# Patient Record
Sex: MALE | Race: BLACK OR AFRICAN AMERICAN | NOT HISPANIC OR LATINO | Employment: OTHER | URBAN - METROPOLITAN AREA
[De-identification: names, ages, dates, MRNs, and addresses within clinical notes are randomized per-mention and may not be internally consistent; named-entity substitution may affect disease eponyms.]

---

## 2017-05-03 DIAGNOSIS — Z12.5 SCREENING PSA (PROSTATE SPECIFIC ANTIGEN): Primary | ICD-10-CM

## 2017-05-03 DIAGNOSIS — E78.5 DYSLIPIDEMIA: ICD-10-CM

## 2017-05-04 RX ORDER — ATORVASTATIN CALCIUM 20 MG/1
TABLET, FILM COATED ORAL
Qty: 90 TABLET | Refills: 0 | Status: SHIPPED | OUTPATIENT
Start: 2017-05-04 | End: 2017-05-12 | Stop reason: SDUPTHER

## 2017-05-04 NOTE — TELEPHONE ENCOUNTER
Pt informed of refill sent to pharmacy and need for labs; verbalized understanding; scheduled for next week

## 2017-05-11 ENCOUNTER — LAB VISIT (OUTPATIENT)
Dept: LAB | Facility: HOSPITAL | Age: 62
End: 2017-05-11
Attending: FAMILY MEDICINE
Payer: COMMERCIAL

## 2017-05-11 DIAGNOSIS — Z12.5 SCREENING PSA (PROSTATE SPECIFIC ANTIGEN): ICD-10-CM

## 2017-05-11 DIAGNOSIS — E78.5 DYSLIPIDEMIA: ICD-10-CM

## 2017-05-11 LAB
ALBUMIN SERPL BCP-MCNC: 3.6 G/DL
ALP SERPL-CCNC: 64 U/L
ALT SERPL W/O P-5'-P-CCNC: 14 U/L
ANION GAP SERPL CALC-SCNC: 11 MMOL/L
AST SERPL-CCNC: 19 U/L
BILIRUB SERPL-MCNC: 0.6 MG/DL
BUN SERPL-MCNC: 18 MG/DL
CALCIUM SERPL-MCNC: 9.1 MG/DL
CHLORIDE SERPL-SCNC: 103 MMOL/L
CHOLEST/HDLC SERPL: 3.4 {RATIO}
CO2 SERPL-SCNC: 28 MMOL/L
COMPLEXED PSA SERPL-MCNC: 0.41 NG/ML
CREAT SERPL-MCNC: 1.2 MG/DL
EST. GFR  (AFRICAN AMERICAN): >60 ML/MIN/1.73 M^2
EST. GFR  (NON AFRICAN AMERICAN): >60 ML/MIN/1.73 M^2
GLUCOSE SERPL-MCNC: 95 MG/DL
HDL/CHOLESTEROL RATIO: 29.1 %
HDLC SERPL-MCNC: 148 MG/DL
HDLC SERPL-MCNC: 43 MG/DL
LDLC SERPL CALC-MCNC: 94.4 MG/DL
NONHDLC SERPL-MCNC: 105 MG/DL
POTASSIUM SERPL-SCNC: 3.8 MMOL/L
PROT SERPL-MCNC: 7.2 G/DL
SODIUM SERPL-SCNC: 142 MMOL/L
TRIGL SERPL-MCNC: 53 MG/DL

## 2017-05-11 PROCEDURE — 80061 LIPID PANEL: CPT

## 2017-05-11 PROCEDURE — 80053 COMPREHEN METABOLIC PANEL: CPT

## 2017-05-11 PROCEDURE — 36415 COLL VENOUS BLD VENIPUNCTURE: CPT | Mod: PO

## 2017-05-11 PROCEDURE — 84153 ASSAY OF PSA TOTAL: CPT

## 2017-05-12 ENCOUNTER — OFFICE VISIT (OUTPATIENT)
Dept: FAMILY MEDICINE | Facility: CLINIC | Age: 62
End: 2017-05-12
Payer: COMMERCIAL

## 2017-05-12 VITALS
OXYGEN SATURATION: 98 % | WEIGHT: 218.06 LBS | DIASTOLIC BLOOD PRESSURE: 80 MMHG | HEART RATE: 60 BPM | HEIGHT: 70 IN | TEMPERATURE: 98 F | SYSTOLIC BLOOD PRESSURE: 136 MMHG | RESPIRATION RATE: 17 BRPM | BODY MASS INDEX: 31.22 KG/M2

## 2017-05-12 DIAGNOSIS — E78.00 PURE HYPERCHOLESTEROLEMIA: ICD-10-CM

## 2017-05-12 DIAGNOSIS — I10 ESSENTIAL HYPERTENSION: ICD-10-CM

## 2017-05-12 DIAGNOSIS — Z00.00 WELL ADULT EXAM: Primary | ICD-10-CM

## 2017-05-12 PROCEDURE — 99396 PREV VISIT EST AGE 40-64: CPT | Mod: S$GLB,,, | Performed by: FAMILY MEDICINE

## 2017-05-12 PROCEDURE — 3075F SYST BP GE 130 - 139MM HG: CPT | Mod: S$GLB,,, | Performed by: FAMILY MEDICINE

## 2017-05-12 PROCEDURE — 99999 PR PBB SHADOW E&M-EST. PATIENT-LVL III: CPT | Mod: PBBFAC,,, | Performed by: FAMILY MEDICINE

## 2017-05-12 PROCEDURE — 3079F DIAST BP 80-89 MM HG: CPT | Mod: S$GLB,,, | Performed by: FAMILY MEDICINE

## 2017-05-12 RX ORDER — ATORVASTATIN CALCIUM 20 MG/1
20 TABLET, FILM COATED ORAL NIGHTLY
Qty: 90 TABLET | Refills: 0 | Status: SHIPPED | OUTPATIENT
Start: 2017-05-12 | End: 2017-08-28 | Stop reason: SDUPTHER

## 2017-05-12 NOTE — PROGRESS NOTES
Chief Complaint   Patient presents with    Annual Exam       Ascencion Owens is a 61 y.o. male who presents per the Chief Complaint.  Pt is known to me and was last seen by me on 5/10/2016.  All known chronic medical issues have been documented.       Hypertension   This is a chronic problem. The current episode started more than 1 year ago. The problem is unchanged. The problem is controlled. Associated symptoms include chest pain (chest wall muscle pull). Pertinent negatives include no anxiety, blurred vision, headaches, malaise/fatigue, neck pain, orthopnea, palpitations, peripheral edema, PND, shortness of breath or sweats. There are no associated agents to hypertension. Risk factors for coronary artery disease include male gender, obesity and dyslipidemia. Past treatments include ACE inhibitors and diuretics. The current treatment provides moderate improvement. There are no compliance problems.  There is no history of angina, kidney disease, CAD/MI, CVA, heart failure, left ventricular hypertrophy, PVD, renovascular disease, retinopathy or a thyroid problem. There is no history of chronic renal disease, coarctation of the aorta, hyperaldosteronism, hyperparathyroidism, a hypertension causing med or sleep apnea.   Hyperlipidemia   This is a chronic problem. The current episode started more than 1 year ago. The problem is controlled. Recent lipid tests were reviewed and are normal. Exacerbating diseases include obesity. He has no history of chronic renal disease, diabetes, hypothyroidism, liver disease or nephrotic syndrome. There are no known factors aggravating his hyperlipidemia. Associated symptoms include chest pain (chest wall muscle pull). Pertinent negatives include no focal sensory loss, focal weakness, leg pain, myalgias or shortness of breath. Current antihyperlipidemic treatment includes statins. The current treatment provides mild improvement of lipids. There are no compliance problems.  Risk  "factors for coronary artery disease include dyslipidemia, male sex, obesity, hypertension and family history.        ROS  Review of Systems   Constitutional: Negative for activity change, appetite change, chills, fatigue, fever and malaise/fatigue.   HENT: Negative for congestion, ear pain, postnasal drip, rhinorrhea, sinus pressure, sore throat and trouble swallowing.    Eyes: Negative for blurred vision, pain and visual disturbance.   Respiratory: Positive for cough (most commonly with singing). Negative for choking, chest tightness, shortness of breath, wheezing and stridor.    Cardiovascular: Positive for chest pain (chest wall muscle pull). Negative for palpitations, orthopnea and PND.   Gastrointestinal: Negative for abdominal pain, constipation, diarrhea, nausea and vomiting.   Genitourinary: Negative for difficulty urinating, dysuria, flank pain, frequency, penile pain, penile swelling, scrotal swelling, testicular pain and urgency.   Musculoskeletal: Negative.  Negative for arthralgias, back pain, joint swelling, myalgias, neck pain and neck stiffness.   Skin: Negative.    Allergic/Immunologic: Negative for environmental allergies, food allergies and immunocompromised state.   Neurological: Negative for dizziness, focal weakness, weakness, light-headedness and headaches.   Psychiatric/Behavioral: Negative.        Physical Exam  Vitals:    05/12/17 1027   BP: 136/80   Pulse: 60   Resp: 17   Temp: 98.1 °F (36.7 °C)    Body mass index is 31.28 kg/(m^2).  Weight: 98.9 kg (218 lb 0.6 oz)   Height: 5' 10" (177.8 cm)     Physical Exam   Constitutional: He is oriented to person, place, and time. He appears well-developed and well-nourished. He is active and cooperative.  Non-toxic appearance. He does not have a sickly appearance. He does not appear ill. No distress.   HENT:   Head: Normocephalic and atraumatic.   Right Ear: Hearing, tympanic membrane, external ear and ear canal normal. No tenderness. No foreign " bodies. No mastoid tenderness. Tympanic membrane is not injected, not scarred, not perforated, not erythematous, not retracted and not bulging. No hemotympanum. No decreased hearing is noted.   Left Ear: Hearing, tympanic membrane, external ear and ear canal normal. No tenderness. No foreign bodies. No mastoid tenderness. Tympanic membrane is not injected, not scarred, not perforated, not erythematous, not retracted and not bulging. No hemotympanum. No decreased hearing is noted.   Nose: Nose normal. No sinus tenderness, nasal deformity or septal deviation. No epistaxis.  No foreign bodies.   Mouth/Throat: Uvula is midline, oropharynx is clear and moist and mucous membranes are normal. He does not have dentures. Normal dentition. No uvula swelling or dental caries. No oropharyngeal exudate, posterior oropharyngeal edema, posterior oropharyngeal erythema or tonsillar abscesses.   Eyes: Conjunctivae, EOM and lids are normal. Pupils are equal, round, and reactive to light. Right eye exhibits no chemosis, no discharge, no exudate and no hordeolum. No foreign body present in the right eye. Left eye exhibits no chemosis, no discharge, no exudate and no hordeolum. No foreign body present in the left eye. No scleral icterus.   Neck: Normal range of motion and full passive range of motion without pain. Neck supple. No thyroid mass and no thyromegaly present.   Cardiovascular: Normal rate, regular rhythm, S1 normal, S2 normal and normal heart sounds.  Exam reveals no gallop and no friction rub.    No murmur heard.  Pulmonary/Chest: Effort normal and breath sounds normal. He has no decreased breath sounds. He has no wheezes. He has no rhonchi. He has no rales.   Abdominal: Soft. Normal appearance and bowel sounds are normal. He exhibits no distension, no ascites and no mass. There is no hepatosplenomegaly. There is no tenderness. There is no rigidity, no rebound and no guarding. No hernia.   Musculoskeletal: Normal range of  motion.   Lymphadenopathy:        Head (right side): No submental, no submandibular, no preauricular and no posterior auricular adenopathy present.        Head (left side): No submental, no submandibular, no preauricular and no posterior auricular adenopathy present.     He has no cervical adenopathy.   Neurological: He is alert and oriented to person, place, and time. He has normal strength. He displays no atrophy and no tremor. No cranial nerve deficit or sensory deficit. He exhibits normal muscle tone. He displays no seizure activity.   Skin: Skin is warm, dry and intact. No rash noted. He is not diaphoretic. No pallor.   Psychiatric: He has a normal mood and affect. His speech is normal and behavior is normal. Judgment and thought content normal. Cognition and memory are normal. He is attentive.   Vitals reviewed.      Assessment & Plan    1. Well adult exam  Discussed age appropriate screenings at this visit and encouraged a healthy diet with low saturated fats, and increased physical activity.  Screening test reviewed and discussed with patient.         2. Essential hypertension  Patient was counseled and encouraged to maintain a low sodium diet, as well as increasing physical activity.  Recommend random BP checks at home on a regular basis.  Repeat BP at end of visit was not necessary. Will continue medication at this time, and follow up in 6 months, or sooner if blood pressure is not well controlled.       3. Pure hypercholesterolemia  We discussed ways to manage cholesterol levels, including increasing whole grain foods and decreasing fried and fatty foods.  I also recommended OTC Omega 3 and Omega 6 supplements to improve overall cholesterol levels.  Will continue current therapy at this visit; patient is not due for screening blood test at this time.   - atorvastatin (LIPITOR) 20 MG tablet; Take 1 tablet (20 mg total) by mouth nightly.  Dispense: 90 tablet; Refill: 0    Follow up documented    ACTIVE  MEDICAL ISSUES:  Documented in Problem List    PAST MEDICAL HISTORY  Documented    PAST SURGICAL HISTORY:  Documented    SOCIAL HISTORY:  Documented    FAMILY HISTORY:  Documented    ALLERGIES AND MEDICATIONS: updated and reviewed.  Documented    Health Maintenance       Date Due Completion Date    Influenza Vaccine 8/1/2017 12/4/2015    Override on 12/4/2015: Done (today)    Override on 10/7/2014: (N/S)    Override on 1/1/2014: (N/S) (per pt)    Lipid Panel 5/11/2022 5/11/2017    Colonoscopy 6/10/2025 6/10/2015    TETANUS VACCINE 5/10/2026 5/10/2016 (Done)    Override on 5/10/2016: Done (future)

## 2017-06-30 ENCOUNTER — OFFICE VISIT (OUTPATIENT)
Dept: FAMILY MEDICINE | Facility: CLINIC | Age: 62
End: 2017-06-30
Payer: COMMERCIAL

## 2017-06-30 ENCOUNTER — TELEPHONE (OUTPATIENT)
Dept: FAMILY MEDICINE | Facility: CLINIC | Age: 62
End: 2017-06-30

## 2017-06-30 VITALS
WEIGHT: 218.94 LBS | SYSTOLIC BLOOD PRESSURE: 138 MMHG | HEIGHT: 70 IN | HEART RATE: 62 BPM | RESPIRATION RATE: 16 BRPM | OXYGEN SATURATION: 96 % | BODY MASS INDEX: 31.34 KG/M2 | TEMPERATURE: 98 F | DIASTOLIC BLOOD PRESSURE: 84 MMHG

## 2017-06-30 DIAGNOSIS — I10 ESSENTIAL HYPERTENSION: Primary | ICD-10-CM

## 2017-06-30 DIAGNOSIS — M10.071 ACUTE IDIOPATHIC GOUT INVOLVING TOE OF RIGHT FOOT: ICD-10-CM

## 2017-06-30 DIAGNOSIS — B35.3 TINEA PEDIS OF LEFT FOOT: ICD-10-CM

## 2017-06-30 PROCEDURE — 99999 PR PBB SHADOW E&M-EST. PATIENT-LVL III: CPT | Mod: PBBFAC,,, | Performed by: FAMILY MEDICINE

## 2017-06-30 PROCEDURE — 99214 OFFICE O/P EST MOD 30 MIN: CPT | Mod: S$GLB,,, | Performed by: FAMILY MEDICINE

## 2017-06-30 RX ORDER — FLUCONAZOLE 150 MG/1
150 TABLET ORAL WEEKLY
Qty: 4 TABLET | Refills: 0 | Status: SHIPPED | OUTPATIENT
Start: 2017-06-30 | End: 2017-07-22

## 2017-06-30 RX ORDER — LISINOPRIL AND HYDROCHLOROTHIAZIDE 12.5; 2 MG/1; MG/1
1 TABLET ORAL DAILY
Qty: 90 TABLET | Refills: 1 | Status: SHIPPED | OUTPATIENT
Start: 2017-06-30 | End: 2018-01-08 | Stop reason: SDUPTHER

## 2017-06-30 NOTE — TELEPHONE ENCOUNTER
----- Message from Zuleyma Richmond sent at 6/30/2017 11:59 AM CDT -----  Contact: SELF  Calling regarding prior auth for a medication that was called in today . He does not know the name of it . He would like something else called in .       [phone]     LL

## 2017-06-30 NOTE — TELEPHONE ENCOUNTER
Left message for patient to call office. Called to get information from patient regarding medication that he would like to have changed.  Patient had 2 medications sent to pharmacy today. Which medication is her trying to change?  Please advise.

## 2017-06-30 NOTE — PROGRESS NOTES
Chief Complaint   Patient presents with    Recurrent Skin Infections       Ascencion Owens is a 61 y.o. male who presents per the Chief Complaint.  Pt is known to me and was last seen by me on 5/12/2017.  All known chronic medical issues have been documented.       Rash   This is a new problem. The current episode started 1 to 4 weeks ago. The problem has been gradually improving since onset. The affected locations include the left toes. The rash is characterized by draining and peeling. He was exposed to nothing. Pertinent negatives include no anorexia, congestion, cough, diarrhea, eye pain, facial edema, fatigue, fever, joint pain, nail changes, rhinorrhea, shortness of breath, sore throat or vomiting. Past treatments include anti-itch cream (antifungal powder). The treatment provided mild relief. There is no history of allergies, asthma, eczema or varicella.   Hypertension   This is a chronic problem. The current episode started more than 1 year ago. The problem is unchanged. The problem is controlled. Pertinent negatives include no anxiety, blurred vision, chest pain, headaches, malaise/fatigue, neck pain, orthopnea, palpitations, peripheral edema, PND, shortness of breath or sweats. There are no associated agents to hypertension. Risk factors for coronary artery disease include male gender, obesity and dyslipidemia. Past treatments include ACE inhibitors and diuretics. The current treatment provides moderate improvement. There are no compliance problems.  There is no history of angina, kidney disease, CAD/MI, CVA, heart failure, left ventricular hypertrophy, PVD, renovascular disease, retinopathy or a thyroid problem. There is no history of chronic renal disease, coarctation of the aorta, hyperaldosteronism, hyperparathyroidism, a hypertension causing med or sleep apnea.        ROS  Review of Systems   Constitutional: Negative.  Negative for activity change, appetite change, chills, diaphoresis, fatigue,  "fever, malaise/fatigue and unexpected weight change.   HENT: Negative.  Negative for congestion, ear pain, hearing loss, nosebleeds, postnasal drip, rhinorrhea, sinus pressure, sneezing, sore throat and trouble swallowing.    Eyes: Negative for blurred vision, pain and visual disturbance.   Respiratory: Negative for cough, choking and shortness of breath.    Cardiovascular: Negative for chest pain, palpitations, orthopnea, leg swelling and PND.   Gastrointestinal: Negative for abdominal pain, anorexia, constipation, diarrhea, nausea and vomiting.   Genitourinary: Negative for difficulty urinating, dysuria, frequency and urgency.   Musculoskeletal: Positive for arthralgias (right big toe). Negative for back pain, gait problem, joint pain, joint swelling, myalgias, neck pain and neck stiffness.   Skin: Positive for rash (between 4th and 5th toes on left foot). Negative for nail changes.   Allergic/Immunologic: Negative for environmental allergies and food allergies.   Neurological: Negative.  Negative for dizziness, focal weakness, seizures, syncope, weakness, light-headedness and headaches.   Psychiatric/Behavioral: Negative.  Negative for confusion, decreased concentration, dysphoric mood and sleep disturbance. The patient is not nervous/anxious.        Physical Exam  Vitals:    06/30/17 0937   BP: 138/84   Pulse: 62   Resp: 16   Temp: 98.2 °F (36.8 °C)    Body mass index is 31.41 kg/m².  Weight: 99.3 kg (218 lb 14.7 oz)   Height: 5' 10" (177.8 cm)     Physical Exam   Constitutional: He is oriented to person, place, and time. He appears well-developed and well-nourished. He is active and cooperative.  Non-toxic appearance. He does not have a sickly appearance. He does not appear ill. No distress.   HENT:   Head: Normocephalic and atraumatic.   Right Ear: Hearing and external ear normal. No decreased hearing is noted.   Left Ear: Hearing and external ear normal. No decreased hearing is noted.   Nose: Nose normal. No " rhinorrhea or nasal deformity.   Mouth/Throat: Uvula is midline and oropharynx is clear and moist. He does not have dentures. Normal dentition.   Eyes: Conjunctivae, EOM and lids are normal. Pupils are equal, round, and reactive to light. Right eye exhibits no chemosis, no discharge and no exudate. No foreign body present in the right eye. Left eye exhibits no chemosis, no discharge and no exudate. No foreign body present in the left eye. No scleral icterus.   Neck: Normal range of motion and full passive range of motion without pain. Neck supple.   Cardiovascular: Normal rate, regular rhythm, S1 normal, S2 normal and normal heart sounds.  Exam reveals no gallop and no friction rub.    No murmur heard.  Pulmonary/Chest: Effort normal and breath sounds normal. No accessory muscle usage. No respiratory distress. He has no decreased breath sounds. He has no wheezes. He has no rhonchi. He has no rales.   Abdominal: Soft. Normal appearance. He exhibits no distension. There is no hepatosplenomegaly. There is no tenderness. There is no rigidity, no rebound and no guarding.   Musculoskeletal: Normal range of motion.        Right foot: There is tenderness.        Feet:    Neurological: He is alert and oriented to person, place, and time. He has normal strength. No cranial nerve deficit or sensory deficit. He exhibits normal muscle tone. He displays no seizure activity. Coordination and gait normal.   Skin: Skin is warm, dry and intact. Lesion and rash noted. No purpura noted. Rash is not macular, not papular, not maculopapular, not nodular, not pustular, not vesicular and not urticarial. He is not diaphoretic. No cyanosis. Nails show no clubbing.        Psychiatric: He has a normal mood and affect. His speech is normal and behavior is normal. Judgment and thought content normal. Cognition and memory are normal. He is attentive.       Assessment & Plan    1. Essential hypertension  Patient was counseled and encouraged to  maintain a low sodium diet, as well as increasing physical activity.  Recommend random BP checks at home on a regular basis.  Repeat BP at end of visit was not necessary. Will continue medication at this time, and follow up in 3 months, or sooner if blood pressure is not well controlled.     - lisinopril-hydrochlorothiazide (PRINZIDE,ZESTORETIC) 20-12.5 mg per tablet; Take 1 tablet by mouth once daily.  Dispense: 90 tablet; Refill: 1    2. Tinea pedis of left foot  Will start weekly oral anti-fungal until resolved; recommended using topical tolnaftate to improve healing.  - fluconazole (DIFLUCAN) 150 MG Tab; Take 1 tablet (150 mg total) by mouth once a week.  Dispense: 4 tablet; Refill: 0    3. Acute idiopathic gout involving toe of right foot  First episode; discussed diet as possible trigger.  Recommended increased hydration and diet modification; no medication at this time.      Follow up documented    ACTIVE MEDICAL ISSUES:  Documented in Problem List    PAST MEDICAL HISTORY  Documented    PAST SURGICAL HISTORY:  Documented    SOCIAL HISTORY:  Documented    FAMILY HISTORY:  Documented    ALLERGIES AND MEDICATIONS: updated and reviewed.  Documented    Health Maintenance       Date Due Completion Date    Influenza Vaccine 08/01/2017 12/4/2015    Override on 12/4/2015: Done (today)    Override on 10/7/2014: (N/S)    Override on 1/1/2014: (N/S) (per pt)    Lipid Panel 05/11/2022 5/11/2017    Colonoscopy 06/10/2025 6/10/2015    TETANUS VACCINE 05/10/2026 5/10/2016 (Done)    Override on 5/10/2016: Done (future)

## 2017-07-03 ENCOUNTER — TELEPHONE (OUTPATIENT)
Dept: FAMILY MEDICINE | Facility: CLINIC | Age: 62
End: 2017-07-03

## 2017-07-03 ENCOUNTER — NURSE TRIAGE (OUTPATIENT)
Dept: ADMINISTRATIVE | Facility: CLINIC | Age: 62
End: 2017-07-03

## 2017-07-03 NOTE — TELEPHONE ENCOUNTER
"    Reason for Disposition   Caller has NON-URGENT medication question about med that PCP prescribed and triager unable to answer question    Answer Assessment - Initial Assessment Questions  1. SYMPTOMS: "Do you have any symptoms?"      -  2. SEVERITY: If symptoms are present, ask "Are they mild, moderate or severe?"      -  Mr. Owens states his insurance company recommends Nystatin to be ordered to replace fluconazole.    Protocols used: ST MEDICATION QUESTION CALL-A-      "

## 2017-07-03 NOTE — TELEPHONE ENCOUNTER
Gwendolyn Calais, RN [facility] Staff 50 minutes ago (3:21 PM)      Good afternnon,     Please give Mr. Owens a call back at [phone]. He states he contact Estelle with his insurance company regarding fluconazole. Mr. Owens states his insurance company recommends Nystatin to be ordered to replace fluconazole. Estelle can be reached at [phone] if there any questions.     Thank you.

## 2017-07-03 NOTE — TELEPHONE ENCOUNTER
----- Message from Maggie Ortega sent at 7/3/2017 12:49 PM CDT -----  Contact: self  Pt is requesting script for athletes foot. Please advise. [phone]

## 2017-07-06 RX ORDER — NYSTATIN 100000 U/G
CREAM TOPICAL 2 TIMES DAILY
Qty: 30 G | Refills: 0 | Status: SHIPPED | OUTPATIENT
Start: 2017-07-06 | End: 2020-01-10

## 2017-08-01 DIAGNOSIS — E78.5 DYSLIPIDEMIA: ICD-10-CM

## 2017-08-02 RX ORDER — ATORVASTATIN CALCIUM 20 MG/1
TABLET, FILM COATED ORAL
Qty: 90 TABLET | Refills: 0 | OUTPATIENT
Start: 2017-08-02

## 2017-08-16 ENCOUNTER — TELEPHONE (OUTPATIENT)
Dept: FAMILY MEDICINE | Facility: CLINIC | Age: 62
End: 2017-08-16

## 2017-08-16 NOTE — TELEPHONE ENCOUNTER
----- Message from Augustina Johnson sent at 8/15/2017  4:51 PM CDT -----  Contact: Self  Pt calling regarding med below. Please call [phone].      atorvastatin (LIPITOR) 20 MG tablet

## 2017-08-22 DIAGNOSIS — E78.00 PURE HYPERCHOLESTEROLEMIA: ICD-10-CM

## 2017-08-22 NOTE — TELEPHONE ENCOUNTER
Patient informed that script refill status will be verified when Dr. Lombard returns to office on 8/23/2017.  Patient verbalized understanding, stated he has some medication left.

## 2017-08-22 NOTE — TELEPHONE ENCOUNTER
----- Message from Lexis Serrato sent at 8/17/2017  9:55 AM CDT -----  Patient is returning Liana's call.  States on August 2nd he received an e-mail stating his prescription for atorvastatin (LIPITOR) 20 MG tablet (prescription ending #[id number]) was unable to be processed because they were waiting on the doctor. Please call at [phone] Thank you!

## 2017-08-28 RX ORDER — ATORVASTATIN CALCIUM 20 MG/1
20 TABLET, FILM COATED ORAL NIGHTLY
Qty: 90 TABLET | Refills: 2 | Status: SHIPPED | OUTPATIENT
Start: 2017-08-28 | End: 2017-08-28 | Stop reason: SDUPTHER

## 2017-08-28 RX ORDER — ATORVASTATIN CALCIUM 20 MG/1
20 TABLET, FILM COATED ORAL NIGHTLY
Qty: 90 TABLET | Refills: 2 | Status: SHIPPED | OUTPATIENT
Start: 2017-08-28 | End: 2018-05-23 | Stop reason: SDUPTHER

## 2017-11-13 ENCOUNTER — TELEPHONE (OUTPATIENT)
Dept: FAMILY MEDICINE | Facility: CLINIC | Age: 62
End: 2017-11-13

## 2017-11-13 DIAGNOSIS — E78.5 DYSLIPIDEMIA: ICD-10-CM

## 2017-11-13 DIAGNOSIS — E78.00 PURE HYPERCHOLESTEROLEMIA: Primary | ICD-10-CM

## 2017-11-13 DIAGNOSIS — Z12.5 SCREENING PSA (PROSTATE SPECIFIC ANTIGEN): ICD-10-CM

## 2017-11-13 NOTE — TELEPHONE ENCOUNTER
----- Message from Abbe Bowen sent at 11/11/2017  9:51 AM CST -----  Contact: joanna  Pt called requesting orders for labs before 11.16.17 visit. Contact him at [phone]    Thanks-

## 2017-11-13 NOTE — TELEPHONE ENCOUNTER
Patient would like to come in before appointment for labs  LOV 5/12/2017  PSA,lipid and CMP done on 5/11/2017  Next appointment on 11/16/2017 at 2:20 pm

## 2017-11-15 ENCOUNTER — LAB VISIT (OUTPATIENT)
Dept: LAB | Facility: HOSPITAL | Age: 62
End: 2017-11-15
Attending: FAMILY MEDICINE
Payer: COMMERCIAL

## 2017-11-15 DIAGNOSIS — E78.00 PURE HYPERCHOLESTEROLEMIA: ICD-10-CM

## 2017-11-15 DIAGNOSIS — Z12.5 SCREENING PSA (PROSTATE SPECIFIC ANTIGEN): ICD-10-CM

## 2017-11-15 DIAGNOSIS — E78.5 DYSLIPIDEMIA: ICD-10-CM

## 2017-11-15 LAB
ALBUMIN SERPL BCP-MCNC: 3.4 G/DL
ALP SERPL-CCNC: 71 U/L
ALT SERPL W/O P-5'-P-CCNC: 17 U/L
ANION GAP SERPL CALC-SCNC: 7 MMOL/L
AST SERPL-CCNC: 20 U/L
BILIRUB SERPL-MCNC: 0.8 MG/DL
BUN SERPL-MCNC: 14 MG/DL
CALCIUM SERPL-MCNC: 9.5 MG/DL
CHLORIDE SERPL-SCNC: 105 MMOL/L
CHOLEST SERPL-MCNC: 151 MG/DL
CHOLEST/HDLC SERPL: 3.9 {RATIO}
CO2 SERPL-SCNC: 32 MMOL/L
COMPLEXED PSA SERPL-MCNC: 0.39 NG/ML
CREAT SERPL-MCNC: 1.2 MG/DL
EST. GFR  (AFRICAN AMERICAN): >60 ML/MIN/1.73 M^2
EST. GFR  (NON AFRICAN AMERICAN): >60 ML/MIN/1.73 M^2
GLUCOSE SERPL-MCNC: 90 MG/DL
HDLC SERPL-MCNC: 39 MG/DL
HDLC SERPL: 25.8 %
LDLC SERPL CALC-MCNC: 100.8 MG/DL
NONHDLC SERPL-MCNC: 112 MG/DL
POTASSIUM SERPL-SCNC: 4 MMOL/L
PROT SERPL-MCNC: 6.9 G/DL
SODIUM SERPL-SCNC: 144 MMOL/L
TRIGL SERPL-MCNC: 56 MG/DL

## 2017-11-15 PROCEDURE — 80053 COMPREHEN METABOLIC PANEL: CPT

## 2017-11-15 PROCEDURE — 80061 LIPID PANEL: CPT

## 2017-11-15 PROCEDURE — 84153 ASSAY OF PSA TOTAL: CPT

## 2017-11-15 PROCEDURE — 36415 COLL VENOUS BLD VENIPUNCTURE: CPT | Mod: PO

## 2017-11-16 ENCOUNTER — OFFICE VISIT (OUTPATIENT)
Dept: FAMILY MEDICINE | Facility: CLINIC | Age: 62
End: 2017-11-16
Payer: COMMERCIAL

## 2017-11-16 VITALS
OXYGEN SATURATION: 98 % | SYSTOLIC BLOOD PRESSURE: 136 MMHG | HEART RATE: 62 BPM | RESPIRATION RATE: 17 BRPM | BODY MASS INDEX: 31.47 KG/M2 | HEIGHT: 70 IN | TEMPERATURE: 99 F | DIASTOLIC BLOOD PRESSURE: 78 MMHG | WEIGHT: 219.81 LBS

## 2017-11-16 DIAGNOSIS — E78.00 PURE HYPERCHOLESTEROLEMIA: ICD-10-CM

## 2017-11-16 DIAGNOSIS — I10 ESSENTIAL HYPERTENSION: Primary | ICD-10-CM

## 2017-11-16 DIAGNOSIS — Z86.69 HISTORY OF CLUSTER HEADACHE: ICD-10-CM

## 2017-11-16 PROCEDURE — 99999 PR PBB SHADOW E&M-EST. PATIENT-LVL III: CPT | Mod: PBBFAC,,, | Performed by: FAMILY MEDICINE

## 2017-11-16 PROCEDURE — 99214 OFFICE O/P EST MOD 30 MIN: CPT | Mod: S$GLB,,, | Performed by: FAMILY MEDICINE

## 2017-11-16 RX ORDER — AMLODIPINE BESYLATE 10 MG/1
10 TABLET ORAL DAILY
Qty: 30 TABLET | Refills: 1 | Status: SHIPPED | OUTPATIENT
Start: 2017-11-16 | End: 2017-11-29 | Stop reason: SDUPTHER

## 2017-11-16 NOTE — PROGRESS NOTES
Chief Complaint   Patient presents with    Hypertension     6 months follow up , patient did had flu shot went to [facility]     Insomnia    Urinary Frequency     at nights time       Ascencion Owens is a 62 y.o. male who presents per the Chief Complaint.  Pt is known to me and was last seen by me on 6/30/2017.  All known chronic medical issues have been documented.       Hypertension   This is a chronic problem. The current episode started more than 1 year ago. The problem is unchanged. The problem is controlled. Associated symptoms include headaches (history of cluster headache). Pertinent negatives include no anxiety, blurred vision, chest pain, malaise/fatigue, neck pain, orthopnea, palpitations, peripheral edema, PND, shortness of breath or sweats. There are no associated agents to hypertension. Risk factors for coronary artery disease include male gender, obesity and dyslipidemia. Past treatments include ACE inhibitors and diuretics. The current treatment provides moderate improvement. There are no compliance problems.  There is no history of angina, kidney disease, CAD/MI, CVA, heart failure, left ventricular hypertrophy, PVD, renovascular disease, retinopathy or a thyroid problem. There is no history of chronic renal disease, coarctation of the aorta, hyperaldosteronism, hyperparathyroidism, a hypertension causing med or sleep apnea.   Urinary Frequency    This is a recurrent problem. The current episode started 1 to 4 weeks ago. The problem occurs intermittently. The problem has been waxing and waning. The pain is at a severity of 0/10. The patient is experiencing no pain. There has been no fever. Associated symptoms include frequency. Pertinent negatives include no behavior changes, chills, discharge, flank pain, hematuria, hesitancy, nausea, possible pregnancy, sweats, urgency, vomiting, weight loss, bubble bath use, constipation, rash or withholding. He has tried nothing for the symptoms. His past  medical history is significant for hypertension. There is no history of catheterization, diabetes insipidus, diabetes mellitus, genitourinary reflux, kidney stones, recurrent UTIs, a single kidney, STD, urinary stasis or a urological procedure.   Hyperlipidemia   This is a chronic problem. The current episode started more than 1 year ago. The problem is controlled. Recent lipid tests were reviewed and are low. He has no history of chronic renal disease, diabetes, hypothyroidism, liver disease, obesity or nephrotic syndrome. There are no known factors aggravating his hyperlipidemia. Pertinent negatives include no chest pain, myalgias or shortness of breath. Current antihyperlipidemic treatment includes statins. The current treatment provides significant improvement of lipids. There are no compliance problems.  Risk factors for coronary artery disease include dyslipidemia, hypertension and male sex.        ROS  Review of Systems   Constitutional: Negative.  Negative for activity change, appetite change, chills, diaphoresis, fatigue, fever, malaise/fatigue, unexpected weight change and weight loss.   HENT: Positive for congestion (head congestion). Negative for ear pain, hearing loss, nosebleeds, postnasal drip, rhinorrhea, sinus pain, sinus pressure, sneezing, sore throat, tinnitus, trouble swallowing and voice change.    Eyes: Negative for blurred vision, pain and visual disturbance.   Respiratory: Negative for cough, choking and shortness of breath.    Cardiovascular: Negative for chest pain, palpitations, orthopnea, leg swelling and PND.   Gastrointestinal: Negative for abdominal pain, anorexia, constipation, diarrhea, nausea and vomiting.   Genitourinary: Positive for frequency. Negative for decreased urine volume, difficulty urinating, discharge, dysuria, enuresis, flank pain, genital sores, hematuria, hesitancy, penile pain, penile swelling, scrotal swelling, testicular pain and urgency.   Musculoskeletal:  "Negative.  Negative for arthralgias, back pain, gait problem, joint pain, joint swelling, myalgias and neck pain.   Skin: Negative for nail changes and rash.   Allergic/Immunologic: Negative for environmental allergies and food allergies.   Neurological: Positive for headaches (history of cluster headache). Negative for dizziness, seizures, syncope, weakness and light-headedness.   Psychiatric/Behavioral: Positive for sleep disturbance. Negative for confusion, decreased concentration and dysphoric mood. The patient is not nervous/anxious.        Physical Exam  Vitals:    11/16/17 1448   BP: 136/78   Pulse:    Resp:    Temp:     Body mass index is 31.54 kg/m².  Weight: 99.7 kg (219 lb 12.8 oz)   Height: 5' 10" (177.8 cm)     Physical Exam   Constitutional: He is oriented to person, place, and time. He appears well-developed and well-nourished. He is active and cooperative.  Non-toxic appearance. He does not have a sickly appearance. He does not appear ill. No distress.   HENT:   Head: Normocephalic and atraumatic.   Right Ear: Hearing and external ear normal. No decreased hearing is noted.   Left Ear: Hearing and external ear normal. No decreased hearing is noted.   Nose: Nose normal. No rhinorrhea or nasal deformity.   Mouth/Throat: Uvula is midline and oropharynx is clear and moist. He does not have dentures. Normal dentition.   Eyes: Conjunctivae, EOM and lids are normal. Pupils are equal, round, and reactive to light. Right eye exhibits no chemosis, no discharge and no exudate. No foreign body present in the right eye. Left eye exhibits no chemosis, no discharge and no exudate. No foreign body present in the left eye. No scleral icterus.   Neck: Normal range of motion and full passive range of motion without pain. Neck supple.   Cardiovascular: Normal rate, regular rhythm, S1 normal, S2 normal and normal heart sounds.  Exam reveals no gallop and no friction rub.    No murmur heard.  Pulmonary/Chest: Effort normal " and breath sounds normal. No accessory muscle usage. No respiratory distress. He has no decreased breath sounds. He has no wheezes. He has no rhonchi. He has no rales.   Abdominal: Soft. Normal appearance. He exhibits no distension. There is no hepatosplenomegaly. There is no tenderness. There is no rigidity, no rebound and no guarding.   Musculoskeletal: Normal range of motion.   Neurological: He is alert and oriented to person, place, and time. He has normal strength. No cranial nerve deficit or sensory deficit. He exhibits normal muscle tone. He displays no seizure activity. Coordination and gait normal.   Skin: Skin is warm, dry and intact. No rash noted. He is not diaphoretic.   Psychiatric: He has a normal mood and affect. His speech is normal and behavior is normal. Judgment and thought content normal. Cognition and memory are normal. He is attentive.       Assessment & Plan    1. Essential hypertension  Patient was counseled and encouraged to maintain a low sodium diet, as well as increasing physical activity.  Recommend random BP checks at home on a regular basis.  Repeat BP at end of visit was not necessary. Will continue medication at this time, and follow up in 3-6 months, or sooner if blood pressure begins to increase.  Will stop ACEI due to onset of cough and stop diuretic due to increase urination.  Will start CCB.  - amLODIPine (NORVASC) 10 MG tablet; Take 1 tablet (10 mg total) by mouth once daily.  Dispense: 30 tablet; Refill: 1    2. Pure hypercholesterolemia  We discussed ways to manage cholesterol levels, including increasing whole grain foods and decreasing fried and fatty foods.  I also recommended OTC Omega 3 and Omega 6 supplements to improve overall cholesterol levels.  Will continue current therapy at this visit; patient is not due for screening blood test at this time.     3. History of cluster headache  Stable; no acute episodes.  Recent symptoms likely sinus and allergy  related.      Follow up documented    ACTIVE MEDICAL ISSUES:  Documented in Problem List    PAST MEDICAL HISTORY  Documented    PAST SURGICAL HISTORY:  Documented    SOCIAL HISTORY:  Documented    FAMILY HISTORY:  Documented    ALLERGIES AND MEDICATIONS: updated and reviewed.  Documented    Health Maintenance       Date Due Completion Date    Influenza Vaccine 08/01/2017 10/4/2016    Override on 12/4/2015: Done (today)    Override on 10/7/2014: (N/S)    Override on 1/1/2014: (N/S) (per pt)    Lipid Panel 11/15/2022 11/15/2017    Colonoscopy 06/10/2025 6/10/2015    TETANUS VACCINE 05/10/2026 5/10/2016 (Done)    Override on 5/10/2016: Done (future)

## 2017-11-25 ENCOUNTER — OFFICE VISIT (OUTPATIENT)
Dept: FAMILY MEDICINE | Facility: CLINIC | Age: 62
End: 2017-11-25
Payer: COMMERCIAL

## 2017-11-25 VITALS
HEIGHT: 69 IN | OXYGEN SATURATION: 98 % | BODY MASS INDEX: 32.98 KG/M2 | DIASTOLIC BLOOD PRESSURE: 66 MMHG | TEMPERATURE: 98 F | HEART RATE: 72 BPM | WEIGHT: 222.69 LBS | SYSTOLIC BLOOD PRESSURE: 140 MMHG

## 2017-11-25 DIAGNOSIS — M10.9 ACUTE GOUT INVOLVING TOE OF LEFT FOOT, UNSPECIFIED CAUSE: Primary | ICD-10-CM

## 2017-11-25 PROCEDURE — 99999 PR PBB SHADOW E&M-EST. PATIENT-LVL III: CPT | Mod: PBBFAC,,, | Performed by: NURSE PRACTITIONER

## 2017-11-25 PROCEDURE — 96372 THER/PROPH/DIAG INJ SC/IM: CPT | Mod: S$GLB,,, | Performed by: INTERNAL MEDICINE

## 2017-11-25 PROCEDURE — 99213 OFFICE O/P EST LOW 20 MIN: CPT | Mod: 25,S$GLB,, | Performed by: NURSE PRACTITIONER

## 2017-11-25 RX ORDER — IBUPROFEN 800 MG/1
800 TABLET ORAL 3 TIMES DAILY
Qty: 30 TABLET | Refills: 0 | Status: SHIPPED | OUTPATIENT
Start: 2017-11-25 | End: 2017-11-25 | Stop reason: SDUPTHER

## 2017-11-25 RX ORDER — IBUPROFEN 800 MG/1
800 TABLET ORAL 3 TIMES DAILY
Qty: 30 TABLET | Refills: 0 | Status: SHIPPED | OUTPATIENT
Start: 2017-11-25 | End: 2017-11-29 | Stop reason: ALTCHOICE

## 2017-11-25 RX ORDER — PREDNISONE 20 MG/1
20 TABLET ORAL DAILY
Qty: 3 TABLET | Refills: 0 | Status: SHIPPED | OUTPATIENT
Start: 2017-11-25 | End: 2017-11-25 | Stop reason: SDUPTHER

## 2017-11-25 RX ORDER — PREDNISONE 20 MG/1
20 TABLET ORAL DAILY
Qty: 3 TABLET | Refills: 0 | Status: SHIPPED | OUTPATIENT
Start: 2017-11-25 | End: 2017-11-28

## 2017-11-25 NOTE — PROGRESS NOTES
Subjective:       Patient ID: Ascencion Owens is a 62 y.o. male who presents to urgent care with left great toe pain that began several days ago, the pain has gotten worse over the last few days, very sensitive to touch, has been told in the past he has gout, has been taking otc meds with some help    Chief Complaint: Gout    HPI  Review of Systems   Constitutional: Negative for activity change, appetite change, chills, fatigue and fever.   Respiratory: Negative for cough, chest tightness, shortness of breath and wheezing.    Cardiovascular: Negative for chest pain and leg swelling.   Gastrointestinal: Negative for abdominal distention, abdominal pain, anal bleeding, blood in stool, constipation, diarrhea, nausea, rectal pain and vomiting.   Genitourinary: Negative for difficulty urinating, enuresis, flank pain, frequency, genital sores and hematuria.   Musculoskeletal: Positive for gait problem and joint swelling. Negative for back pain.   Skin: Negative.  Negative for color change.   Neurological: Negative for dizziness, syncope and numbness.   Psychiatric/Behavioral: Negative for agitation, confusion, decreased concentration, dysphoric mood and hallucinations. The patient is not nervous/anxious and is not hyperactive.        Objective:      Physical Exam   Constitutional: He is oriented to person, place, and time. He appears well-developed and well-nourished.   Cardiovascular: Normal rate, regular rhythm and normal heart sounds.    Pulmonary/Chest: Effort normal and breath sounds normal. No respiratory distress. He has no wheezes. He has no rales.   Abdominal: Soft. He exhibits no distension and no mass. There is no tenderness. There is no guarding.   Musculoskeletal: Normal range of motion.   Left great toe red and swollen, very tender to touch   Neurological: He is alert and oriented to person, place, and time.   Skin: Skin is warm and dry.   Psychiatric: He has a normal mood and affect. His behavior is  normal. Judgment and thought content normal.       Assessment:       1. Acute gout involving toe of left foot, unspecified cause        Plan:       Ascencion was seen today for gout.    Diagnoses and all orders for this visit:    Acute gout involving toe of left foot, unspecified cause    Other orders  -     methylPREDNISolone sod suc(PF) injection 125 mg; Inject 125 mg into the muscle one time.  -     Discontinue: predniSONE (DELTASONE) 20 MG tablet; Take 1 tablet (20 mg total) by mouth once daily.  -     Discontinue: ibuprofen (ADVIL,MOTRIN) 800 MG tablet; Take 1 tablet (800 mg total) by mouth 3 (three) times daily.  -     predniSONE (DELTASONE) 20 MG tablet; Take 1 tablet (20 mg total) by mouth once daily.  -     ibuprofen (ADVIL,MOTRIN) 800 MG tablet; Take 1 tablet (800 mg total) by mouth 3 (three) times daily.        Education  Pt has been given instructions populated from [address] database and those entered into patient instructions field and has verbalized understanding of the after visit summary and information contained wherein.    Follow Up  If no better 2-3 days fu wit pcp.    In Case of Emergency   May go to ER for acute shortness of breath, lightheadedness, fever, or any other emergent complaints or changes in condition.

## 2017-11-25 NOTE — PATIENT INSTRUCTIONS
Treating Gout Attacks     Raising the joint above the level of your heart can help reduce gout symptoms.     Gout is a disease that affects the joints. It is caused by excess uric acid in your blood stream that may lead to crystals forming in your joints. Left untreated, it can lead to painful foot and joint deformities and even kidney problems. But, by treating gout early, you can relieve pain and help prevent future problems. Gout can usually be treated with medication and proper diet. In severe cases, surgery may be needed.  Gout attacks are painful and often happen more than once. Taking medications may reduce pain and prevent attacks in the future. There are also some things you can do at home to relieve symptoms.  Medications for gout  Your healthcare provider may prescribe a daily medication to reduce levels of uric acid. Reducing your uric acid levels may help prevent gout attacks. Allopurinol is one commonly used medication taken daily to reduce uric acid levels. Other medications can help relieve pain and swelling during an acute attack. Medicines such as NSAIDs (nonsteroidal anti-inflammatory medicines), steroids, and colchicine may be prescribed for intermittent use to relieve an acute gout attack. Be sure to take your medication as directed.  What you can do  Below are some things you can do at home to relieve gout symptoms. Your healthcare provider may have other tips.  · Rest the painful joint as much as you can.  · Raise the painful joint so it is at a level higher than your heart.  · Use ice for 10 minutes every 1-2 hours as possible.  How can I prevent gout?  With a little effort, you may be able to prevent gout attacks in the future. Here are some things you can do:  · Avoid foods high in purines  ¨ Certain meats (red meat, processed meat, turkey)  ¨ Organ meats (kidney, liver, sweetbread)  ¨ Shellfish (lobster, crab, shrimp, scallop, mussel)  ¨ Certain fish (anchovy, sardine, herring,  mackerel)  · Take any medications prescribed by your healthcare provider.  · Lose weight if you need to.  · Reduce high fructose corn syrup in meals and drinks.  · Reduce or eliminate consumption of alcohol, particularly beer, but also red wine and spirits.  · Control blood pressure, diabetes, and cholesterol.  · Drink plenty of water to help flush uric acid from your body.  Date Last Reviewed: 2/1/2016  © 6505-0253 [employer]. [address] [place] [address], [place] [address]. All rights reserved. This information is not intended as a substitute for professional medical care. Always follow your healthcare professional's instructions.

## 2017-11-29 ENCOUNTER — OFFICE VISIT (OUTPATIENT)
Dept: FAMILY MEDICINE | Facility: CLINIC | Age: 62
End: 2017-11-29
Payer: COMMERCIAL

## 2017-11-29 VITALS
HEART RATE: 80 BPM | SYSTOLIC BLOOD PRESSURE: 150 MMHG | BODY MASS INDEX: 33.14 KG/M2 | TEMPERATURE: 98 F | OXYGEN SATURATION: 97 % | WEIGHT: 223.75 LBS | DIASTOLIC BLOOD PRESSURE: 66 MMHG | RESPIRATION RATE: 17 BRPM | HEIGHT: 69 IN

## 2017-11-29 DIAGNOSIS — I10 ESSENTIAL HYPERTENSION: Primary | ICD-10-CM

## 2017-11-29 DIAGNOSIS — M10.072 ACUTE IDIOPATHIC GOUT INVOLVING TOE OF LEFT FOOT: ICD-10-CM

## 2017-11-29 PROCEDURE — 99999 PR PBB SHADOW E&M-EST. PATIENT-LVL III: CPT | Mod: PBBFAC,,, | Performed by: FAMILY MEDICINE

## 2017-11-29 PROCEDURE — 99214 OFFICE O/P EST MOD 30 MIN: CPT | Mod: S$GLB,,, | Performed by: FAMILY MEDICINE

## 2017-11-29 RX ORDER — COLCHICINE 0.6 MG/1
TABLET ORAL
Qty: 30 TABLET | Refills: 2 | Status: SHIPPED | OUTPATIENT
Start: 2017-11-29 | End: 2020-01-10

## 2017-11-29 RX ORDER — AMLODIPINE BESYLATE 10 MG/1
10 TABLET ORAL DAILY
Qty: 30 TABLET | Refills: 1 | Status: SHIPPED | OUTPATIENT
Start: 2017-11-29 | End: 2018-02-02 | Stop reason: SDUPTHER

## 2017-11-29 RX ORDER — INDOMETHACIN 75 MG/1
75 CAPSULE, EXTENDED RELEASE ORAL EVERY 8 HOURS PRN
Qty: 30 CAPSULE | Refills: 2 | Status: SHIPPED | OUTPATIENT
Start: 2017-11-29 | End: 2020-01-10

## 2017-11-29 NOTE — PROGRESS NOTES
Chief Complaint   Patient presents with    Hypertension     discuss about blood pressure medication     Gout     lt big toe       Ascencion Owens is a 62 y.o. male who presents per the Chief Complaint.  Pt is known to me and was last seen by me on 11/16/2017.  All known chronic medical issues have been documented.       Hypertension   This is a chronic problem. The current episode started more than 1 year ago. The problem is unchanged. The problem is controlled. Associated symptoms include headaches (history of cluster headache). Pertinent negatives include no anxiety, blurred vision, chest pain, malaise/fatigue, neck pain, orthopnea, palpitations, peripheral edema, PND, shortness of breath or sweats. There are no associated agents to hypertension. Risk factors for coronary artery disease include male gender, obesity and dyslipidemia. Past treatments include ACE inhibitors and diuretics. The current treatment provides moderate improvement. There are no compliance problems.  There is no history of angina, kidney disease, CAD/MI, CVA, heart failure, left ventricular hypertrophy, PVD, renovascular disease, retinopathy or a thyroid problem. There is no history of chronic renal disease, coarctation of the aorta, hyperaldosteronism, hyperparathyroidism, a hypertension causing med or sleep apnea.   Urinary Frequency    This is a recurrent problem. The current episode started 1 to 4 weeks ago. The problem occurs intermittently. The problem has been waxing and waning. The pain is at a severity of 0/10. The patient is experiencing no pain. There has been no fever. Associated symptoms include frequency. Pertinent negatives include no behavior changes, chills, discharge, flank pain, hematuria, hesitancy, nausea, possible pregnancy, sweats, urgency, vomiting, weight loss, bubble bath use, constipation, rash or withholding. He has tried nothing for the symptoms. His past medical history is significant for hypertension.  There is no history of catheterization, diabetes insipidus, diabetes mellitus, genitourinary reflux, kidney stones, recurrent UTIs, a single kidney, STD, urinary stasis or a urological procedure.   Hyperlipidemia   This is a chronic problem. The current episode started more than 1 year ago. The problem is controlled. Recent lipid tests were reviewed and are low. He has no history of chronic renal disease, diabetes, hypothyroidism, liver disease, obesity or nephrotic syndrome. There are no known factors aggravating his hyperlipidemia. Pertinent negatives include no chest pain, myalgias or shortness of breath. Current antihyperlipidemic treatment includes statins. The current treatment provides significant improvement of lipids. There are no compliance problems.  Risk factors for coronary artery disease include dyslipidemia, hypertension and male sex.        ROS  Review of Systems   Constitutional: Negative.  Negative for activity change, appetite change, chills, diaphoresis, fatigue, fever, malaise/fatigue, unexpected weight change and weight loss.   HENT: Negative.  Negative for congestion, ear pain, hearing loss, nosebleeds, postnasal drip, rhinorrhea, sinus pressure, sneezing, sore throat and trouble swallowing.    Eyes: Negative for blurred vision, pain and visual disturbance.   Respiratory: Negative for cough, choking and shortness of breath.    Cardiovascular: Negative for chest pain, palpitations, orthopnea, leg swelling and PND.   Gastrointestinal: Negative for abdominal pain, constipation, diarrhea, nausea and vomiting.   Genitourinary: Positive for frequency. Negative for difficulty urinating, dysuria, flank pain, hematuria, hesitancy and urgency.   Musculoskeletal: Positive for arthralgias (left big toe pain; improving), gait problem and joint swelling. Negative for back pain, myalgias and neck pain.   Skin: Negative.  Negative for rash.   Allergic/Immunologic: Negative for environmental allergies and food  "allergies.   Neurological: Positive for headaches (history of cluster headache). Negative for dizziness, seizures, syncope, weakness and light-headedness.   Psychiatric/Behavioral: Negative.  Negative for confusion, decreased concentration, dysphoric mood and sleep disturbance. The patient is not nervous/anxious.        Physical Exam  Vitals:    11/29/17 1535   BP: (!) 150/66   Pulse:    Resp:    Temp:     Body mass index is 33.04 kg/m².  Weight: 101.5 kg (223 lb 12.3 oz)   Height: 5' 9" (175.3 cm)     Physical Exam   Constitutional: He is oriented to person, place, and time. He appears well-developed and well-nourished. He is active and cooperative.  Non-toxic appearance. He does not have a sickly appearance. He does not appear ill. No distress.   HENT:   Head: Normocephalic and atraumatic.   Right Ear: Hearing and external ear normal. No decreased hearing is noted.   Left Ear: Hearing and external ear normal. No decreased hearing is noted.   Nose: Nose normal. No rhinorrhea or nasal deformity.   Mouth/Throat: Uvula is midline and oropharynx is clear and moist. He does not have dentures. Normal dentition.   Eyes: Conjunctivae, EOM and lids are normal. Pupils are equal, round, and reactive to light. Right eye exhibits no chemosis, no discharge and no exudate. No foreign body present in the right eye. Left eye exhibits no chemosis, no discharge and no exudate. No foreign body present in the left eye. No scleral icterus.   Neck: Normal range of motion and full passive range of motion without pain. Neck supple.   Cardiovascular: Normal rate, regular rhythm, S1 normal, S2 normal and normal heart sounds.  Exam reveals no gallop and no friction rub.    No murmur heard.  Pulmonary/Chest: Effort normal and breath sounds normal. No accessory muscle usage. No respiratory distress. He has no decreased breath sounds. He has no wheezes. He has no rhonchi. He has no rales.   Abdominal: Soft. Normal appearance. He exhibits no " distension. There is no hepatosplenomegaly. There is no tenderness. There is no rigidity, no rebound and no guarding.   Musculoskeletal: Normal range of motion.   Neurological: He is alert and oriented to person, place, and time. He has normal strength. No cranial nerve deficit or sensory deficit. He exhibits normal muscle tone. He displays no seizure activity. Coordination and gait normal.   Skin: Skin is warm, dry and intact. No rash noted. He is not diaphoretic.   Psychiatric: He has a normal mood and affect. His speech is normal and behavior is normal. Judgment and thought content normal. Cognition and memory are normal. He is attentive.       Assessment & Plan    1. Essential hypertension  Patient was counseled and encouraged to maintain a low sodium diet, as well as increasing physical activity.  Recommend random BP checks at home on a regular basis.  Repeat BP at end of visit was improved. Will continue medication at this time, and follow up in 4-6 weeks, or sooner if blood pressure does not improve over time.  Patient advised to return for nurse BP check in one week.   - amLODIPine (NORVASC) 10 MG tablet; Take 1 tablet (10 mg total) by mouth once daily.  Dispense: 30 tablet; Refill: 1    2. Acute idiopathic gout involving toe of left foot  Will start alternate NSAID for acute pain; discussed use of colchicine at onset of pain and NSAID as back up pain reliever.  - colchicine (COLCRYS) 0.6 mg tablet; Take 2 tablets at onset of pain, then 1 tablet in an hour if pain persist.  Do not take more than 3 pills in a day.  Dispense: 30 tablet; Refill: 2  - indomethacin (INDOCIN SR) 75 mg CpSR CR capsule; Take 1 capsule (75 mg total) by mouth every 8 (eight) hours as needed.  Dispense: 30 capsule; Refill: 2      Follow up documented    ACTIVE MEDICAL ISSUES:  Documented in Problem List    PAST MEDICAL HISTORY  Documented    PAST SURGICAL HISTORY:  Documented    SOCIAL HISTORY:  Documented    FAMILY  HISTORY:  Documented    ALLERGIES AND MEDICATIONS: updated and reviewed.  Documented    Health Maintenance       Date Due Completion Date    Lipid Panel 11/15/2022 11/15/2017    Colonoscopy 06/10/2025 6/10/2015    TETANUS VACCINE 05/10/2026 5/10/2016 (Done)    Override on 5/10/2016: Done (future)

## 2018-01-08 DIAGNOSIS — I10 ESSENTIAL HYPERTENSION: ICD-10-CM

## 2018-01-09 RX ORDER — LISINOPRIL AND HYDROCHLOROTHIAZIDE 12.5; 2 MG/1; MG/1
TABLET ORAL
Qty: 90 TABLET | Refills: 0 | Status: SHIPPED | OUTPATIENT
Start: 2018-01-09 | End: 2018-02-02 | Stop reason: ALTCHOICE

## 2018-02-02 ENCOUNTER — OFFICE VISIT (OUTPATIENT)
Dept: FAMILY MEDICINE | Facility: CLINIC | Age: 63
End: 2018-02-02
Payer: COMMERCIAL

## 2018-02-02 VITALS
SYSTOLIC BLOOD PRESSURE: 138 MMHG | HEART RATE: 72 BPM | HEIGHT: 69 IN | WEIGHT: 223.13 LBS | DIASTOLIC BLOOD PRESSURE: 88 MMHG | BODY MASS INDEX: 33.05 KG/M2 | TEMPERATURE: 99 F | OXYGEN SATURATION: 97 % | RESPIRATION RATE: 17 BRPM

## 2018-02-02 DIAGNOSIS — M10.072 ACUTE IDIOPATHIC GOUT INVOLVING TOE OF LEFT FOOT: ICD-10-CM

## 2018-02-02 DIAGNOSIS — I10 ESSENTIAL HYPERTENSION: Primary | ICD-10-CM

## 2018-02-02 PROCEDURE — 99214 OFFICE O/P EST MOD 30 MIN: CPT | Mod: S$GLB,,, | Performed by: FAMILY MEDICINE

## 2018-02-02 PROCEDURE — 99999 PR PBB SHADOW E&M-EST. PATIENT-LVL III: CPT | Mod: PBBFAC,,, | Performed by: FAMILY MEDICINE

## 2018-02-02 PROCEDURE — 3008F BODY MASS INDEX DOCD: CPT | Mod: S$GLB,,, | Performed by: FAMILY MEDICINE

## 2018-02-02 RX ORDER — AMLODIPINE BESYLATE 10 MG/1
10 TABLET ORAL DAILY
Qty: 30 TABLET | Refills: 0 | Status: SHIPPED | OUTPATIENT
Start: 2018-02-02 | End: 2018-02-02 | Stop reason: SDUPTHER

## 2018-02-02 RX ORDER — AMLODIPINE BESYLATE 10 MG/1
10 TABLET ORAL DAILY
Qty: 90 TABLET | Refills: 1 | Status: SHIPPED | OUTPATIENT
Start: 2018-02-02 | End: 2018-05-23 | Stop reason: SDUPTHER

## 2018-02-02 NOTE — PROGRESS NOTES
Chief Complaint   Patient presents with    Hypertension     follow up     rt big toe problem       Ascencion Owens is a 62 y.o. male who presents per the Chief Complaint.  Pt is known to me and was last seen by me on 11/29/2017.  All known chronic medical issues have been documented.       Hypertension   This is a chronic problem. The current episode started more than 1 year ago. The problem is unchanged. The problem is controlled. Pertinent negatives include no anxiety, blurred vision, chest pain, headaches (history of cluster headache), malaise/fatigue, neck pain, orthopnea, palpitations, peripheral edema, PND, shortness of breath or sweats. There are no associated agents to hypertension. Risk factors for coronary artery disease include male gender, obesity and dyslipidemia. Past treatments include ACE inhibitors and diuretics. The current treatment provides moderate improvement. There are no compliance problems.  There is no history of angina, kidney disease, CAD/MI, CVA, heart failure, left ventricular hypertrophy, PVD, renovascular disease or retinopathy. There is no history of chronic renal disease, coarctation of the aorta, hyperaldosteronism, hyperparathyroidism, a hypertension causing med, sleep apnea or a thyroid problem.        ROS  Review of Systems   Constitutional: Negative.  Negative for activity change, appetite change, chills, diaphoresis, fatigue, fever, malaise/fatigue, unexpected weight change and weight loss.   HENT: Negative.  Negative for congestion, ear pain, hearing loss, nosebleeds, postnasal drip, rhinorrhea, sinus pressure, sneezing, sore throat and trouble swallowing.    Eyes: Negative for blurred vision, pain and visual disturbance.   Respiratory: Negative for cough, choking and shortness of breath.    Cardiovascular: Negative for chest pain, palpitations, orthopnea, leg swelling and PND.   Gastrointestinal: Negative for abdominal pain, constipation, diarrhea, nausea and  "vomiting.   Genitourinary: Negative for difficulty urinating, dysuria, flank pain, frequency, hematuria, hesitancy and urgency.   Musculoskeletal: Negative.  Negative for arthralgias, back pain, gait problem, joint swelling, myalgias and neck pain.   Skin: Negative.  Negative for rash.   Allergic/Immunologic: Negative for environmental allergies and food allergies.   Neurological: Negative.  Negative for dizziness, seizures, syncope, weakness, light-headedness and headaches (history of cluster headache).   Psychiatric/Behavioral: Negative.  Negative for confusion, decreased concentration, dysphoric mood and sleep disturbance. The patient is not nervous/anxious.        Physical Exam  Vitals:    02/02/18 0927   BP: 138/88   Pulse: 72   Resp: 17   Temp: 98.5 °F (36.9 °C)    Body mass index is 32.95 kg/m².  Weight: 101.2 kg (223 lb 1.7 oz)   Height: 5' 9" (175.3 cm)     Physical Exam   Constitutional: He is oriented to person, place, and time. He appears well-developed and well-nourished. He is active and cooperative.  Non-toxic appearance. He does not have a sickly appearance. He does not appear ill. No distress.   HENT:   Head: Normocephalic and atraumatic.   Right Ear: Hearing and external ear normal. No decreased hearing is noted.   Left Ear: Hearing and external ear normal. No decreased hearing is noted.   Nose: Nose normal. No rhinorrhea or nasal deformity.   Mouth/Throat: Uvula is midline and oropharynx is clear and moist. He does not have dentures. Normal dentition.   Eyes: Conjunctivae, EOM and lids are normal. Pupils are equal, round, and reactive to light. Right eye exhibits no chemosis, no discharge and no exudate. No foreign body present in the right eye. Left eye exhibits no chemosis, no discharge and no exudate. No foreign body present in the left eye. No scleral icterus.   Neck: Normal range of motion and full passive range of motion without pain. Neck supple.   Cardiovascular: Normal rate, regular " rhythm, S1 normal, S2 normal and normal heart sounds.  Exam reveals no gallop and no friction rub.    No murmur heard.  Pulmonary/Chest: Effort normal and breath sounds normal. No accessory muscle usage. No respiratory distress. He has no decreased breath sounds. He has no wheezes. He has no rhonchi. He has no rales.   Abdominal: Soft. Normal appearance. He exhibits no distension. There is no hepatosplenomegaly. There is no tenderness. There is no rigidity, no rebound and no guarding.   Musculoskeletal: Normal range of motion.        Feet:    Neurological: He is alert and oriented to person, place, and time. He has normal strength. No cranial nerve deficit or sensory deficit. He exhibits normal muscle tone. He displays no seizure activity. Coordination and gait normal.   Skin: Skin is warm, dry and intact. No rash noted. He is not diaphoretic.   Psychiatric: He has a normal mood and affect. His speech is normal and behavior is normal. Judgment and thought content normal. Cognition and memory are normal. He is attentive.       Assessment & Plan    1. Essential hypertension  Patient was counseled and encouraged to maintain a low sodium diet, as well as increasing physical activity.  Recommend random BP checks at home on a regular basis.  Repeat BP at end of visit was not necessary. Will continue medication at this time, and follow up in 3-6 months, or sooner if blood pressure begins to increase.     - amLODIPine (NORVASC) 10 MG tablet; Take 1 tablet (10 mg total) by mouth once daily.  Dispense: 90 tablet; Refill: 1    2. Acute idiopathic gout involving toe of left foot  Stable; no therapeutic changes at this time.  Current pain likely related to mild bunion formation; continue NSAIDS as needed for pain.      Follow up documented    ACTIVE MEDICAL ISSUES:  Documented in Problem List    PAST MEDICAL HISTORY  Documented    PAST SURGICAL HISTORY:  Documented    SOCIAL HISTORY:  Documented    FAMILY  HISTORY:  Documented    ALLERGIES AND MEDICATIONS: updated and reviewed.  Documented    Health Maintenance       Date Due Completion Date    Lipid Panel 11/15/2022 11/15/2017    Colonoscopy 06/10/2025 6/10/2015    TETANUS VACCINE 05/10/2026 5/10/2016 (Done)    Override on 5/10/2016: Done (future)

## 2018-03-15 ENCOUNTER — OFFICE VISIT (OUTPATIENT)
Dept: FAMILY MEDICINE | Facility: CLINIC | Age: 63
End: 2018-03-15
Payer: COMMERCIAL

## 2018-03-15 VITALS
WEIGHT: 223.56 LBS | HEIGHT: 69 IN | DIASTOLIC BLOOD PRESSURE: 88 MMHG | HEART RATE: 79 BPM | OXYGEN SATURATION: 96 % | SYSTOLIC BLOOD PRESSURE: 148 MMHG | TEMPERATURE: 99 F | RESPIRATION RATE: 18 BRPM | BODY MASS INDEX: 33.11 KG/M2

## 2018-03-15 DIAGNOSIS — H11.001 PTERYGIUM EYE, RIGHT: Primary | ICD-10-CM

## 2018-03-15 DIAGNOSIS — H57.89 EYE REDNESS: ICD-10-CM

## 2018-03-15 PROCEDURE — 3077F SYST BP >= 140 MM HG: CPT | Mod: CPTII,S$GLB,, | Performed by: PHYSICIAN ASSISTANT

## 2018-03-15 PROCEDURE — 99999 PR PBB SHADOW E&M-EST. PATIENT-LVL V: CPT | Mod: PBBFAC,,, | Performed by: PHYSICIAN ASSISTANT

## 2018-03-15 PROCEDURE — 99213 OFFICE O/P EST LOW 20 MIN: CPT | Mod: SA,S$GLB,, | Performed by: PHYSICIAN ASSISTANT

## 2018-03-15 PROCEDURE — 3079F DIAST BP 80-89 MM HG: CPT | Mod: CPTII,S$GLB,, | Performed by: PHYSICIAN ASSISTANT

## 2018-03-15 RX ORDER — KETOTIFEN FUMARATE 0.35 MG/ML
1 SOLUTION/ DROPS OPHTHALMIC 2 TIMES DAILY
Qty: 5 ML | Refills: 0 | Status: SHIPPED | OUTPATIENT
Start: 2018-03-15 | End: 2020-01-10

## 2018-03-15 NOTE — PROGRESS NOTES
Subjective:       Patient ID: Ascencion Owens is a 62 y.o. male.    Chief Complaint: Eye Problem (cyst on right eye for 1 day)    Eye Problem    The right eye is affected. This is a new problem. The current episode started today. The problem occurs constantly. The problem has been unchanged. There was no injury mechanism. The patient is experiencing no pain. He does not wear contacts. Associated symptoms include eye redness. Pertinent negatives include no blurred vision, double vision, fever, foreign body sensation, itching, photophobia or recent URI. He has tried nothing for the symptoms.     Review of Systems   Constitutional: Negative for fever.   Eyes: Positive for redness. Negative for blurred vision, double vision, photophobia and itching.       Objective:      Physical Exam   Eyes: EOM are normal. Pupils are equal, round, and reactive to light. Right eye exhibits no exudate and no hordeolum. No foreign body present in the right eye. Left eye exhibits no exudate and no hordeolum. No foreign body present in the left eye. Right conjunctiva is injected. Right conjunctiva has no hemorrhage.           Assessment:       1. Pterygium eye, right    2. Eye redness        Plan:         Ascencion was seen today for eye problem.    Diagnoses and all orders for this visit:    Pterygium eye, right  -     ketotifen (ZADITOR) 0.025 % (0.035 %) ophthalmic solution; Place 1 drop into the right eye 2 (two) times daily.  -     Ambulatory referral to Optometry    Eye redness  -     Ambulatory referral to Optometry      Follow up in 1 month for BP

## 2018-03-19 ENCOUNTER — TELEPHONE (OUTPATIENT)
Dept: OPTOMETRY | Facility: CLINIC | Age: 63
End: 2018-03-19

## 2018-05-23 ENCOUNTER — OFFICE VISIT (OUTPATIENT)
Dept: FAMILY MEDICINE | Facility: CLINIC | Age: 63
End: 2018-05-23
Payer: COMMERCIAL

## 2018-05-23 VITALS
HEART RATE: 66 BPM | RESPIRATION RATE: 16 BRPM | HEIGHT: 69 IN | OXYGEN SATURATION: 97 % | SYSTOLIC BLOOD PRESSURE: 134 MMHG | DIASTOLIC BLOOD PRESSURE: 84 MMHG | BODY MASS INDEX: 32.92 KG/M2 | TEMPERATURE: 98 F | WEIGHT: 222.25 LBS

## 2018-05-23 DIAGNOSIS — H11.153 PINGUECULA OF BOTH EYES: ICD-10-CM

## 2018-05-23 DIAGNOSIS — M10.072 ACUTE IDIOPATHIC GOUT INVOLVING TOE OF LEFT FOOT: ICD-10-CM

## 2018-05-23 DIAGNOSIS — E78.00 PURE HYPERCHOLESTEROLEMIA: ICD-10-CM

## 2018-05-23 DIAGNOSIS — Z00.00 WELL ADULT EXAM: Primary | ICD-10-CM

## 2018-05-23 DIAGNOSIS — I10 ESSENTIAL HYPERTENSION: ICD-10-CM

## 2018-05-23 PROCEDURE — 99396 PREV VISIT EST AGE 40-64: CPT | Mod: S$GLB,,, | Performed by: FAMILY MEDICINE

## 2018-05-23 PROCEDURE — 3075F SYST BP GE 130 - 139MM HG: CPT | Mod: CPTII,S$GLB,, | Performed by: FAMILY MEDICINE

## 2018-05-23 PROCEDURE — 99999 PR PBB SHADOW E&M-EST. PATIENT-LVL III: CPT | Mod: PBBFAC,,, | Performed by: FAMILY MEDICINE

## 2018-05-23 PROCEDURE — 3079F DIAST BP 80-89 MM HG: CPT | Mod: CPTII,S$GLB,, | Performed by: FAMILY MEDICINE

## 2018-05-23 RX ORDER — ATORVASTATIN CALCIUM 20 MG/1
20 TABLET, FILM COATED ORAL NIGHTLY
Qty: 90 TABLET | Refills: 1 | Status: SHIPPED | OUTPATIENT
Start: 2018-05-23 | End: 2018-11-18 | Stop reason: SDUPTHER

## 2018-05-23 RX ORDER — AMLODIPINE BESYLATE 10 MG/1
10 TABLET ORAL DAILY
Qty: 90 TABLET | Refills: 1 | Status: SHIPPED | OUTPATIENT
Start: 2018-05-23 | End: 2019-01-04 | Stop reason: SDUPTHER

## 2018-05-23 NOTE — PROGRESS NOTES
Chief Complaint   Patient presents with    Hypertension     follow up    Gout       Ascencion Owens is a 62 y.o. male who presents per the Chief Complaint.  Pt is known to me and was last seen by me on 2/2/2018.  All known chronic medical issues have been documented.       Hypertension   This is a chronic problem. The current episode started more than 1 year ago. The problem is unchanged. The problem is controlled. Pertinent negatives include no anxiety, blurred vision, chest pain, headaches (history of cluster headache), malaise/fatigue, neck pain, orthopnea, palpitations, peripheral edema, PND, shortness of breath or sweats. There are no associated agents to hypertension. Risk factors for coronary artery disease include male gender, obesity and dyslipidemia. Past treatments include ACE inhibitors, diuretics and calcium channel blockers. The current treatment provides moderate improvement. There are no compliance problems.  There is no history of angina, kidney disease, CAD/MI, CVA, heart failure, left ventricular hypertrophy, PVD or retinopathy. There is no history of chronic renal disease, coarctation of the aorta, hyperaldosteronism, hyperparathyroidism, a hypertension causing med, renovascular disease, sleep apnea or a thyroid problem.        ROS  Review of Systems   Constitutional: Negative.  Negative for activity change, appetite change, chills, diaphoresis, fatigue, fever, malaise/fatigue and unexpected weight change.   HENT: Negative.  Negative for congestion, ear pain, hearing loss, nosebleeds, postnasal drip, rhinorrhea, sinus pressure, sneezing, sore throat and trouble swallowing.    Eyes: Negative for blurred vision, pain and visual disturbance.        Was told he has 4 pinguecula on both eyelids   Respiratory: Negative for cough, choking and shortness of breath.    Cardiovascular: Positive for leg swelling (bilateral ankle). Negative for chest pain, palpitations, orthopnea and PND.  "  Gastrointestinal: Negative for abdominal pain, constipation, diarrhea, nausea and vomiting.   Genitourinary: Negative for difficulty urinating, dysuria, flank pain, frequency, hematuria and urgency.   Musculoskeletal: Negative.  Negative for arthralgias, back pain, gait problem, joint swelling, myalgias and neck pain.   Skin: Negative.  Negative for rash.   Allergic/Immunologic: Negative for environmental allergies and food allergies.   Neurological: Negative.  Negative for dizziness, seizures, syncope, weakness, light-headedness and headaches (history of cluster headache).   Psychiatric/Behavioral: Negative.  Negative for confusion, decreased concentration, dysphoric mood and sleep disturbance. The patient is not nervous/anxious.        Physical Exam  Vitals:    05/23/18 1336   BP: 134/84   Pulse: 66   Resp: 16   Temp: 98.3 °F (36.8 °C)    Body mass index is 32.82 kg/m².  Weight: 100.8 kg (222 lb 3.6 oz)   Height: 5' 9" (175.3 cm)     Physical Exam   Constitutional: He is oriented to person, place, and time. He appears well-developed and well-nourished. He is active and cooperative.  Non-toxic appearance. He does not have a sickly appearance. He does not appear ill. No distress.   HENT:   Head: Normocephalic and atraumatic.   Right Ear: Hearing and external ear normal. No decreased hearing is noted.   Left Ear: Hearing and external ear normal. No decreased hearing is noted.   Nose: Nose normal. No rhinorrhea or nasal deformity.   Mouth/Throat: Uvula is midline and oropharynx is clear and moist. He does not have dentures. Normal dentition.   Eyes: Conjunctivae, EOM and lids are normal. Pupils are equal, round, and reactive to light. Right eye exhibits no chemosis, no discharge and no exudate. No foreign body present in the right eye. Left eye exhibits no chemosis, no discharge and no exudate. No foreign body present in the left eye. No scleral icterus.   Neck: Normal range of motion and full passive range of " motion without pain. Neck supple.   Cardiovascular: Normal rate, regular rhythm, S1 normal, S2 normal and normal heart sounds.  Exam reveals no gallop and no friction rub.    No murmur heard.  Pulmonary/Chest: Effort normal and breath sounds normal. No accessory muscle usage. No respiratory distress. He has no decreased breath sounds. He has no wheezes. He has no rhonchi. He has no rales.   Abdominal: Soft. Normal appearance. He exhibits no distension. There is no hepatosplenomegaly. There is no tenderness. There is no rigidity, no rebound and no guarding.   Musculoskeletal: Normal range of motion.   Neurological: He is alert and oriented to person, place, and time. He has normal strength. No cranial nerve deficit or sensory deficit. He exhibits normal muscle tone. He displays no seizure activity. Coordination and gait normal.   Skin: Skin is warm, dry and intact. No rash noted. He is not diaphoretic.   Psychiatric: He has a normal mood and affect. His speech is normal and behavior is normal. Judgment and thought content normal. Cognition and memory are normal. He is attentive.       Assessment & Plan    1. Well adult exam  Discussed age and gender appropriate screenings at this visit and encouraged a healthy diet with low saturated fats, and increased physical activity.  Counseled on medically appropriate vaccines based on age and current health condition.  Screening test will be ordered and once completed, patient will be notified of results when available.  If necessary, will follow up to discuss and manage further.   - CBC auto differential; Future  - Comprehensive metabolic panel; Future  - Lipid panel; Future  - Hemoglobin A1c; Future  - PSA, Screening; Future    2. Essential hypertension  Patient was counseled and encouraged to maintain a low sodium diet, as well as increasing physical activity.  Recommend random BP checks at home on a regular basis.  Repeat BP at end of visit was not necessary. Will continue  medication at this time, and follow up in 3-6 months, or sooner if blood pressure begins to increase.     - amLODIPine (NORVASC) 10 MG tablet; Take 1 tablet (10 mg total) by mouth once daily.  Dispense: 90 tablet; Refill: 1    3. Acute idiopathic gout involving toe of left foot  Stable; no therapeutic changes at this time.     4. Pure hypercholesterolemia  Stable; no therapeutic changes at this time.   - atorvastatin (LIPITOR) 20 MG tablet; Take 1 tablet (20 mg total) by mouth nightly.  Dispense: 90 tablet; Refill: 1    5. Pinguecula of both eyes  Managed by Optometry; was advised his pressure in his eyes is elevated.  Recently had laser procedure and will follow up to determine effectiveness.      Follow up documented    ACTIVE MEDICAL ISSUES:  Documented in Problem List    PAST MEDICAL HISTORY  Documented    PAST SURGICAL HISTORY:  Documented    SOCIAL HISTORY:  Documented    FAMILY HISTORY:  Documented    ALLERGIES AND MEDICATIONS: updated and reviewed.  Documented    Health Maintenance       Date Due Completion Date    Influenza Vaccine 08/01/2018 9/23/2017    Override on 12/4/2015: Done (today)    Override on 10/7/2014: (N/S)    Override on 1/1/2014: (N/S) (per pt)    Lipid Panel 11/15/2022 11/15/2017    Colonoscopy 06/10/2025 6/10/2015    TETANUS VACCINE 05/10/2026 5/10/2016 (Done)    Override on 5/10/2016: Done (future)

## 2018-05-31 ENCOUNTER — LAB VISIT (OUTPATIENT)
Dept: LAB | Facility: HOSPITAL | Age: 63
End: 2018-05-31
Attending: FAMILY MEDICINE
Payer: COMMERCIAL

## 2018-05-31 DIAGNOSIS — Z00.00 WELL ADULT EXAM: ICD-10-CM

## 2018-05-31 LAB
ALBUMIN SERPL BCP-MCNC: 4.1 G/DL
ALP SERPL-CCNC: 73 U/L
ALT SERPL W/O P-5'-P-CCNC: 21 U/L
ANION GAP SERPL CALC-SCNC: 9 MMOL/L
AST SERPL-CCNC: 26 U/L
BASOPHILS # BLD AUTO: 0.05 K/UL
BASOPHILS NFR BLD: 0.7 %
BILIRUB SERPL-MCNC: 0.8 MG/DL
BUN SERPL-MCNC: 17 MG/DL
CALCIUM SERPL-MCNC: 9.8 MG/DL
CHLORIDE SERPL-SCNC: 106 MMOL/L
CHOLEST SERPL-MCNC: 153 MG/DL
CHOLEST/HDLC SERPL: 3.3 {RATIO}
CO2 SERPL-SCNC: 28 MMOL/L
COMPLEXED PSA SERPL-MCNC: 0.39 NG/ML
CREAT SERPL-MCNC: 1.2 MG/DL
DIFFERENTIAL METHOD: ABNORMAL
EOSINOPHIL # BLD AUTO: 0.1 K/UL
EOSINOPHIL NFR BLD: 1.3 %
ERYTHROCYTE [DISTWIDTH] IN BLOOD BY AUTOMATED COUNT: 14 %
EST. GFR  (AFRICAN AMERICAN): >60 ML/MIN/1.73 M^2
EST. GFR  (NON AFRICAN AMERICAN): >60 ML/MIN/1.73 M^2
ESTIMATED AVG GLUCOSE: 120 MG/DL
GLUCOSE SERPL-MCNC: 97 MG/DL
HBA1C MFR BLD HPLC: 5.8 %
HCT VFR BLD AUTO: 46.1 %
HDLC SERPL-MCNC: 47 MG/DL
HDLC SERPL: 30.7 %
HGB BLD-MCNC: 15.4 G/DL
IMM GRANULOCYTES # BLD AUTO: 0.01 K/UL
IMM GRANULOCYTES NFR BLD AUTO: 0.1 %
LDLC SERPL CALC-MCNC: 97 MG/DL
LYMPHOCYTES # BLD AUTO: 1.8 K/UL
LYMPHOCYTES NFR BLD: 24.7 %
MCH RBC QN AUTO: 27.2 PG
MCHC RBC AUTO-ENTMCNC: 33.4 G/DL
MCV RBC AUTO: 81 FL
MONOCYTES # BLD AUTO: 0.8 K/UL
MONOCYTES NFR BLD: 11.2 %
NEUTROPHILS # BLD AUTO: 4.4 K/UL
NEUTROPHILS NFR BLD: 62 %
NONHDLC SERPL-MCNC: 106 MG/DL
NRBC BLD-RTO: 0 /100 WBC
PLATELET # BLD AUTO: 229 K/UL
PMV BLD AUTO: 11.5 FL
POTASSIUM SERPL-SCNC: 4 MMOL/L
PROT SERPL-MCNC: 7.7 G/DL
RBC # BLD AUTO: 5.66 M/UL
SODIUM SERPL-SCNC: 143 MMOL/L
TRIGL SERPL-MCNC: 45 MG/DL
WBC # BLD AUTO: 7.13 K/UL

## 2018-05-31 PROCEDURE — 85025 COMPLETE CBC W/AUTO DIFF WBC: CPT

## 2018-05-31 PROCEDURE — 84153 ASSAY OF PSA TOTAL: CPT

## 2018-05-31 PROCEDURE — 80053 COMPREHEN METABOLIC PANEL: CPT

## 2018-05-31 PROCEDURE — 83036 HEMOGLOBIN GLYCOSYLATED A1C: CPT

## 2018-05-31 PROCEDURE — 36415 COLL VENOUS BLD VENIPUNCTURE: CPT | Mod: PO

## 2018-05-31 PROCEDURE — 80061 LIPID PANEL: CPT

## 2018-06-10 ENCOUNTER — PATIENT MESSAGE (OUTPATIENT)
Dept: FAMILY MEDICINE | Facility: CLINIC | Age: 63
End: 2018-06-10

## 2018-06-11 NOTE — TELEPHONE ENCOUNTER
Spoke to patient.Would like to know what is the difference between the CMP and HGA1C? His HgA1c showed Estimated Avg Glucose 120 and CMP Glucose is 97. Please advise.

## 2018-07-24 ENCOUNTER — PATIENT MESSAGE (OUTPATIENT)
Dept: RESEARCH | Facility: HOSPITAL | Age: 63
End: 2018-07-24

## 2018-11-13 ENCOUNTER — TELEPHONE (OUTPATIENT)
Dept: FAMILY MEDICINE | Facility: CLINIC | Age: 63
End: 2018-11-13

## 2018-11-13 NOTE — TELEPHONE ENCOUNTER
----- Message from Africa Serrato sent at 11/13/2018  1:06 PM CST -----  Contact: Self   Patient states he need lab orders before his appt. On 11/16. Please call at [phone]

## 2018-11-14 NOTE — TELEPHONE ENCOUNTER
Patient currently at [facility] to get labs prior to visit. Notified PCP - stated pt is not due for any labs at this time until next year. Patient next OV will be regarding follow up for htn. Notified phone room. Phone room will notified patient.

## 2018-11-18 DIAGNOSIS — E78.00 PURE HYPERCHOLESTEROLEMIA: ICD-10-CM

## 2018-11-19 RX ORDER — ATORVASTATIN CALCIUM 20 MG/1
TABLET, FILM COATED ORAL
Qty: 90 TABLET | Refills: 0 | Status: SHIPPED | OUTPATIENT
Start: 2018-11-19 | End: 2019-02-12 | Stop reason: SDUPTHER

## 2019-01-04 DIAGNOSIS — I10 ESSENTIAL HYPERTENSION: ICD-10-CM

## 2019-01-07 RX ORDER — AMLODIPINE BESYLATE 10 MG/1
TABLET ORAL
Qty: 90 TABLET | Refills: 1 | Status: SHIPPED | OUTPATIENT
Start: 2019-01-07 | End: 2019-02-12 | Stop reason: SDUPTHER

## 2019-02-12 ENCOUNTER — OFFICE VISIT (OUTPATIENT)
Dept: FAMILY MEDICINE | Facility: CLINIC | Age: 64
End: 2019-02-12
Payer: COMMERCIAL

## 2019-02-12 ENCOUNTER — LAB VISIT (OUTPATIENT)
Dept: LAB | Facility: HOSPITAL | Age: 64
End: 2019-02-12
Attending: FAMILY MEDICINE
Payer: COMMERCIAL

## 2019-02-12 VITALS
DIASTOLIC BLOOD PRESSURE: 74 MMHG | OXYGEN SATURATION: 97 % | RESPIRATION RATE: 17 BRPM | TEMPERATURE: 98 F | WEIGHT: 210.13 LBS | BODY MASS INDEX: 31.12 KG/M2 | HEART RATE: 64 BPM | HEIGHT: 69 IN | SYSTOLIC BLOOD PRESSURE: 110 MMHG

## 2019-02-12 DIAGNOSIS — I10 ESSENTIAL HYPERTENSION: Primary | ICD-10-CM

## 2019-02-12 DIAGNOSIS — R73.03 BORDERLINE DIABETES: ICD-10-CM

## 2019-02-12 DIAGNOSIS — E78.00 PURE HYPERCHOLESTEROLEMIA: ICD-10-CM

## 2019-02-12 LAB
CHOLEST SERPL-MCNC: 149 MG/DL
CHOLEST/HDLC SERPL: 3.3 {RATIO}
ESTIMATED AVG GLUCOSE: 114 MG/DL
HBA1C MFR BLD HPLC: 5.6 %
HDLC SERPL-MCNC: 45 MG/DL
HDLC SERPL: 30.2 %
LDLC SERPL CALC-MCNC: 96 MG/DL
NONHDLC SERPL-MCNC: 104 MG/DL
TRIGL SERPL-MCNC: 40 MG/DL

## 2019-02-12 PROCEDURE — 83036 HEMOGLOBIN GLYCOSYLATED A1C: CPT

## 2019-02-12 PROCEDURE — 36415 COLL VENOUS BLD VENIPUNCTURE: CPT | Mod: PO

## 2019-02-12 PROCEDURE — 99999 PR PBB SHADOW E&M-EST. PATIENT-LVL III: CPT | Mod: PBBFAC,,, | Performed by: FAMILY MEDICINE

## 2019-02-12 PROCEDURE — 99214 PR OFFICE/OUTPT VISIT, EST, LEVL IV, 30-39 MIN: ICD-10-PCS | Mod: S$GLB,,, | Performed by: FAMILY MEDICINE

## 2019-02-12 PROCEDURE — 99214 OFFICE O/P EST MOD 30 MIN: CPT | Mod: S$GLB,,, | Performed by: FAMILY MEDICINE

## 2019-02-12 PROCEDURE — 80061 LIPID PANEL: CPT

## 2019-02-12 PROCEDURE — 99999 PR PBB SHADOW E&M-EST. PATIENT-LVL III: ICD-10-PCS | Mod: PBBFAC,,, | Performed by: FAMILY MEDICINE

## 2019-02-12 RX ORDER — ATORVASTATIN CALCIUM 20 MG/1
20 TABLET, FILM COATED ORAL NIGHTLY
Qty: 90 TABLET | Refills: 1 | Status: SHIPPED | OUTPATIENT
Start: 2019-02-12 | End: 2019-07-17 | Stop reason: SDUPTHER

## 2019-02-12 RX ORDER — AMLODIPINE BESYLATE 10 MG/1
10 TABLET ORAL DAILY
Qty: 90 TABLET | Refills: 1 | Status: SHIPPED | OUTPATIENT
Start: 2019-02-12 | End: 2019-07-17 | Stop reason: SDUPTHER

## 2019-02-12 NOTE — PROGRESS NOTES
Chief Complaint   Patient presents with    urine dark color       Ascencion Owens is a 63 y.o. male who presents per the Chief Complaint.  Pt is known to me and was last seen by me on 5/23/2018.  All known chronic medical issues have been documented.       Hypertension   This is a chronic problem. The current episode started more than 1 year ago. The problem is unchanged. The problem is controlled. Pertinent negatives include no anxiety, blurred vision, chest pain, headaches (history of cluster headache), malaise/fatigue, neck pain, orthopnea, palpitations, peripheral edema, PND, shortness of breath or sweats. There are no associated agents to hypertension. Risk factors for coronary artery disease include male gender, obesity and dyslipidemia. Past treatments include ACE inhibitors, diuretics and calcium channel blockers. The current treatment provides moderate improvement. There are no compliance problems.  There is no history of angina, kidney disease, CAD/MI, CVA, heart failure, left ventricular hypertrophy, PVD or retinopathy. There is no history of chronic renal disease, coarctation of the aorta, hyperaldosteronism, hyperparathyroidism, a hypertension causing med, renovascular disease, sleep apnea or a thyroid problem.        ROS  Review of Systems   Constitutional: Negative.  Negative for activity change, appetite change, chills, diaphoresis, fatigue, fever, malaise/fatigue and unexpected weight change.   HENT: Negative.  Negative for congestion, ear pain, hearing loss, nosebleeds, postnasal drip, rhinorrhea, sinus pressure, sneezing, sore throat and trouble swallowing.    Eyes: Negative for blurred vision, pain and visual disturbance.   Respiratory: Negative for cough, choking and shortness of breath.    Cardiovascular: Negative for chest pain, palpitations, orthopnea, leg swelling and PND.   Gastrointestinal: Negative for abdominal pain, constipation, diarrhea, nausea and vomiting.   Genitourinary:  "Negative for difficulty urinating, dysuria, frequency and urgency.   Musculoskeletal: Negative.  Negative for arthralgias, back pain, gait problem, joint swelling, myalgias and neck pain.   Skin: Negative.    Allergic/Immunologic: Negative for environmental allergies and food allergies.   Neurological: Negative.  Negative for dizziness, seizures, syncope, weakness, light-headedness and headaches (history of cluster headache).   Psychiatric/Behavioral: Negative.  Negative for confusion, decreased concentration, dysphoric mood and sleep disturbance. The patient is not nervous/anxious.        Physical Exam  Vitals:    02/12/19 1133   BP: 110/74   Pulse: 64   Resp: 17   Temp: 98.3 °F (36.8 °C)    Body mass index is 31.03 kg/m².  Weight: 95.3 kg (210 lb 1.6 oz)   Height: 5' 9" (175.3 cm)     Physical Exam   Constitutional: He is oriented to person, place, and time. He appears well-developed and well-nourished. He is active and cooperative.  Non-toxic appearance. He does not have a sickly appearance. He does not appear ill. No distress.   HENT:   Head: Normocephalic and atraumatic.   Right Ear: Hearing and external ear normal. No decreased hearing is noted.   Left Ear: Hearing and external ear normal. No decreased hearing is noted.   Nose: Nose normal. No rhinorrhea or nasal deformity.   Mouth/Throat: Uvula is midline and oropharynx is clear and moist. He does not have dentures. Normal dentition.   Eyes: Conjunctivae, EOM and lids are normal. Pupils are equal, round, and reactive to light. Right eye exhibits no chemosis, no discharge and no exudate. No foreign body present in the right eye. Left eye exhibits no chemosis, no discharge and no exudate. No foreign body present in the left eye. No scleral icterus.   Neck: Normal range of motion and full passive range of motion without pain. Neck supple.   Cardiovascular: Normal rate, regular rhythm, S1 normal, S2 normal and normal heart sounds. Exam reveals no gallop and no " friction rub.   No murmur heard.  Pulmonary/Chest: Effort normal and breath sounds normal. No accessory muscle usage. No respiratory distress. He has no decreased breath sounds. He has no wheezes. He has no rhonchi. He has no rales.   Abdominal: Soft. Normal appearance. He exhibits no distension. There is no hepatosplenomegaly. There is no tenderness. There is no rigidity, no rebound and no guarding.   Musculoskeletal: Normal range of motion.   Neurological: He is alert and oriented to person, place, and time. He has normal strength. No cranial nerve deficit or sensory deficit. He exhibits normal muscle tone. He displays no seizure activity. Coordination and gait normal.   Skin: Skin is warm, dry and intact. No rash noted. He is not diaphoretic.   Psychiatric: He has a normal mood and affect. His speech is normal and behavior is normal. Judgment and thought content normal. Cognition and memory are normal. He is attentive.       Assessment & Plan    1. Essential hypertension  Patient was counseled and encouraged to maintain a low sodium diet, as well as increasing physical activity.  Recommend random BP checks at home on a regular basis.  Repeat BP at end of visit was not necessary. Will continue medication at this time, and follow up in 3-6 months, or sooner if blood pressure begins to increase.     - amLODIPine (NORVASC) 10 MG tablet; Take 1 tablet (10 mg total) by mouth once daily.  Dispense: 90 tablet; Refill: 1    2. Pure hypercholesterolemia  We discussed ways to manage cholesterol levels, including increasing whole grain foods and decreasing fried and fatty foods.  I also recommended OTC Omega 3 and Omega 6 supplements to improve overall cholesterol levels.  Will continue current therapy at this visit; patient is due for screening blood test at this time.   - Lipid panel; Future  - atorvastatin (LIPITOR) 20 MG tablet; Take 1 tablet (20 mg total) by mouth nightly.  Dispense: 90 tablet; Refill: 1    3.  Borderline diabetes  Screening test will be ordered and once results available patient will be notified of results and managed accordingly.  Advised dark urine may have been dehydration; resolved with increased fluid intake.  - Hemoglobin A1c; Future      Follow up documented    ACTIVE MEDICAL ISSUES:  Documented in Problem List    PAST MEDICAL HISTORY  Documented    PAST SURGICAL HISTORY:  Documented    SOCIAL HISTORY:  Documented    FAMILY HISTORY:  Documented    ALLERGIES AND MEDICATIONS: updated and reviewed.  Documented    Health Maintenance       Date Due Completion Date    Lipid Panel 05/31/2023 5/31/2018    Colonoscopy 06/10/2025 6/10/2015    TETANUS VACCINE 05/10/2026 5/10/2016 (Done)    Override on 5/10/2016: Done (future)

## 2019-03-01 ENCOUNTER — NURSE TRIAGE (OUTPATIENT)
Dept: ADMINISTRATIVE | Facility: CLINIC | Age: 64
End: 2019-03-01

## 2019-03-02 NOTE — TELEPHONE ENCOUNTER
"  Reason for Disposition   [1] MILD-MODERATE pain AND [2] not relieved by antacids    Answer Assessment - Initial Assessment Questions  1. LOCATION: "Where does it hurt?"      Pt with sx GERD. Just took zantac.   2. RADIATION: "Does the pain shoot anywhere else?" (e.g., chest, back)     No   3. ONSET: "When did the pain begin?" (e.g., minutes, hours or days ago)      This afternoon, some stomach discomfort last pm and this am. Tried naomi tea. Pt diet off since he is out of town. Ate luci cake this am   4. SUDDEN: "Gradual or sudden onset?"     Pain eased up now   5. PATTERN "Does the pain come and go, or is it constant?"     - If constant: "Is it getting better, staying the same, or worsening?"       (Note: Constant means the pain never goes away completely; most serious pain is constant and it progresses)      - If intermittent: "How long does it last?" "Do you have pain now?"      (Note: Intermittent means the pain goes away completely between bouts)     Not constant. Belching. Comes in surges lasts 1 min  6. SEVERITY: "How bad is the pain?"  (e.g., Scale 1-10; mild, moderate, or severe)     - MILD (1-3): doesn't interfere with normal activities, abdomen soft and not tender to touch      - MODERATE (4-7): interferes with normal activities or awakens from sleep, tender to touch      - SEVERE (8-10): excruciating pain, doubled over, unable to do any normal activities      4-5  7. RECURRENT SYMPTOM: "Have you ever had this type of abdominal pain before?" If so, ask: "When was the last time?" and "What happened that time?"     Yes   8. AGGRAVATING FACTORS: "Does anything seem to cause this pain?" (e.g., foods, stress, alcohol)    Luci cake   9. CARDIAC SYMPTOMS: "Do you have any of the following symptoms: chest pain, difficulty breathing, sweating, nausea?"   no   10. OTHER SYMPTOMS: "Do you have any other symptoms?" (e.g., fever, vomiting, diarrhea)     Vx1, no nausea, diarrhea. Grandson also with diarrhea. " afeb    Protocols used: ST ABDOMINAL PAIN - UPPER-A-AH  Pt called re gerd- sx real bad. Took ES zantac. Pt questions taking second dose. rec to follow package directions. Pt currently out of town. Vx1. Pt declines UC as he just changed insurances. Pt states that he will rest and monitor sx. Call back with questions.

## 2019-03-04 ENCOUNTER — TELEPHONE (OUTPATIENT)
Dept: FAMILY MEDICINE | Facility: CLINIC | Age: 64
End: 2019-03-04

## 2019-03-04 NOTE — TELEPHONE ENCOUNTER
Attempt to contact patient to inquire of how he is feeling. No answer. Left message to return call to clinic or to send message thru [facility].

## 2019-03-04 NOTE — TELEPHONE ENCOUNTER
Attempt to contact patient to inquire how he is feeling. No answer. Left message to return call to clinic.

## 2019-03-04 NOTE — TELEPHONE ENCOUNTER
----- Message from [facility] sent at 3/4/2019  2:41 PM CST -----  Contact: pt  Name of Who is Calling: pt      What is the request in detail: pt returned the nurse's phone call. Call pt. Didn't say why.       Can the clinic reply by [facility]: no      [facility] [phone]

## 2019-07-17 ENCOUNTER — NURSE TRIAGE (OUTPATIENT)
Dept: ADMINISTRATIVE | Facility: CLINIC | Age: 64
End: 2019-07-17

## 2019-07-17 DIAGNOSIS — I10 ESSENTIAL HYPERTENSION: ICD-10-CM

## 2019-07-17 DIAGNOSIS — E78.00 PURE HYPERCHOLESTEROLEMIA: ICD-10-CM

## 2019-07-17 RX ORDER — AMLODIPINE BESYLATE 10 MG/1
10 TABLET ORAL DAILY
Qty: 90 TABLET | Refills: 0 | Status: SHIPPED | OUTPATIENT
Start: 2019-07-17 | End: 2019-08-13 | Stop reason: SDUPTHER

## 2019-07-17 RX ORDER — ATORVASTATIN CALCIUM 20 MG/1
20 TABLET, FILM COATED ORAL NIGHTLY
Qty: 90 TABLET | Refills: 1 | Status: SHIPPED | OUTPATIENT
Start: 2019-07-17 | End: 2019-08-13 | Stop reason: SDUPTHER

## 2019-07-17 NOTE — TELEPHONE ENCOUNTER
"Patient states that he was trying to get a prescription for one dose for amlodipine and atorvastatin for the night. Patient is out in [place] and did not bring his medication. Patient states that he spoke with some one at [facility] who states that they would call him in a prescription but has not heard back. Spoke with Dr. Frankel, internal medcine, on call who states it is ok for him to skip tonight's dose and to resume his medication as prescribed tomorrow. Patient verbalized understanding.     Reason for Disposition   [1] Request for URGENT new prescription or refill of "essential" medication (i.e., likelihood of harm to patient if not taken) AND [2] triager unable to fill per unit policy    Protocols used: ST MEDICATION QUESTION CALL-A-AH      "

## 2019-08-13 DIAGNOSIS — I10 ESSENTIAL HYPERTENSION: ICD-10-CM

## 2019-08-13 DIAGNOSIS — E78.00 PURE HYPERCHOLESTEROLEMIA: ICD-10-CM

## 2019-08-14 RX ORDER — AMLODIPINE BESYLATE 10 MG/1
10 TABLET ORAL DAILY
Qty: 90 TABLET | Refills: 0 | Status: SHIPPED | OUTPATIENT
Start: 2019-08-14 | End: 2019-11-01 | Stop reason: SDUPTHER

## 2019-08-14 RX ORDER — ATORVASTATIN CALCIUM 20 MG/1
20 TABLET, FILM COATED ORAL NIGHTLY
Qty: 90 TABLET | Refills: 1 | Status: SHIPPED | OUTPATIENT
Start: 2019-08-14 | End: 2020-01-10 | Stop reason: SDUPTHER

## 2019-08-14 NOTE — TELEPHONE ENCOUNTER
----- Message from Natali Glez sent at 8/14/2019  9:15 AM CDT -----  Type: Patient Call Back    Who called: pt     What is the request in detail: pt has a complaint regarding his medication request. Please contact back regarding.     Can the clinic reply by [facility]? No     Would the patient rather a call back or a response via [facility]? Call back     Best call back number: [phone]    Additional Information:

## 2019-08-14 NOTE — TELEPHONE ENCOUNTER
Spoke to patient. States that back in July he was out of town and forgot his medication. He called to get a short supply sent to the [facility] in [place]. He normally gets his medication thru the mail service but since that happened the mail service canceled his prescription. He needs a new prescription sent to the [facility] mail service. Please advise.

## 2019-08-19 ENCOUNTER — OFFICE VISIT (OUTPATIENT)
Dept: FAMILY MEDICINE | Facility: CLINIC | Age: 64
End: 2019-08-19
Payer: COMMERCIAL

## 2019-08-19 VITALS
BODY MASS INDEX: 33.66 KG/M2 | TEMPERATURE: 98 F | HEART RATE: 64 BPM | HEIGHT: 66 IN | SYSTOLIC BLOOD PRESSURE: 132 MMHG | OXYGEN SATURATION: 97 % | DIASTOLIC BLOOD PRESSURE: 80 MMHG | WEIGHT: 209.44 LBS

## 2019-08-19 DIAGNOSIS — R42 VERTIGO: Primary | ICD-10-CM

## 2019-08-19 PROCEDURE — 99214 PR OFFICE/OUTPT VISIT, EST, LEVL IV, 30-39 MIN: ICD-10-PCS | Mod: S$GLB,,, | Performed by: FAMILY MEDICINE

## 2019-08-19 PROCEDURE — 99999 PR PBB SHADOW E&M-EST. PATIENT-LVL III: ICD-10-PCS | Mod: PBBFAC,,, | Performed by: FAMILY MEDICINE

## 2019-08-19 PROCEDURE — 99999 PR PBB SHADOW E&M-EST. PATIENT-LVL III: CPT | Mod: PBBFAC,,, | Performed by: FAMILY MEDICINE

## 2019-08-19 PROCEDURE — 99214 OFFICE O/P EST MOD 30 MIN: CPT | Mod: S$GLB,,, | Performed by: FAMILY MEDICINE

## 2019-08-19 NOTE — PROGRESS NOTES
Subjective:       Patient ID: Ascencion Owens     Chief Complaint: Dizziness      HPICharindio Owens is a 63 y.o. male. Presents with acute vertigo x 2 episodes while lying in bed on yesterday which awoke him from sleep.  Also reports slight speech delay at times, trouble finding words over past 2 weeks.    Review of patient's allergies indicates:   Allergen Reactions    Perfume Rash     Allergic to certain cologne fragrance - rash certain area of the body        Current Outpatient Medications:     amLODIPine (NORVASC) 10 MG tablet, Take 1 tablet (10 mg total) by mouth once daily., Disp: 90 tablet, Rfl: 0    aspirin (ECOTRIN) 81 MG EC tablet, Take 81 mg by mouth once daily., Disp: , Rfl:     atorvastatin (LIPITOR) 20 MG tablet, Take 1 tablet (20 mg total) by mouth nightly., Disp: 90 tablet, Rfl: 1    colchicine (COLCRYS) 0.6 mg tablet, Take 2 tablets at onset of pain, then 1 tablet in an hour if pain persist.  Do not take more than 3 pills in a day., Disp: 30 tablet, Rfl: 2    indomethacin (INDOCIN SR) 75 mg CpSR CR capsule, Take 1 capsule (75 mg total) by mouth every 8 (eight) hours as needed., Disp: 30 capsule, Rfl: 2    ketotifen (ZADITOR) 0.025 % (0.035 %) ophthalmic solution, Place 1 drop into the right eye 2 (two) times daily., Disp: 5 mL, Rfl: 0    nystatin (MYCOSTATIN) cream, Apply topically 2 (two) times daily., Disp: 30 g, Rfl: 0    Past Medical History:   Diagnosis Date    Acute bronchitis     Arthritis     CKD (chronic kidney disease)     Cluster headache     Dyslipidemia     Essential hypertension, benign     Hypercholesteremia     Obesity      Review of Systems   HENT: Negative for tinnitus and trouble swallowing.    Eyes: Negative for visual disturbance.   Gastrointestinal: Negative for nausea and vomiting.   Neurological: Positive for dizziness. Negative for weakness, numbness and headaches.   Psychiatric/Behavioral: Negative for decreased concentration.       Objective:       Physical Exam   Constitutional: He appears well-developed and well-nourished.   HENT:   Head: Normocephalic and atraumatic.   Impacted cerumen   Eyes: Pupils are equal, round, and reactive to light.   No nystagmus   Neck: Normal range of motion. Neck supple.   Cardiovascular: Normal rate and regular rhythm.   Pulmonary/Chest: Effort normal and breath sounds normal.   Musculoskeletal: He exhibits no edema or deformity.   Neurological: He is alert. He displays a negative Romberg sign. Coordination and gait normal.   Skin: Skin is warm and dry.   Psychiatric: He has a normal mood and affect.       Assessment:       1. Vertigo        Plan:       Ascencion was seen today for dizziness.    Diagnoses and all orders for this visit:    Vertigo  -     CT Head Without Contrast; Future  BPV exercises.  Recommend meclizine prn

## 2019-08-23 ENCOUNTER — TELEPHONE (OUTPATIENT)
Dept: FAMILY MEDICINE | Facility: CLINIC | Age: 64
End: 2019-08-23

## 2019-08-26 NOTE — TELEPHONE ENCOUNTER
----- Message from Bronwyn Amos sent at 8/26/2019  3:24 PM CDT -----  Contact: self [phone]  .Type:  Patient Returning Call    Who Called:  Self     Who Left Message for Patient: Eboni Gee    Does the patient know what this is regarding?: unknown     Would the patient rather a call back or a response via [facility]?  Call back     Best Call Back Number: [phone]

## 2019-08-27 ENCOUNTER — HOSPITAL ENCOUNTER (OUTPATIENT)
Dept: RADIOLOGY | Facility: HOSPITAL | Age: 64
Discharge: HOME | End: 2019-08-27
Attending: FAMILY MEDICINE
Payer: COMMERCIAL

## 2019-08-27 DIAGNOSIS — R42 VERTIGO: ICD-10-CM

## 2019-08-27 PROCEDURE — 70450 CT HEAD/BRAIN W/O DYE: CPT | Mod: TC

## 2019-08-27 PROCEDURE — 70450 CT HEAD WITHOUT CONTRAST: ICD-10-PCS | Mod: 26,,, | Performed by: RADIOLOGY

## 2019-08-27 PROCEDURE — 70450 CT HEAD/BRAIN W/O DYE: CPT | Mod: 26,,, | Performed by: RADIOLOGY

## 2019-10-02 ENCOUNTER — HOSPITAL ENCOUNTER (EMERGENCY)
Facility: HOSPITAL | Age: 64
Discharge: HOME | End: 2019-10-02
Attending: EMERGENCY MEDICINE
Payer: COMMERCIAL

## 2019-10-02 VITALS
RESPIRATION RATE: 21 BRPM | DIASTOLIC BLOOD PRESSURE: 58 MMHG | OXYGEN SATURATION: 94 % | HEART RATE: 67 BPM | BODY MASS INDEX: 31.1 KG/M2 | WEIGHT: 210 LBS | HEIGHT: 69 IN | SYSTOLIC BLOOD PRESSURE: 125 MMHG | TEMPERATURE: 99 F

## 2019-10-02 DIAGNOSIS — R40.20 LOC (LOSS OF CONSCIOUSNESS): ICD-10-CM

## 2019-10-02 DIAGNOSIS — R55 SYNCOPE: ICD-10-CM

## 2019-10-02 DIAGNOSIS — E86.0 DEHYDRATION: Primary | ICD-10-CM

## 2019-10-02 LAB
ALBUMIN SERPL BCP-MCNC: 3.9 G/DL (ref 3.5–5.2)
ALP SERPL-CCNC: 67 U/L (ref 55–135)
ALT SERPL W/O P-5'-P-CCNC: 15 U/L (ref 10–44)
ANION GAP SERPL CALC-SCNC: 10 MMOL/L (ref 8–16)
AST SERPL-CCNC: 20 U/L (ref 10–40)
BASOPHILS # BLD AUTO: 0.02 K/UL (ref 0–0.2)
BASOPHILS NFR BLD: 0.2 % (ref 0–1.9)
BILIRUB SERPL-MCNC: 0.5 MG/DL (ref 0.1–1)
BILIRUB UR QL STRIP: NEGATIVE
BUN SERPL-MCNC: 12 MG/DL (ref 8–23)
CALCIUM SERPL-MCNC: 8.9 MG/DL (ref 8.7–10.5)
CHLORIDE SERPL-SCNC: 110 MMOL/L (ref 95–110)
CK SERPL-CCNC: 161 U/L (ref 20–200)
CLARITY UR: ABNORMAL
CO2 SERPL-SCNC: 24 MMOL/L (ref 23–29)
COLOR UR: ABNORMAL
CREAT SERPL-MCNC: 1.4 MG/DL (ref 0.5–1.4)
DIFFERENTIAL METHOD: ABNORMAL
EOSINOPHIL # BLD AUTO: 0 K/UL (ref 0–0.5)
EOSINOPHIL NFR BLD: 0.1 % (ref 0–8)
ERYTHROCYTE [DISTWIDTH] IN BLOOD BY AUTOMATED COUNT: 14.7 % (ref 11.5–14.5)
EST. GFR  (AFRICAN AMERICAN): >60 ML/MIN/1.73 M^2
EST. GFR  (NON AFRICAN AMERICAN): 53 ML/MIN/1.73 M^2
GLUCOSE SERPL-MCNC: 85 MG/DL (ref 70–110)
GLUCOSE UR QL STRIP: NEGATIVE
HCT VFR BLD AUTO: 42.1 % (ref 40–54)
HGB BLD-MCNC: 14.1 G/DL (ref 14–18)
HGB UR QL STRIP: NEGATIVE
KETONES UR QL STRIP: NEGATIVE
LEUKOCYTE ESTERASE UR QL STRIP: NEGATIVE
LYMPHOCYTES # BLD AUTO: 1 K/UL (ref 1–4.8)
LYMPHOCYTES NFR BLD: 9.1 % (ref 18–48)
MCH RBC QN AUTO: 26.9 PG (ref 27–31)
MCHC RBC AUTO-ENTMCNC: 33.5 G/DL (ref 32–36)
MCV RBC AUTO: 80 FL (ref 82–98)
MONOCYTES # BLD AUTO: 0.9 K/UL (ref 0.3–1)
MONOCYTES NFR BLD: 7.6 % (ref 4–15)
NEUTROPHILS # BLD AUTO: 9.3 K/UL (ref 1.8–7.7)
NEUTROPHILS NFR BLD: 83 % (ref 38–73)
NITRITE UR QL STRIP: NEGATIVE
PH UR STRIP: 5 [PH] (ref 5–8)
PLATELET # BLD AUTO: 190 K/UL (ref 150–350)
PMV BLD AUTO: 11.1 FL (ref 9.2–12.9)
POTASSIUM SERPL-SCNC: 3.4 MMOL/L (ref 3.5–5.1)
PROT SERPL-MCNC: 7 G/DL (ref 6–8.4)
PROT UR QL STRIP: NEGATIVE
RBC # BLD AUTO: 5.25 M/UL (ref 4.6–6.2)
SODIUM SERPL-SCNC: 144 MMOL/L (ref 136–145)
SP GR UR STRIP: 1.01 (ref 1–1.03)
URN SPEC COLLECT METH UR: ABNORMAL
UROBILINOGEN UR STRIP-ACNC: ABNORMAL EU/DL
WBC # BLD AUTO: 11.23 K/UL (ref 3.9–12.7)

## 2019-10-02 PROCEDURE — 63600175 PHARM REV CODE 636 W HCPCS: Performed by: EMERGENCY MEDICINE

## 2019-10-02 PROCEDURE — 93010 EKG 12-LEAD: ICD-10-PCS | Mod: ,,, | Performed by: INTERNAL MEDICINE

## 2019-10-02 PROCEDURE — 82550 ASSAY OF CK (CPK): CPT

## 2019-10-02 PROCEDURE — 93010 ELECTROCARDIOGRAM REPORT: CPT | Mod: ,,, | Performed by: INTERNAL MEDICINE

## 2019-10-02 PROCEDURE — 81003 URINALYSIS AUTO W/O SCOPE: CPT

## 2019-10-02 PROCEDURE — 93005 ELECTROCARDIOGRAM TRACING: CPT

## 2019-10-02 PROCEDURE — 80053 COMPREHEN METABOLIC PANEL: CPT

## 2019-10-02 PROCEDURE — 99285 EMERGENCY DEPT VISIT HI MDM: CPT | Mod: 25

## 2019-10-02 PROCEDURE — 96361 HYDRATE IV INFUSION ADD-ON: CPT

## 2019-10-02 PROCEDURE — 90715 TDAP VACCINE 7 YRS/> IM: CPT | Performed by: EMERGENCY MEDICINE

## 2019-10-02 PROCEDURE — 90471 IMMUNIZATION ADMIN: CPT | Performed by: EMERGENCY MEDICINE

## 2019-10-02 PROCEDURE — 94761 N-INVAS EAR/PLS OXIMETRY MLT: CPT

## 2019-10-02 PROCEDURE — 85025 COMPLETE CBC W/AUTO DIFF WBC: CPT

## 2019-10-02 PROCEDURE — 25000003 PHARM REV CODE 250: Performed by: EMERGENCY MEDICINE

## 2019-10-02 PROCEDURE — 96360 HYDRATION IV INFUSION INIT: CPT

## 2019-10-02 RX ADMIN — BACITRACIN, NEOMYCIN, POLYMYXIN B 1 EACH: 400; 3.5; 5 OINTMENT TOPICAL at 06:10

## 2019-10-02 RX ADMIN — SODIUM CHLORIDE 1000 ML: 9 INJECTION, SOLUTION INTRAVENOUS at 05:10

## 2019-10-02 RX ADMIN — CLOSTRIDIUM TETANI TOXOID ANTIGEN (FORMALDEHYDE INACTIVATED), CORYNEBACTERIUM DIPHTHERIAE TOXOID ANTIGEN (FORMALDEHYDE INACTIVATED), BORDETELLA PERTUSSIS TOXOID ANTIGEN (GLUTARALDEHYDE INACTIVATED), BORDETELLA PERTUSSIS FILAMENTOUS HEMAGGLUTININ ANTIGEN (FORMALDEHYDE INACTIVATED), BORDETELLA PERTUSSIS PERTACTIN ANTIGEN, AND BORDETELLA PERTUSSIS FIMBRIAE 2/3 ANTIGEN 0.5 ML: 5; 2; 2.5; 5; 3; 5 INJECTION, SUSPENSION INTRAMUSCULAR at 06:10

## 2019-10-02 NOTE — ED PROVIDER NOTES
Encounter Date: 10/2/2019    SCRIBE #1 NOTE: I, Janes Chirinos, am scribing for, and in the presence of,  Yolande Nava MD. I have scribed the following portions of the note - Other sections scribed: HPI, REMY.       History     Chief Complaint   Patient presents with    Loss of Consciousness     EMS reports pt was out in the yard today for 5 hrs and had a syncopal episode. pt hypotensive upon EMS arrival. 1L NS given en route, pt reports feeling better     Time seen by provider: 4:50 PM    This is a 63 y.o. male with past medical history of essential hypertension, hyperlipidemia, chronic kidney disease, who presents to the ED for evaluation s/p syncopal episode earlier today. Patient states he was outside doing yard work, had just pull-started the leaf blower and was walking with the leaf blower when he lost consciousness and to the ground, while in the driveway. He states that initially having generalized weakness upon waking up, although this has completely resolved. Patient thinks that he may have struck the left side of his face against the cement, and he is complaining of very mild headache. Patient was doing his usual yard work for himself and his neighbor, which he has been doing weekly for the past 3 months. He does not recall any prodromal chest pain, shortness of breath, palpitations, diaphoresis, nausea. He states waking up this morning feeling fine and denies vomiting, diarrhea, darker stools, fever, cough. He did not bite his tongue or lose bowel or bladder continence. Patient reports compliance with all of his home medications. He denies any recent medication changes. Patient is taking Aspirin but denies any other blood thinners. He has not had his tetanus updated within the last 5 years.     The history is provided by the patient.     Review of patient's allergies indicates:   Allergen Reactions    Perfume Rash     Allergic to certain cologne fragrance - rash certain area of the body      Past Medical  History:   Diagnosis Date    Acute bronchitis     Arthritis     CKD (chronic kidney disease)     Cluster headache     Dyslipidemia     Essential hypertension, benign     Hypercholesteremia     Obesity      Past Surgical History:   Procedure Laterality Date    APPENDECTOMY      COLONOSCOPY  2004    lt ankle surgery  1973     Family History   Problem Relation Age of Onset    Diabetes Mother     Hypertension Mother     Heart disease Mother      Social History     Tobacco Use    Smoking status: Former Smoker     Types: Cigars    Smokeless tobacco: Never Used   Substance Use Topics    Alcohol use: Yes     Comment: occasionally    Drug use: No     Review of Systems   Constitutional: Negative for chills and fever.   HENT: Negative for congestion and sore throat.    Eyes: Negative for visual disturbance.   Respiratory: Negative for cough and shortness of breath.    Cardiovascular: Negative for chest pain.   Gastrointestinal: Negative for abdominal pain, nausea and vomiting.   Genitourinary: Negative for dysuria.   Skin: Negative for rash.   Allergic/Immunologic: Negative for immunocompromised state.   Neurological: Positive for syncope and headaches.       Physical Exam     Initial Vitals [10/02/19 1629]   BP Pulse Resp Temp SpO2   (!) 122/59 80 16 98.1 °F (36.7 °C) 95 %      MAP       --         Physical Exam    Constitutional: He appears well-developed and well-nourished. He is not diaphoretic. No distress.   HENT:   Head: Head is without raccoon's eyes, without Coleman's sign, without contusion, without laceration, without right periorbital erythema and without left periorbital erythema.       Nose: No nasal deformity. No epistaxis.   Mouth/Throat: Oropharynx is clear and moist.   Eyes: EOM are normal. Pupils are equal, round, and reactive to light.   Neck: Neck supple. No spinous process tenderness and no muscular tenderness present.   Cardiovascular: Normal rate and regular rhythm.   Pulmonary/Chest:  Breath sounds normal. No respiratory distress.   Abdominal: Soft. Bowel sounds are normal.   Musculoskeletal: He exhibits no edema.   Neurological: He is alert and oriented to person, place, and time. He has normal strength. No cranial nerve deficit or sensory deficit. GCS score is 15. GCS eye subscore is 4. GCS verbal subscore is 5. GCS motor subscore is 6.   Skin: Skin is warm and dry.   Psychiatric: He has a normal mood and affect.         ED Course   Procedures  Labs Reviewed   CBC W/ AUTO DIFFERENTIAL - Abnormal; Notable for the following components:       Result Value    Mean Corpuscular Volume 80 (*)     Mean Corpuscular Hemoglobin 26.9 (*)     RDW 14.7 (*)     Gran # (ANC) 9.3 (*)     Gran% 83.0 (*)     Lymph% 9.1 (*)     All other components within normal limits   COMPREHENSIVE METABOLIC PANEL - Abnormal; Notable for the following components:    Potassium 3.4 (*)     eGFR if non  53 (*)     All other components within normal limits   URINALYSIS, REFLEX TO URINE CULTURE - Abnormal; Notable for the following components:    Appearance, UA Hazy (*)     Urobilinogen, UA 2.0-3.0 (*)     All other components within normal limits    Narrative:     Preferred Collection Type->Urine, Clean Catch   CK        ECG Results          EKG 12-lead (Preliminary result)  Result time 10/02/19 18:10:20    ED Interpretation by Yolande Nava MD (10/02/19 18:10:20)    Normal sinus rhythm, rate 77 beats per minute, no STEMI.  Normal CO interval.  .                            Imaging Results          X-Ray Chest AP Portable (Final result)  Result time 10/02/19 16:58:34    Final result by Kishan Hines MD (10/02/19 16:58:34)                 Impression:      No acute abnormality.      Electronically signed by: Kishan Hines MD  Date:    10/02/2019  Time:    16:58             Narrative:    EXAMINATION:  XR CHEST AP PORTABLE    CLINICAL HISTORY:  syncope;    TECHNIQUE:  Single frontal view of the chest was  performed.    COMPARISON:  April 17, 2011    FINDINGS:  Lungs are clear.  No effusion or pneumothorax.    No acute bone abnormality.                                 Medical Decision Making:   Initial Assessment:   63-year-old male with history of hypertension presents after syncopal episode, this occurred after exerting himself for several hours doing yd work.  There is no prodromal chest pain, palpitations or shortness of breath, no confusion after syncopal event, no loss of bowel or bladder continence or tongue biting.  Currently reports very mild headache but denies other complaints and feels improved.  Exam notable for a very small superficial abrasion to his left cheek but otherwise nonfocal. I suspect dehydration, orthostatic hypotension.  I have a very low suspicion for dysrhythmia, symptomatic anemia, seizure.  I carefully considered but doubt PE is patient denies any chest pain or shortness of breath and there is no tachycardia.  I plan to check basic labs on this patient, will add a CPK, will check orthostatics and treat with IV fluids.  ED Management:  On reassessment, patient denies any complaints. He states he is ready to go.  I have reviewed his lab and imaging findings with him.  I recommend p.o. hydration, and follow up with his primary care physician.  I reviewed return precautions with the patient.  He states he understands and agrees with plan.            Scribe Attestation:   Scribe #1: I performed the above scribed service and the documentation accurately describes the services I performed. I attest to the accuracy of the note.               Clinical Impression:       ICD-10-CM ICD-9-CM   1. Dehydration E86.0 276.51   2. Syncope R55 780.2   3. LOC (loss of consciousness) R40.20 780.09                            I, Yolande Nava MD, personally performed the services described in this documentation. All medical record entries made by the scribe were at my direction and in my presence.  I have  reviewed the chart and agree that the record reflects my personal performance and is accurate and complete.       Yolande Nava MD  10/02/19 2129

## 2019-10-02 NOTE — ED TRIAGE NOTES
Pt arrives to er via ems on stretcher from home pt aaox4 no distress. Pt and family states while performing 5 hours of  yard work, he became dizzy and weak.     pt hypotensive upon EMS arrival. 1L NS given en route, pt reports feeling better. Reports fatigue Pt denies sob, chest pain, numbness,tingling

## 2019-11-01 DIAGNOSIS — I10 ESSENTIAL HYPERTENSION: ICD-10-CM

## 2019-11-01 RX ORDER — AMLODIPINE BESYLATE 10 MG/1
TABLET ORAL
Qty: 90 TABLET | Refills: 0 | Status: SHIPPED | OUTPATIENT
Start: 2019-11-01 | End: 2020-01-10 | Stop reason: SDUPTHER

## 2020-01-10 ENCOUNTER — LAB VISIT (OUTPATIENT)
Dept: LAB | Facility: HOSPITAL | Age: 65
End: 2020-01-10
Attending: FAMILY MEDICINE
Payer: COMMERCIAL

## 2020-01-10 ENCOUNTER — OFFICE VISIT (OUTPATIENT)
Dept: FAMILY MEDICINE | Facility: CLINIC | Age: 65
End: 2020-01-10
Payer: COMMERCIAL

## 2020-01-10 VITALS
HEIGHT: 69 IN | SYSTOLIC BLOOD PRESSURE: 128 MMHG | OXYGEN SATURATION: 96 % | BODY MASS INDEX: 32.13 KG/M2 | WEIGHT: 216.94 LBS | RESPIRATION RATE: 16 BRPM | HEART RATE: 62 BPM | TEMPERATURE: 98 F | DIASTOLIC BLOOD PRESSURE: 80 MMHG

## 2020-01-10 DIAGNOSIS — E78.5 HYPERLIPIDEMIA, ACQUIRED: ICD-10-CM

## 2020-01-10 DIAGNOSIS — I10 ESSENTIAL HYPERTENSION: ICD-10-CM

## 2020-01-10 DIAGNOSIS — Z00.00 WELL ADULT EXAM: Primary | ICD-10-CM

## 2020-01-10 DIAGNOSIS — Z00.00 WELL ADULT EXAM: ICD-10-CM

## 2020-01-10 LAB
25(OH)D3+25(OH)D2 SERPL-MCNC: 18 NG/ML (ref 30–96)
ALBUMIN SERPL BCP-MCNC: 3.9 G/DL (ref 3.5–5.2)
ALP SERPL-CCNC: 75 U/L (ref 55–135)
ALT SERPL W/O P-5'-P-CCNC: 18 U/L (ref 10–44)
ANION GAP SERPL CALC-SCNC: 8 MMOL/L (ref 8–16)
AST SERPL-CCNC: 23 U/L (ref 10–40)
BASOPHILS # BLD AUTO: 0.05 K/UL (ref 0–0.2)
BASOPHILS NFR BLD: 0.9 % (ref 0–1.9)
BILIRUB SERPL-MCNC: 0.7 MG/DL (ref 0.1–1)
BUN SERPL-MCNC: 12 MG/DL (ref 8–23)
CALCIUM SERPL-MCNC: 9.5 MG/DL (ref 8.7–10.5)
CHLORIDE SERPL-SCNC: 105 MMOL/L (ref 95–110)
CHOLEST SERPL-MCNC: 161 MG/DL (ref 120–199)
CHOLEST/HDLC SERPL: 3.3 {RATIO} (ref 2–5)
CO2 SERPL-SCNC: 30 MMOL/L (ref 23–29)
COMPLEXED PSA SERPL-MCNC: 0.46 NG/ML (ref 0–4)
CREAT SERPL-MCNC: 1.2 MG/DL (ref 0.5–1.4)
DIFFERENTIAL METHOD: ABNORMAL
EOSINOPHIL # BLD AUTO: 0.2 K/UL (ref 0–0.5)
EOSINOPHIL NFR BLD: 3.1 % (ref 0–8)
ERYTHROCYTE [DISTWIDTH] IN BLOOD BY AUTOMATED COUNT: 14.2 % (ref 11.5–14.5)
EST. GFR  (AFRICAN AMERICAN): >60 ML/MIN/1.73 M^2
EST. GFR  (NON AFRICAN AMERICAN): >60 ML/MIN/1.73 M^2
ESTIMATED AVG GLUCOSE: 117 MG/DL (ref 68–131)
GLUCOSE SERPL-MCNC: 96 MG/DL (ref 70–110)
HBA1C MFR BLD HPLC: 5.7 % (ref 4–5.6)
HCT VFR BLD AUTO: 48.6 % (ref 40–54)
HDLC SERPL-MCNC: 49 MG/DL (ref 40–75)
HDLC SERPL: 30.4 % (ref 20–50)
HGB BLD-MCNC: 15.3 G/DL (ref 14–18)
IMM GRANULOCYTES # BLD AUTO: 0.01 K/UL (ref 0–0.04)
IMM GRANULOCYTES NFR BLD AUTO: 0.2 % (ref 0–0.5)
LDLC SERPL CALC-MCNC: 103.8 MG/DL (ref 63–159)
LYMPHOCYTES # BLD AUTO: 1.3 K/UL (ref 1–4.8)
LYMPHOCYTES NFR BLD: 22.5 % (ref 18–48)
MCH RBC QN AUTO: 27.3 PG (ref 27–31)
MCHC RBC AUTO-ENTMCNC: 31.5 G/DL (ref 32–36)
MCV RBC AUTO: 87 FL (ref 82–98)
MONOCYTES # BLD AUTO: 0.7 K/UL (ref 0.3–1)
MONOCYTES NFR BLD: 11.9 % (ref 4–15)
NEUTROPHILS # BLD AUTO: 3.5 K/UL (ref 1.8–7.7)
NEUTROPHILS NFR BLD: 61.4 % (ref 38–73)
NONHDLC SERPL-MCNC: 112 MG/DL
NRBC BLD-RTO: 0 /100 WBC
PLATELET # BLD AUTO: 182 K/UL (ref 150–350)
PMV BLD AUTO: 12 FL (ref 9.2–12.9)
POTASSIUM SERPL-SCNC: 4.2 MMOL/L (ref 3.5–5.1)
PROT SERPL-MCNC: 7.3 G/DL (ref 6–8.4)
RBC # BLD AUTO: 5.61 M/UL (ref 4.6–6.2)
SODIUM SERPL-SCNC: 143 MMOL/L (ref 136–145)
T4 FREE SERPL-MCNC: 0.87 NG/DL (ref 0.71–1.51)
TRIGL SERPL-MCNC: 41 MG/DL (ref 30–150)
TSH SERPL DL<=0.005 MIU/L-ACNC: 1.99 UIU/ML (ref 0.4–4)
WBC # BLD AUTO: 5.73 K/UL (ref 3.9–12.7)

## 2020-01-10 PROCEDURE — 83036 HEMOGLOBIN GLYCOSYLATED A1C: CPT

## 2020-01-10 PROCEDURE — 82306 VITAMIN D 25 HYDROXY: CPT

## 2020-01-10 PROCEDURE — 99396 PR PREVENTIVE VISIT,EST,40-64: ICD-10-PCS | Mod: S$GLB,,, | Performed by: FAMILY MEDICINE

## 2020-01-10 PROCEDURE — 84439 ASSAY OF FREE THYROXINE: CPT

## 2020-01-10 PROCEDURE — 99999 PR PBB SHADOW E&M-EST. PATIENT-LVL III: CPT | Mod: PBBFAC,,, | Performed by: FAMILY MEDICINE

## 2020-01-10 PROCEDURE — 99396 PREV VISIT EST AGE 40-64: CPT | Mod: S$GLB,,, | Performed by: FAMILY MEDICINE

## 2020-01-10 PROCEDURE — 36415 COLL VENOUS BLD VENIPUNCTURE: CPT | Mod: PO

## 2020-01-10 PROCEDURE — 80053 COMPREHEN METABOLIC PANEL: CPT

## 2020-01-10 PROCEDURE — 99999 PR PBB SHADOW E&M-EST. PATIENT-LVL III: ICD-10-PCS | Mod: PBBFAC,,, | Performed by: FAMILY MEDICINE

## 2020-01-10 PROCEDURE — 85025 COMPLETE CBC W/AUTO DIFF WBC: CPT

## 2020-01-10 PROCEDURE — 84153 ASSAY OF PSA TOTAL: CPT

## 2020-01-10 PROCEDURE — 80061 LIPID PANEL: CPT

## 2020-01-10 PROCEDURE — 84443 ASSAY THYROID STIM HORMONE: CPT

## 2020-01-10 RX ORDER — ATORVASTATIN CALCIUM 20 MG/1
20 TABLET, FILM COATED ORAL NIGHTLY
Qty: 90 TABLET | Refills: 1 | Status: SHIPPED | OUTPATIENT
Start: 2020-01-10 | End: 2020-09-09

## 2020-01-10 RX ORDER — AMLODIPINE BESYLATE 10 MG/1
10 TABLET ORAL DAILY
Qty: 90 TABLET | Refills: 1 | Status: SHIPPED | OUTPATIENT
Start: 2020-01-10 | End: 2020-04-27

## 2020-01-10 NOTE — PROGRESS NOTES
Chief Complaint   Patient presents with    Annual Exam       Ascencion Owens is a 64 y.o. male who presents per the Chief Complaint.  Pt is known to me and was last seen by me on 2/12/2019.  All known chronic medical issues have been documented.       Hypertension   This is a chronic problem. The current episode started more than 1 year ago. The problem is unchanged. The problem is controlled. Pertinent negatives include no anxiety, blurred vision, chest pain, headaches (history of cluster headache), malaise/fatigue, neck pain, orthopnea, palpitations, peripheral edema, PND, shortness of breath or sweats. There are no associated agents to hypertension. Risk factors for coronary artery disease include male gender, obesity and dyslipidemia. Past treatments include ACE inhibitors, diuretics and calcium channel blockers. The current treatment provides moderate improvement. There are no compliance problems.  There is no history of angina, kidney disease, CAD/MI, CVA, heart failure, left ventricular hypertrophy, PVD or retinopathy. There is no history of chronic renal disease, coarctation of the aorta, hyperaldosteronism, hyperparathyroidism, a hypertension causing med, renovascular disease, sleep apnea or a thyroid problem.        ROS  Review of Systems   Constitutional: Negative for activity change, appetite change, chills, fatigue, fever, malaise/fatigue and unexpected weight change.   HENT: Negative for congestion, ear pain, hearing loss, postnasal drip, rhinorrhea, sinus pressure, sore throat and trouble swallowing.    Eyes: Negative for blurred vision, pain, discharge and visual disturbance.   Respiratory: Negative for cough, chest tightness, shortness of breath and wheezing.    Cardiovascular: Negative for chest pain, palpitations, orthopnea and PND.   Gastrointestinal: Negative for abdominal pain, blood in stool, constipation, diarrhea, nausea and vomiting.   Endocrine: Negative for polydipsia and polyuria.  "  Genitourinary: Negative for difficulty urinating, dysuria, flank pain, frequency, hematuria, penile pain, penile swelling, scrotal swelling, testicular pain and urgency.   Musculoskeletal: Negative.  Negative for arthralgias, back pain, joint swelling, myalgias, neck pain and neck stiffness.   Skin: Negative.    Allergic/Immunologic: Negative for environmental allergies, food allergies and immunocompromised state.   Neurological: Negative for dizziness, weakness, light-headedness and headaches (history of cluster headache).   Psychiatric/Behavioral: Negative.  Negative for confusion and dysphoric mood.       Physical Exam  Vitals:    01/10/20 0849   BP: 128/80   Pulse: 62   Resp: 16   Temp: 98.3 °F (36.8 °C)    Body mass index is 32.04 kg/m².  Weight: 98.4 kg (216 lb 14.9 oz)   Height: 5' 9" (175.3 cm)     Physical Exam   Constitutional: He is oriented to person, place, and time. He appears well-developed and well-nourished. He is active and cooperative.  Non-toxic appearance. He does not have a sickly appearance. He does not appear ill. No distress.   HENT:   Head: Normocephalic and atraumatic.   Right Ear: Hearing, tympanic membrane, external ear and ear canal normal. No tenderness. No foreign bodies. No mastoid tenderness. Tympanic membrane is not injected, not scarred, not perforated, not erythematous, not retracted and not bulging. No hemotympanum. No decreased hearing is noted.   Left Ear: Hearing, tympanic membrane, external ear and ear canal normal. No tenderness. No foreign bodies. No mastoid tenderness. Tympanic membrane is not injected, not scarred, not perforated, not erythematous, not retracted and not bulging. No hemotympanum. No decreased hearing is noted.   Nose: Nose normal. No sinus tenderness, nasal deformity or septal deviation. No epistaxis.  No foreign bodies.   Mouth/Throat: Uvula is midline, oropharynx is clear and moist and mucous membranes are normal. He does not have dentures. Normal " dentition. No uvula swelling or dental caries. No oropharyngeal exudate, posterior oropharyngeal edema, posterior oropharyngeal erythema or tonsillar abscesses.   Eyes: Pupils are equal, round, and reactive to light. Conjunctivae, EOM and lids are normal. Right eye exhibits no chemosis, no discharge, no exudate and no hordeolum. No foreign body present in the right eye. Left eye exhibits no chemosis, no discharge, no exudate and no hordeolum. No foreign body present in the left eye. No scleral icterus.   Neck: Normal range of motion and full passive range of motion without pain. Neck supple. No thyroid mass and no thyromegaly present.   Cardiovascular: Normal rate, regular rhythm, S1 normal, S2 normal and normal heart sounds. Exam reveals no gallop and no friction rub.   No murmur heard.  Pulmonary/Chest: Effort normal and breath sounds normal. He has no decreased breath sounds. He has no wheezes. He has no rhonchi. He has no rales.   Abdominal: Soft. Normal appearance and bowel sounds are normal. He exhibits no distension, no ascites and no mass. There is no hepatosplenomegaly. There is no tenderness. There is no rigidity, no rebound and no guarding. No hernia.   Musculoskeletal: Normal range of motion.   Lymphadenopathy:        Head (right side): No submental, no submandibular, no preauricular and no posterior auricular adenopathy present.        Head (left side): No submental, no submandibular, no preauricular and no posterior auricular adenopathy present.     He has no cervical adenopathy.   Neurological: He is alert and oriented to person, place, and time. He has normal strength. He displays no atrophy and no tremor. No cranial nerve deficit or sensory deficit. He exhibits normal muscle tone. He displays no seizure activity.   Skin: Skin is warm, dry and intact. No rash noted. He is not diaphoretic. No pallor.   Psychiatric: He has a normal mood and affect. His speech is normal and behavior is normal. Judgment  and thought content normal. Cognition and memory are normal. He is attentive.   Vitals reviewed.      Assessment & Plan    Discussion of plan of care including treatment options regarding health and wellness were reviewed and discussed with patient.  Any changes to medication or treatment plan, as well as any screening blood test, imaging, or referrals to specialist, are documented.  Follow up as indicated.     1. Well adult exam  Discussed age and gender appropriate screenings at this visit and encouraged a healthy diet with low saturated fats, and increased physical activity.  Counseled on medically appropriate vaccines based on age and current health condition.  Screening test will be ordered and once completed, patient will be notified of results when available.  If necessary, will follow up to discuss and manage further.   - CBC auto differential; Future  - Comprehensive metabolic panel; Future  - Vitamin D; Future  - TSH; Future  - T4, free; Future  - Hemoglobin A1c; Future  - Lipid panel; Future  - PSA, Screening; Future    2. Essential hypertension  Patient was counseled and encouraged to maintain a low sodium diet, as well as increasing physical activity.  Recommend random BP checks at home on a regular basis.  Repeat BP at end of visit was not necessary. Will continue medication at this time, and follow up in 3-6 months, or sooner if blood pressure begins to increase.     - amLODIPine (NORVASC) 10 MG tablet; Take 1 tablet (10 mg total) by mouth once daily.  Dispense: 90 tablet; Refill: 1    3. Hyperlipidemia, acquired  We discussed ways to manage cholesterol levels, including increasing whole grain foods and decreasing fried and fatty foods.  I also recommended OTC Omega 3 and Omega 6 supplements to improve overall cholesterol levels.  Will continue current therapy at this visit; patient is due for screening blood test at this time.   - atorvastatin (LIPITOR) 20 MG tablet; Take 1 tablet (20 mg total) by  mouth nightly.  Dispense: 90 tablet; Refill: 1       Follow up in about 6 months (around 7/10/2020), or if symptoms worsen or fail to improve.        ACTIVE MEDICAL ISSUES:  Documented in Problem List    PAST MEDICAL HISTORY  Documented    PAST SURGICAL HISTORY:  Documented    SOCIAL HISTORY:  Documented    FAMILY HISTORY:  Documented    ALLERGIES AND MEDICATIONS: updated and reviewed.  Documented    Health Maintenance       Date Due Completion Date    Lipid Panel 02/12/2024 2/12/2019    Colonoscopy 06/10/2025 6/10/2015    TETANUS VACCINE 10/02/2029 10/2/2019

## 2020-03-30 ENCOUNTER — PATIENT MESSAGE (OUTPATIENT)
Dept: FAMILY MEDICINE | Facility: CLINIC | Age: 65
End: 2020-03-30

## 2020-03-30 RX ORDER — MELOXICAM 15 MG/1
15 TABLET ORAL DAILY
Qty: 90 TABLET | Refills: 0 | Status: SHIPPED | OUTPATIENT
Start: 2020-03-30 | End: 2020-06-21

## 2020-04-26 DIAGNOSIS — I10 ESSENTIAL HYPERTENSION: ICD-10-CM

## 2020-04-27 RX ORDER — AMLODIPINE BESYLATE 10 MG/1
TABLET ORAL
Qty: 90 TABLET | Refills: 0 | Status: SHIPPED | OUTPATIENT
Start: 2020-04-27 | End: 2020-07-28

## 2020-09-09 DIAGNOSIS — R73.03 BORDERLINE DIABETES: ICD-10-CM

## 2020-09-09 DIAGNOSIS — I10 ESSENTIAL HYPERTENSION: ICD-10-CM

## 2020-09-09 DIAGNOSIS — E78.5 HYPERLIPIDEMIA, ACQUIRED: Primary | ICD-10-CM

## 2020-09-09 DIAGNOSIS — E55.9 VITAMIN D INSUFFICIENCY: ICD-10-CM

## 2020-09-09 RX ORDER — ATORVASTATIN CALCIUM 20 MG/1
TABLET, FILM COATED ORAL
Qty: 90 TABLET | Refills: 0 | Status: SHIPPED | OUTPATIENT
Start: 2020-09-09 | End: 2020-11-09 | Stop reason: SDUPTHER

## 2020-09-09 NOTE — TELEPHONE ENCOUNTER
Notified patient of information below. Patient scheduled. Pt requesting labs prior to visit. Please advise of orders before scheduling.

## 2020-10-22 ENCOUNTER — LAB VISIT (OUTPATIENT)
Dept: LAB | Facility: HOSPITAL | Age: 65
End: 2020-10-22
Attending: FAMILY MEDICINE
Payer: MEDICARE

## 2020-10-22 DIAGNOSIS — R73.03 BORDERLINE DIABETES: ICD-10-CM

## 2020-10-22 DIAGNOSIS — E78.5 HYPERLIPIDEMIA, ACQUIRED: ICD-10-CM

## 2020-10-22 DIAGNOSIS — I10 ESSENTIAL HYPERTENSION: ICD-10-CM

## 2020-10-22 DIAGNOSIS — E55.9 VITAMIN D INSUFFICIENCY: ICD-10-CM

## 2020-10-22 LAB
25(OH)D3+25(OH)D2 SERPL-MCNC: 37 NG/ML (ref 30–96)
ANION GAP SERPL CALC-SCNC: 7 MMOL/L (ref 8–16)
BUN SERPL-MCNC: 14 MG/DL (ref 8–23)
CALCIUM SERPL-MCNC: 9.4 MG/DL (ref 8.7–10.5)
CHLORIDE SERPL-SCNC: 105 MMOL/L (ref 95–110)
CHOLEST SERPL-MCNC: 146 MG/DL (ref 120–199)
CHOLEST/HDLC SERPL: 2.8 {RATIO} (ref 2–5)
CO2 SERPL-SCNC: 30 MMOL/L (ref 23–29)
CREAT SERPL-MCNC: 1.2 MG/DL (ref 0.5–1.4)
EST. GFR  (AFRICAN AMERICAN): >60 ML/MIN/1.73 M^2
EST. GFR  (NON AFRICAN AMERICAN): >60 ML/MIN/1.73 M^2
ESTIMATED AVG GLUCOSE: 117 MG/DL (ref 68–131)
GLUCOSE SERPL-MCNC: 96 MG/DL (ref 70–110)
HBA1C MFR BLD HPLC: 5.7 % (ref 4–5.6)
HDLC SERPL-MCNC: 52 MG/DL (ref 40–75)
HDLC SERPL: 35.6 % (ref 20–50)
LDLC SERPL CALC-MCNC: 82.4 MG/DL (ref 63–159)
NONHDLC SERPL-MCNC: 94 MG/DL
POTASSIUM SERPL-SCNC: 3.8 MMOL/L (ref 3.5–5.1)
SODIUM SERPL-SCNC: 142 MMOL/L (ref 136–145)
TRIGL SERPL-MCNC: 58 MG/DL (ref 30–150)

## 2020-10-22 PROCEDURE — 82306 VITAMIN D 25 HYDROXY: CPT | Mod: HCNC

## 2020-10-22 PROCEDURE — 36415 COLL VENOUS BLD VENIPUNCTURE: CPT | Mod: HCNC,PO

## 2020-10-22 PROCEDURE — 83036 HEMOGLOBIN GLYCOSYLATED A1C: CPT | Mod: HCNC

## 2020-10-22 PROCEDURE — 80061 LIPID PANEL: CPT | Mod: HCNC

## 2020-10-22 PROCEDURE — 80048 BASIC METABOLIC PNL TOTAL CA: CPT | Mod: HCNC

## 2020-11-09 ENCOUNTER — OFFICE VISIT (OUTPATIENT)
Dept: FAMILY MEDICINE | Facility: CLINIC | Age: 65
End: 2020-11-09
Payer: MEDICARE

## 2020-11-09 VITALS
HEART RATE: 69 BPM | RESPIRATION RATE: 18 BRPM | OXYGEN SATURATION: 97 % | WEIGHT: 216.06 LBS | SYSTOLIC BLOOD PRESSURE: 124 MMHG | BODY MASS INDEX: 32 KG/M2 | TEMPERATURE: 98 F | HEIGHT: 69 IN | DIASTOLIC BLOOD PRESSURE: 76 MMHG

## 2020-11-09 DIAGNOSIS — Z86.39 PERSONAL HISTORY OF OTHER ENDOCRINE, NUTRITIONAL AND METABOLIC DISEASE: ICD-10-CM

## 2020-11-09 DIAGNOSIS — Z12.5 SPECIAL SCREENING FOR MALIGNANT NEOPLASM OF PROSTATE: ICD-10-CM

## 2020-11-09 DIAGNOSIS — E78.5 HYPERLIPIDEMIA, ACQUIRED: ICD-10-CM

## 2020-11-09 DIAGNOSIS — I10 ESSENTIAL HYPERTENSION: Primary | ICD-10-CM

## 2020-11-09 DIAGNOSIS — E55.9 VITAMIN D INSUFFICIENCY: ICD-10-CM

## 2020-11-09 PROCEDURE — 99999 PR PBB SHADOW E&M-EST. PATIENT-LVL III: CPT | Mod: PBBFAC,HCNC,, | Performed by: FAMILY MEDICINE

## 2020-11-09 PROCEDURE — 3074F PR MOST RECENT SYSTOLIC BLOOD PRESSURE < 130 MM HG: ICD-10-PCS | Mod: HCNC,CPTII,S$GLB, | Performed by: FAMILY MEDICINE

## 2020-11-09 PROCEDURE — 3074F SYST BP LT 130 MM HG: CPT | Mod: HCNC,CPTII,S$GLB, | Performed by: FAMILY MEDICINE

## 2020-11-09 PROCEDURE — 3078F PR MOST RECENT DIASTOLIC BLOOD PRESSURE < 80 MM HG: ICD-10-PCS | Mod: HCNC,CPTII,S$GLB, | Performed by: FAMILY MEDICINE

## 2020-11-09 PROCEDURE — 3078F DIAST BP <80 MM HG: CPT | Mod: HCNC,CPTII,S$GLB, | Performed by: FAMILY MEDICINE

## 2020-11-09 PROCEDURE — 1101F PR PT FALLS ASSESS DOC 0-1 FALLS W/OUT INJ PAST YR: ICD-10-PCS | Mod: HCNC,CPTII,S$GLB, | Performed by: FAMILY MEDICINE

## 2020-11-09 PROCEDURE — 3008F BODY MASS INDEX DOCD: CPT | Mod: HCNC,CPTII,S$GLB, | Performed by: FAMILY MEDICINE

## 2020-11-09 PROCEDURE — 99214 PR OFFICE/OUTPT VISIT, EST, LEVL IV, 30-39 MIN: ICD-10-PCS | Mod: HCNC,S$GLB,, | Performed by: FAMILY MEDICINE

## 2020-11-09 PROCEDURE — 99999 PR PBB SHADOW E&M-EST. PATIENT-LVL III: ICD-10-PCS | Mod: PBBFAC,HCNC,, | Performed by: FAMILY MEDICINE

## 2020-11-09 PROCEDURE — 99214 OFFICE O/P EST MOD 30 MIN: CPT | Mod: HCNC,S$GLB,, | Performed by: FAMILY MEDICINE

## 2020-11-09 PROCEDURE — 3008F PR BODY MASS INDEX (BMI) DOCUMENTED: ICD-10-PCS | Mod: HCNC,CPTII,S$GLB, | Performed by: FAMILY MEDICINE

## 2020-11-09 PROCEDURE — 1101F PT FALLS ASSESS-DOCD LE1/YR: CPT | Mod: HCNC,CPTII,S$GLB, | Performed by: FAMILY MEDICINE

## 2020-11-09 RX ORDER — ATORVASTATIN CALCIUM 20 MG/1
20 TABLET, FILM COATED ORAL DAILY
Qty: 90 TABLET | Refills: 1 | Status: SHIPPED | OUTPATIENT
Start: 2020-11-09 | End: 2021-04-27 | Stop reason: SDUPTHER

## 2020-11-09 RX ORDER — INFLUENZA A VIRUS A/NEBRASKA/14/2019 (H1N1) ANTIGEN (MDCK CELL DERIVED, PROPIOLACTONE INACTIVATED), INFLUENZA A VIRUS A/DELAWARE/39/2019 (H3N2) ANTIGEN (MDCK CELL DERIVED, PROPIOLACTONE INACTIVATED), INFLUENZA B VIRUS B/SINGAPORE/INFTT-16-0610/2016 ANTIGEN (MDCK CELL DERIVED, PROPIOLACTONE INACTIVATED), INFLUENZA B VIRUS B/DARWIN/7/2019 ANTIGEN (MDCK CELL DERIVED, PROPIOLACTONE INACTIVATED) 15; 15; 15; 15 UG/.5ML; UG/.5ML; UG/.5ML; UG/.5ML
INJECTION, SUSPENSION INTRAMUSCULAR
COMMUNITY
Start: 2020-10-03 | End: 2023-02-07

## 2020-11-09 RX ORDER — AMLODIPINE BESYLATE 10 MG/1
10 TABLET ORAL DAILY
Qty: 90 TABLET | Refills: 1 | Status: SHIPPED | OUTPATIENT
Start: 2020-11-09 | End: 2021-04-25

## 2020-11-09 NOTE — PROGRESS NOTES
Chief Complaint   Patient presents with    Hypertension    Hyperlipidemia       Ascencion Owens is a 65 y.o. male who presents per the Chief Complaint.  Pt is known to me and was last seen by me on 1/10/2020.  All known chronic medical issues have been documented.    Hypertension  This is a chronic problem. The current episode started more than 1 year ago. The problem is unchanged. The problem is controlled. Pertinent negatives include no anxiety, blurred vision, chest pain, headaches (history of cluster headache), malaise/fatigue, neck pain, orthopnea, palpitations, peripheral edema, PND, shortness of breath or sweats. There are no associated agents to hypertension. Risk factors for coronary artery disease include male gender, obesity and dyslipidemia. Past treatments include ACE inhibitors, diuretics and calcium channel blockers. The current treatment provides moderate improvement. There are no compliance problems.  There is no history of angina, kidney disease, CAD/MI, CVA, heart failure, left ventricular hypertrophy, PVD or retinopathy. There is no history of chronic renal disease, coarctation of the aorta, hyperaldosteronism, hyperparathyroidism, a hypertension causing med, renovascular disease, sleep apnea or a thyroid problem.   Hyperlipidemia  He has no history of chronic renal disease. Pertinent negatives include no chest pain, myalgias or shortness of breath.     ROS  Review of Systems   Constitutional: Negative.  Negative for activity change, appetite change, chills, diaphoresis, fatigue, fever, malaise/fatigue and unexpected weight change.   HENT: Negative.  Negative for congestion, ear pain, hearing loss, nosebleeds, postnasal drip, rhinorrhea, sinus pressure, sneezing, sore throat and trouble swallowing.    Eyes: Negative for blurred vision, pain and visual disturbance.   Respiratory: Negative for cough, choking and shortness of breath.    Cardiovascular: Negative for chest pain, palpitations,  "orthopnea, leg swelling and PND.   Gastrointestinal: Negative for abdominal pain, constipation, diarrhea, nausea and vomiting.   Genitourinary: Negative for difficulty urinating, dysuria, frequency and urgency.   Musculoskeletal: Negative.  Negative for arthralgias, back pain, gait problem, joint swelling, myalgias and neck pain.   Skin: Negative.    Allergic/Immunologic: Negative for environmental allergies and food allergies.   Neurological: Negative.  Negative for dizziness, seizures, syncope, weakness, light-headedness and headaches (history of cluster headache).   Psychiatric/Behavioral: Negative.  Negative for confusion, decreased concentration, dysphoric mood and sleep disturbance. The patient is not nervous/anxious.        Physical Exam  Vitals:    11/09/20 1424   BP: 124/76   Pulse: 69   Resp: 18   Temp: 98.3 °F (36.8 °C)    Body mass index is 31.91 kg/m².  Weight: 98 kg (216 lb 0.8 oz)   Height: 5' 9" (175.3 cm)     Physical Exam  Constitutional:       General: He is not in acute distress.     Appearance: Normal appearance. He is well-developed. He is not ill-appearing, toxic-appearing or diaphoretic.   HENT:      Head: Normocephalic and atraumatic.      Right Ear: Hearing and external ear normal. No decreased hearing noted.      Left Ear: Hearing and external ear normal. No decreased hearing noted.      Nose: Nose normal. No nasal deformity or rhinorrhea.      Mouth/Throat:      Dentition: Normal dentition. Does not have dentures.      Pharynx: Uvula midline.   Eyes:      General: Lids are normal. No scleral icterus.        Right eye: No foreign body or discharge.         Left eye: No foreign body or discharge.      Conjunctiva/sclera: Conjunctivae normal.      Right eye: No chemosis or exudate.     Left eye: No chemosis or exudate.     Pupils: Pupils are equal, round, and reactive to light.   Neck:      Musculoskeletal: Full passive range of motion without pain, normal range of motion and neck supple. "   Cardiovascular:      Rate and Rhythm: Normal rate and regular rhythm.      Heart sounds: Normal heart sounds, S1 normal and S2 normal. No murmur. No friction rub. No gallop.    Pulmonary:      Effort: Pulmonary effort is normal. No accessory muscle usage or respiratory distress.      Breath sounds: Normal breath sounds. No decreased breath sounds, wheezing, rhonchi or rales.   Abdominal:      General: There is no distension.      Palpations: Abdomen is soft. Abdomen is not rigid.      Tenderness: There is no abdominal tenderness. There is no guarding or rebound.   Musculoskeletal: Normal range of motion.   Skin:     General: Skin is warm and dry.      Findings: No rash.   Neurological:      Mental Status: He is alert and oriented to person, place, and time.      Cranial Nerves: No cranial nerve deficit.      Sensory: No sensory deficit.      Motor: No abnormal muscle tone or seizure activity.      Coordination: Coordination normal.      Gait: Gait normal.   Psychiatric:         Attention and Perception: He is attentive.         Speech: Speech normal.         Behavior: Behavior normal. Behavior is cooperative.         Thought Content: Thought content normal.         Judgment: Judgment normal.       Assessment & Plan    Discussion of plan of care including treatment options regarding health and wellness were reviewed and discussed with patient.  Any changes to medication or treatment plan, as well as any screening blood test, imaging, or referrals to specialist, are documented.  Follow up as indicated.     1. Essential hypertension  Patient was counseled and encouraged to maintain a low sodium diet, as well as increasing physical activity.  Recommend random BP checks at home on a regular basis.  Repeat BP at end of visit was not necessary. Will continue medication at this time, and follow up in 3-6 months, or sooner if blood pressure begins to increase.     - amLODIPine (NORVASC) 10 MG tablet; Take 1 tablet (10 mg  total) by mouth once daily.  Dispense: 90 tablet; Refill: 1  - CBC Auto Differential; Future  - Comprehensive Metabolic Panel; Future    2. Hyperlipidemia, acquired  We discussed ways to manage cholesterol levels, including increasing whole grain foods and decreasing fried and fatty foods.  I also recommended OTC Omega 3 and Omega 6 supplements to improve overall cholesterol levels.  Will continue current therapy at this visit and will monitor lipids appropriately.   - atorvastatin (LIPITOR) 20 MG tablet; Take 1 tablet (20 mg total) by mouth once daily.  Dispense: 90 tablet; Refill: 1  - Lipid Panel; Future    3. Special screening for malignant neoplasm of prostate  Screening test will be ordered and once results available patient will be notified of results and managed accordingly.   - PSA, Screening; Future    4. Personal history of other endocrine, nutritional and metabolic disease  Screening test will be ordered and once results available patient will be notified of results and managed accordingly.   - TSH; Future  - T4, Free; Future  - Hemoglobin A1C; Future    5. Vitamin D insufficiency  Screening test will be ordered and once results available patient will be notified of results and managed accordingly.   - Vitamin D; Future       Follow up in about 3 months (around 2/9/2021) for Chronic Disease Management.      ACTIVE MEDICAL ISSUES:  Documented in Problem List    PAST MEDICAL HISTORY  Documented  .  PAST SURGICAL HISTORY:  Documented    SOCIAL HISTORY:  Documented    FAMILY HISTORY:  Documented    ALLERGIES AND MEDICATIONS: updated and reviewed.  Documented    Health Maintenance       Date Due Completion Date    HIV Screening 10/19/1970 ---    Pneumococcal Vaccine (65+ Low/Medium Risk) (1 of 2 - PCV13) 10/19/2020 9/23/2017    Abdominal Aortic Aneurysm Screening 10/19/2020 ---    Colorectal Cancer Screening 06/10/2025 6/10/2015    Lipid Panel 10/22/2025 10/22/2020    TETANUS VACCINE 10/02/2029 10/2/2019

## 2020-11-17 ENCOUNTER — LAB VISIT (OUTPATIENT)
Dept: URGENT CARE | Facility: CLINIC | Age: 65
End: 2020-11-17
Payer: MEDICARE

## 2020-11-17 DIAGNOSIS — Z11.9 ENCOUNTER FOR SCREENING EXAMINATION FOR INFECTIOUS DISEASE: Primary | ICD-10-CM

## 2020-11-17 LAB
CTP QC/QA: YES
SARS-COV-2 RDRP RESP QL NAA+PROBE: NEGATIVE

## 2020-11-17 PROCEDURE — U0002: ICD-10-PCS | Mod: QW,S$GLB,, | Performed by: PHYSICIAN ASSISTANT

## 2020-11-17 PROCEDURE — U0002 COVID-19 LAB TEST NON-CDC: HCPCS | Mod: QW,S$GLB,, | Performed by: PHYSICIAN ASSISTANT

## 2020-11-17 NOTE — LETTER
[address] [facility] [address], [facility] [place] [address]  Phone: [phone]  Fax: [phone]          Return to Work/School    Patient: Ascencion Owens  YOB: 1955   Date: 11/17/2020     To Whom It May Concern:     Ascencion Owens was in contact with/seen in my office on 11/17/2020. COVID-19 is present in our communities across the state. There is limited testing for COVID at this time, so not all patients can be tested. In this situation, your employee meets the following criteria:     Ascencion Owens has met the criteria for COVID-19 testing and has a NEGATIVE result. The employee can return to work once they are asymptomatic for 24 hours without the use of fever reducing medications (Tylenol, Motrin, etc).     If you have any questions or concerns, or if I can be of further assistance, please do not hesitate to contact me.     Sincerely,    [facility]

## 2020-12-11 ENCOUNTER — PATIENT MESSAGE (OUTPATIENT)
Dept: OTHER | Facility: OTHER | Age: 65
End: 2020-12-11

## 2021-02-07 ENCOUNTER — PATIENT MESSAGE (OUTPATIENT)
Dept: ADMINISTRATIVE | Facility: CLINIC | Age: 66
End: 2021-02-07

## 2021-04-22 DIAGNOSIS — I10 ESSENTIAL HYPERTENSION: ICD-10-CM

## 2021-04-25 RX ORDER — AMLODIPINE BESYLATE 10 MG/1
TABLET ORAL
Qty: 90 TABLET | Refills: 0 | Status: SHIPPED | OUTPATIENT
Start: 2021-04-25 | End: 2021-06-08 | Stop reason: SDUPTHER

## 2021-04-27 DIAGNOSIS — E78.5 HYPERLIPIDEMIA, ACQUIRED: ICD-10-CM

## 2021-04-28 ENCOUNTER — PATIENT MESSAGE (OUTPATIENT)
Dept: FAMILY MEDICINE | Facility: CLINIC | Age: 66
End: 2021-04-28

## 2021-05-02 RX ORDER — ATORVASTATIN CALCIUM 20 MG/1
20 TABLET, FILM COATED ORAL DAILY
Qty: 90 TABLET | Refills: 1 | Status: SHIPPED | OUTPATIENT
Start: 2021-05-02 | End: 2021-06-08 | Stop reason: SDUPTHER

## 2021-06-07 ENCOUNTER — LAB VISIT (OUTPATIENT)
Dept: LAB | Facility: HOSPITAL | Age: 66
End: 2021-06-07
Attending: FAMILY MEDICINE
Payer: MEDICARE

## 2021-06-07 DIAGNOSIS — E78.5 HYPERLIPIDEMIA, ACQUIRED: ICD-10-CM

## 2021-06-07 DIAGNOSIS — Z86.39 PERSONAL HISTORY OF OTHER ENDOCRINE, NUTRITIONAL AND METABOLIC DISEASE: ICD-10-CM

## 2021-06-07 DIAGNOSIS — I10 ESSENTIAL HYPERTENSION: ICD-10-CM

## 2021-06-07 DIAGNOSIS — Z12.5 SPECIAL SCREENING FOR MALIGNANT NEOPLASM OF PROSTATE: ICD-10-CM

## 2021-06-07 DIAGNOSIS — E55.9 VITAMIN D INSUFFICIENCY: ICD-10-CM

## 2021-06-07 LAB
25(OH)D3+25(OH)D2 SERPL-MCNC: 53 NG/ML (ref 30–96)
BASOPHILS # BLD AUTO: 0.06 K/UL (ref 0–0.2)
BASOPHILS NFR BLD: 0.9 % (ref 0–1.9)
COMPLEXED PSA SERPL-MCNC: 0.51 NG/ML (ref 0–4)
DIFFERENTIAL METHOD: NORMAL
EOSINOPHIL # BLD AUTO: 0.2 K/UL (ref 0–0.5)
EOSINOPHIL NFR BLD: 2.8 % (ref 0–8)
ERYTHROCYTE [DISTWIDTH] IN BLOOD BY AUTOMATED COUNT: 14.1 % (ref 11.5–14.5)
ESTIMATED AVG GLUCOSE: 114 MG/DL (ref 68–131)
HBA1C MFR BLD: 5.6 % (ref 4–5.6)
HCT VFR BLD AUTO: 46.3 % (ref 40–54)
HGB BLD-MCNC: 15.2 G/DL (ref 14–18)
IMM GRANULOCYTES # BLD AUTO: 0.01 K/UL (ref 0–0.04)
IMM GRANULOCYTES NFR BLD AUTO: 0.2 % (ref 0–0.5)
LYMPHOCYTES # BLD AUTO: 2.3 K/UL (ref 1–4.8)
LYMPHOCYTES NFR BLD: 35.6 % (ref 18–48)
MCH RBC QN AUTO: 27.4 PG (ref 27–31)
MCHC RBC AUTO-ENTMCNC: 32.8 G/DL (ref 32–36)
MCV RBC AUTO: 84 FL (ref 82–98)
MONOCYTES # BLD AUTO: 0.6 K/UL (ref 0.3–1)
MONOCYTES NFR BLD: 9.5 % (ref 4–15)
NEUTROPHILS # BLD AUTO: 3.3 K/UL (ref 1.8–7.7)
NEUTROPHILS NFR BLD: 51 % (ref 38–73)
NRBC BLD-RTO: 0 /100 WBC
PLATELET # BLD AUTO: 204 K/UL (ref 150–450)
PMV BLD AUTO: 11.8 FL (ref 9.2–12.9)
RBC # BLD AUTO: 5.54 M/UL (ref 4.6–6.2)
WBC # BLD AUTO: 6.4 K/UL (ref 3.9–12.7)

## 2021-06-07 PROCEDURE — 85025 COMPLETE CBC W/AUTO DIFF WBC: CPT | Performed by: FAMILY MEDICINE

## 2021-06-07 PROCEDURE — 80061 LIPID PANEL: CPT | Performed by: FAMILY MEDICINE

## 2021-06-07 PROCEDURE — 82306 VITAMIN D 25 HYDROXY: CPT | Performed by: FAMILY MEDICINE

## 2021-06-07 PROCEDURE — 84439 ASSAY OF FREE THYROXINE: CPT | Performed by: FAMILY MEDICINE

## 2021-06-07 PROCEDURE — 84153 ASSAY OF PSA TOTAL: CPT | Performed by: FAMILY MEDICINE

## 2021-06-07 PROCEDURE — 83036 HEMOGLOBIN GLYCOSYLATED A1C: CPT | Performed by: FAMILY MEDICINE

## 2021-06-07 PROCEDURE — 36415 COLL VENOUS BLD VENIPUNCTURE: CPT | Mod: PO | Performed by: FAMILY MEDICINE

## 2021-06-07 PROCEDURE — 80053 COMPREHEN METABOLIC PANEL: CPT | Performed by: FAMILY MEDICINE

## 2021-06-07 PROCEDURE — 84443 ASSAY THYROID STIM HORMONE: CPT | Performed by: FAMILY MEDICINE

## 2021-06-08 ENCOUNTER — OFFICE VISIT (OUTPATIENT)
Dept: FAMILY MEDICINE | Facility: CLINIC | Age: 66
End: 2021-06-08
Payer: MEDICARE

## 2021-06-08 VITALS
BODY MASS INDEX: 33.27 KG/M2 | HEIGHT: 69 IN | HEART RATE: 70 BPM | WEIGHT: 224.63 LBS | TEMPERATURE: 98 F | OXYGEN SATURATION: 97 % | SYSTOLIC BLOOD PRESSURE: 134 MMHG | DIASTOLIC BLOOD PRESSURE: 80 MMHG | RESPIRATION RATE: 16 BRPM

## 2021-06-08 DIAGNOSIS — I10 ESSENTIAL HYPERTENSION: Primary | ICD-10-CM

## 2021-06-08 DIAGNOSIS — E78.5 HYPERLIPIDEMIA, ACQUIRED: ICD-10-CM

## 2021-06-08 DIAGNOSIS — R60.0 PERIPHERAL EDEMA: ICD-10-CM

## 2021-06-08 LAB
ALBUMIN SERPL BCP-MCNC: 3.8 G/DL (ref 3.5–5.2)
ALP SERPL-CCNC: 58 U/L (ref 55–135)
ALT SERPL W/O P-5'-P-CCNC: 18 U/L (ref 10–44)
ANION GAP SERPL CALC-SCNC: 8 MMOL/L (ref 8–16)
AST SERPL-CCNC: 22 U/L (ref 10–40)
BILIRUB SERPL-MCNC: 0.7 MG/DL (ref 0.1–1)
BUN SERPL-MCNC: 13 MG/DL (ref 8–23)
CALCIUM SERPL-MCNC: 9.3 MG/DL (ref 8.7–10.5)
CHLORIDE SERPL-SCNC: 108 MMOL/L (ref 95–110)
CHOLEST SERPL-MCNC: 138 MG/DL (ref 120–199)
CHOLEST/HDLC SERPL: 2.7 {RATIO} (ref 2–5)
CO2 SERPL-SCNC: 26 MMOL/L (ref 23–29)
CREAT SERPL-MCNC: 1.2 MG/DL (ref 0.5–1.4)
EST. GFR  (AFRICAN AMERICAN): >60 ML/MIN/1.73 M^2
EST. GFR  (NON AFRICAN AMERICAN): >60 ML/MIN/1.73 M^2
GLUCOSE SERPL-MCNC: 97 MG/DL (ref 70–110)
HDLC SERPL-MCNC: 51 MG/DL (ref 40–75)
HDLC SERPL: 37 % (ref 20–50)
LDLC SERPL CALC-MCNC: 80.2 MG/DL (ref 63–159)
NONHDLC SERPL-MCNC: 87 MG/DL
POTASSIUM SERPL-SCNC: 4.1 MMOL/L (ref 3.5–5.1)
PROT SERPL-MCNC: 7.2 G/DL (ref 6–8.4)
SODIUM SERPL-SCNC: 142 MMOL/L (ref 136–145)
T4 FREE SERPL-MCNC: 0.93 NG/DL (ref 0.71–1.51)
TRIGL SERPL-MCNC: 34 MG/DL (ref 30–150)
TSH SERPL DL<=0.005 MIU/L-ACNC: 2.23 UIU/ML (ref 0.4–4)

## 2021-06-08 PROCEDURE — 3008F PR BODY MASS INDEX (BMI) DOCUMENTED: ICD-10-PCS | Mod: CPTII,S$GLB,, | Performed by: FAMILY MEDICINE

## 2021-06-08 PROCEDURE — 99499 RISK ADDL DX/OHS AUDIT: ICD-10-PCS | Mod: HCNC,S$GLB,, | Performed by: FAMILY MEDICINE

## 2021-06-08 PROCEDURE — 99999 PR PBB SHADOW E&M-EST. PATIENT-LVL III: ICD-10-PCS | Mod: PBBFAC,,, | Performed by: FAMILY MEDICINE

## 2021-06-08 PROCEDURE — 3079F DIAST BP 80-89 MM HG: CPT | Mod: CPTII,S$GLB,, | Performed by: FAMILY MEDICINE

## 2021-06-08 PROCEDURE — 99214 OFFICE O/P EST MOD 30 MIN: CPT | Mod: S$GLB,,, | Performed by: FAMILY MEDICINE

## 2021-06-08 PROCEDURE — 3288F PR FALLS RISK ASSESSMENT DOCUMENTED: ICD-10-PCS | Mod: CPTII,S$GLB,, | Performed by: FAMILY MEDICINE

## 2021-06-08 PROCEDURE — 1101F PR PT FALLS ASSESS DOC 0-1 FALLS W/OUT INJ PAST YR: ICD-10-PCS | Mod: CPTII,S$GLB,, | Performed by: FAMILY MEDICINE

## 2021-06-08 PROCEDURE — 99214 PR OFFICE/OUTPT VISIT, EST, LEVL IV, 30-39 MIN: ICD-10-PCS | Mod: S$GLB,,, | Performed by: FAMILY MEDICINE

## 2021-06-08 PROCEDURE — 3008F BODY MASS INDEX DOCD: CPT | Mod: CPTII,S$GLB,, | Performed by: FAMILY MEDICINE

## 2021-06-08 PROCEDURE — 1126F PR PAIN SEVERITY QUANTIFIED, NO PAIN PRESENT: ICD-10-PCS | Mod: S$GLB,,, | Performed by: FAMILY MEDICINE

## 2021-06-08 PROCEDURE — 99999 PR PBB SHADOW E&M-EST. PATIENT-LVL III: CPT | Mod: PBBFAC,,, | Performed by: FAMILY MEDICINE

## 2021-06-08 PROCEDURE — 99499 UNLISTED E&M SERVICE: CPT | Mod: HCNC,S$GLB,, | Performed by: FAMILY MEDICINE

## 2021-06-08 PROCEDURE — 3075F PR MOST RECENT SYSTOLIC BLOOD PRESS GE 130-139MM HG: ICD-10-PCS | Mod: CPTII,S$GLB,, | Performed by: FAMILY MEDICINE

## 2021-06-08 PROCEDURE — 3288F FALL RISK ASSESSMENT DOCD: CPT | Mod: CPTII,S$GLB,, | Performed by: FAMILY MEDICINE

## 2021-06-08 PROCEDURE — 1101F PT FALLS ASSESS-DOCD LE1/YR: CPT | Mod: CPTII,S$GLB,, | Performed by: FAMILY MEDICINE

## 2021-06-08 PROCEDURE — 1126F AMNT PAIN NOTED NONE PRSNT: CPT | Mod: S$GLB,,, | Performed by: FAMILY MEDICINE

## 2021-06-08 PROCEDURE — 3075F SYST BP GE 130 - 139MM HG: CPT | Mod: CPTII,S$GLB,, | Performed by: FAMILY MEDICINE

## 2021-06-08 PROCEDURE — 3079F PR MOST RECENT DIASTOLIC BLOOD PRESSURE 80-89 MM HG: ICD-10-PCS | Mod: CPTII,S$GLB,, | Performed by: FAMILY MEDICINE

## 2021-06-08 RX ORDER — ATORVASTATIN CALCIUM 20 MG/1
20 TABLET, FILM COATED ORAL DAILY
Qty: 90 TABLET | Refills: 0 | Status: SHIPPED | OUTPATIENT
Start: 2021-06-08 | End: 2021-06-11 | Stop reason: SDUPTHER

## 2021-06-08 RX ORDER — AMLODIPINE BESYLATE 10 MG/1
10 TABLET ORAL DAILY
Qty: 90 TABLET | Refills: 1 | Status: SHIPPED | OUTPATIENT
Start: 2021-06-08 | End: 2021-06-22 | Stop reason: SDUPTHER

## 2021-06-11 DIAGNOSIS — E78.5 HYPERLIPIDEMIA, ACQUIRED: ICD-10-CM

## 2021-06-11 RX ORDER — ATORVASTATIN CALCIUM 20 MG/1
20 TABLET, FILM COATED ORAL DAILY
Qty: 90 TABLET | Refills: 3 | Status: SHIPPED | OUTPATIENT
Start: 2021-06-11 | End: 2022-05-02 | Stop reason: SDUPTHER

## 2021-06-22 ENCOUNTER — TELEPHONE (OUTPATIENT)
Dept: FAMILY MEDICINE | Facility: CLINIC | Age: 66
End: 2021-06-22

## 2021-06-22 DIAGNOSIS — I10 ESSENTIAL HYPERTENSION: ICD-10-CM

## 2021-06-23 ENCOUNTER — PATIENT MESSAGE (OUTPATIENT)
Dept: FAMILY MEDICINE | Facility: CLINIC | Age: 66
End: 2021-06-23

## 2021-06-23 RX ORDER — AMLODIPINE BESYLATE 10 MG/1
10 TABLET ORAL DAILY
Qty: 90 TABLET | Refills: 1 | Status: SHIPPED | OUTPATIENT
Start: 2021-06-23 | End: 2021-12-07 | Stop reason: SDUPTHER

## 2021-07-09 ENCOUNTER — OFFICE VISIT (OUTPATIENT)
Dept: URGENT CARE | Facility: CLINIC | Age: 66
End: 2021-07-09
Payer: MEDICARE

## 2021-07-09 VITALS
OXYGEN SATURATION: 100 % | HEIGHT: 69 IN | SYSTOLIC BLOOD PRESSURE: 118 MMHG | BODY MASS INDEX: 33.18 KG/M2 | WEIGHT: 224 LBS | DIASTOLIC BLOOD PRESSURE: 73 MMHG | HEART RATE: 86 BPM | TEMPERATURE: 100 F | RESPIRATION RATE: 16 BRPM

## 2021-07-09 DIAGNOSIS — R51.9 ACUTE NONINTRACTABLE HEADACHE, UNSPECIFIED HEADACHE TYPE: ICD-10-CM

## 2021-07-09 DIAGNOSIS — R50.9 FEVER, UNSPECIFIED FEVER CAUSE: Primary | ICD-10-CM

## 2021-07-09 DIAGNOSIS — R82.90 ABNORMAL URINALYSIS: ICD-10-CM

## 2021-07-09 DIAGNOSIS — R35.0 URINARY FREQUENCY: ICD-10-CM

## 2021-07-09 DIAGNOSIS — R10.9 ABDOMINAL CRAMPING: ICD-10-CM

## 2021-07-09 LAB
BILIRUB UR QL STRIP: POSITIVE
CTP QC/QA: YES
GLUCOSE UR QL STRIP: NEGATIVE
KETONES UR QL STRIP: NEGATIVE
LEUKOCYTE ESTERASE UR QL STRIP: NEGATIVE
PH, POC UA: 5.5 (ref 5–8)
POC BLOOD, URINE: NEGATIVE
POC NITRATES, URINE: NEGATIVE
PROT UR QL STRIP: POSITIVE
SARS-COV-2 RDRP RESP QL NAA+PROBE: NEGATIVE
SP GR UR STRIP: 1.01 (ref 1–1.03)
UROBILINOGEN UR STRIP-ACNC: 8 (ref 0.3–2.2)

## 2021-07-09 PROCEDURE — U0002 COVID-19 LAB TEST NON-CDC: HCPCS | Mod: QW,S$GLB,, | Performed by: NURSE PRACTITIONER

## 2021-07-09 PROCEDURE — 3008F BODY MASS INDEX DOCD: CPT | Mod: CPTII,S$GLB,, | Performed by: NURSE PRACTITIONER

## 2021-07-09 PROCEDURE — 87086 URINE CULTURE/COLONY COUNT: CPT | Performed by: NURSE PRACTITIONER

## 2021-07-09 PROCEDURE — U0002: ICD-10-PCS | Mod: QW,S$GLB,, | Performed by: NURSE PRACTITIONER

## 2021-07-09 PROCEDURE — 99214 PR OFFICE/OUTPT VISIT, EST, LEVL IV, 30-39 MIN: ICD-10-PCS | Mod: 25,S$GLB,, | Performed by: NURSE PRACTITIONER

## 2021-07-09 PROCEDURE — 81003 URINALYSIS AUTO W/O SCOPE: CPT | Mod: QW,S$GLB,, | Performed by: NURSE PRACTITIONER

## 2021-07-09 PROCEDURE — 81003 POCT URINALYSIS, DIPSTICK, AUTOMATED, W/O SCOPE: ICD-10-PCS | Mod: QW,S$GLB,, | Performed by: NURSE PRACTITIONER

## 2021-07-09 PROCEDURE — 80074 ACUTE HEPATITIS PANEL: CPT | Performed by: NURSE PRACTITIONER

## 2021-07-09 PROCEDURE — 3008F PR BODY MASS INDEX (BMI) DOCUMENTED: ICD-10-PCS | Mod: CPTII,S$GLB,, | Performed by: NURSE PRACTITIONER

## 2021-07-09 PROCEDURE — 99214 OFFICE O/P EST MOD 30 MIN: CPT | Mod: 25,S$GLB,, | Performed by: NURSE PRACTITIONER

## 2021-07-10 ENCOUNTER — NURSE TRIAGE (OUTPATIENT)
Dept: ADMINISTRATIVE | Facility: CLINIC | Age: 66
End: 2021-07-10

## 2021-07-10 ENCOUNTER — OFFICE VISIT (OUTPATIENT)
Dept: URGENT CARE | Facility: CLINIC | Age: 66
End: 2021-07-10
Payer: MEDICARE

## 2021-07-10 VITALS
HEIGHT: 69 IN | TEMPERATURE: 98 F | SYSTOLIC BLOOD PRESSURE: 127 MMHG | BODY MASS INDEX: 33.33 KG/M2 | HEART RATE: 81 BPM | DIASTOLIC BLOOD PRESSURE: 66 MMHG | OXYGEN SATURATION: 96 % | WEIGHT: 225 LBS | RESPIRATION RATE: 16 BRPM

## 2021-07-10 DIAGNOSIS — R50.9 FEVER, UNSPECIFIED FEVER CAUSE: Primary | ICD-10-CM

## 2021-07-10 DIAGNOSIS — B35.0 TINEA CAPITIS: ICD-10-CM

## 2021-07-10 DIAGNOSIS — J18.9 COMMUNITY ACQUIRED PNEUMONIA, BILATERAL: ICD-10-CM

## 2021-07-10 LAB
BACTERIA UR CULT: NO GROWTH
CTP QC/QA: YES
CTP QC/QA: YES
FLUAV AG NPH QL: NEGATIVE
FLUBV AG NPH QL: NEGATIVE
SARS-COV-2 RDRP RESP QL NAA+PROBE: NEGATIVE

## 2021-07-10 PROCEDURE — 3008F BODY MASS INDEX DOCD: CPT | Mod: CPTII,S$GLB,, | Performed by: PHYSICIAN ASSISTANT

## 2021-07-10 PROCEDURE — 71046 XR CHEST PA AND LATERAL: ICD-10-PCS | Mod: S$GLB,,, | Performed by: RADIOLOGY

## 2021-07-10 PROCEDURE — 87804 POCT INFLUENZA A/B: ICD-10-PCS | Mod: QW,S$GLB,, | Performed by: PHYSICIAN ASSISTANT

## 2021-07-10 PROCEDURE — 71046 X-RAY EXAM CHEST 2 VIEWS: CPT | Mod: S$GLB,,, | Performed by: RADIOLOGY

## 2021-07-10 PROCEDURE — U0002 COVID-19 LAB TEST NON-CDC: HCPCS | Mod: QW,S$GLB,, | Performed by: PHYSICIAN ASSISTANT

## 2021-07-10 PROCEDURE — U0002: ICD-10-PCS | Mod: QW,S$GLB,, | Performed by: PHYSICIAN ASSISTANT

## 2021-07-10 PROCEDURE — 3008F PR BODY MASS INDEX (BMI) DOCUMENTED: ICD-10-PCS | Mod: CPTII,S$GLB,, | Performed by: PHYSICIAN ASSISTANT

## 2021-07-10 PROCEDURE — 87804 INFLUENZA ASSAY W/OPTIC: CPT | Mod: QW,S$GLB,, | Performed by: PHYSICIAN ASSISTANT

## 2021-07-10 PROCEDURE — 99214 PR OFFICE/OUTPT VISIT, EST, LEVL IV, 30-39 MIN: ICD-10-PCS | Mod: 25,S$GLB,, | Performed by: PHYSICIAN ASSISTANT

## 2021-07-10 PROCEDURE — 99214 OFFICE O/P EST MOD 30 MIN: CPT | Mod: 25,S$GLB,, | Performed by: PHYSICIAN ASSISTANT

## 2021-07-10 RX ORDER — KETOCONAZOLE 20 MG/G
CREAM TOPICAL DAILY
Qty: 60 G | Refills: 0 | Status: SHIPPED | OUTPATIENT
Start: 2021-07-10 | End: 2023-02-07

## 2021-07-10 RX ORDER — AZITHROMYCIN 250 MG/1
TABLET, FILM COATED ORAL
Qty: 6 TABLET | Refills: 0 | Status: SHIPPED | OUTPATIENT
Start: 2021-07-10 | End: 2021-07-16 | Stop reason: ALTCHOICE

## 2021-07-10 RX ORDER — AMOXICILLIN AND CLAVULANATE POTASSIUM 875; 125 MG/1; MG/1
1 TABLET, FILM COATED ORAL 2 TIMES DAILY
Qty: 14 TABLET | Refills: 0 | Status: SHIPPED | OUTPATIENT
Start: 2021-07-10 | End: 2021-07-17

## 2021-07-12 ENCOUNTER — NURSE TRIAGE (OUTPATIENT)
Dept: ADMINISTRATIVE | Facility: CLINIC | Age: 66
End: 2021-07-12

## 2021-07-12 LAB
HAV IGM SERPL QL IA: NEGATIVE
HBV CORE IGM SERPL QL IA: NEGATIVE
HBV SURFACE AG SERPL QL IA: NEGATIVE
HCV AB SERPL QL IA: NEGATIVE

## 2021-07-14 ENCOUNTER — PATIENT MESSAGE (OUTPATIENT)
Dept: FAMILY MEDICINE | Facility: CLINIC | Age: 66
End: 2021-07-14

## 2021-07-15 ENCOUNTER — TELEPHONE (OUTPATIENT)
Dept: URGENT CARE | Facility: CLINIC | Age: 66
End: 2021-07-15

## 2021-07-16 ENCOUNTER — OFFICE VISIT (OUTPATIENT)
Dept: FAMILY MEDICINE | Facility: CLINIC | Age: 66
End: 2021-07-16
Payer: MEDICARE

## 2021-07-16 ENCOUNTER — LAB VISIT (OUTPATIENT)
Dept: LAB | Facility: HOSPITAL | Age: 66
End: 2021-07-16
Attending: PHYSICIAN ASSISTANT
Payer: MEDICARE

## 2021-07-16 VITALS
HEART RATE: 78 BPM | RESPIRATION RATE: 17 BRPM | WEIGHT: 220.44 LBS | BODY MASS INDEX: 32.65 KG/M2 | OXYGEN SATURATION: 96 % | TEMPERATURE: 98 F | HEIGHT: 69 IN | SYSTOLIC BLOOD PRESSURE: 138 MMHG | DIASTOLIC BLOOD PRESSURE: 86 MMHG

## 2021-07-16 DIAGNOSIS — Z13.6 SCREENING FOR AAA (ABDOMINAL AORTIC ANEURYSM): ICD-10-CM

## 2021-07-16 DIAGNOSIS — R50.9 FEVER, UNSPECIFIED FEVER CAUSE: Primary | ICD-10-CM

## 2021-07-16 DIAGNOSIS — R50.9 FEVER, UNSPECIFIED FEVER CAUSE: ICD-10-CM

## 2021-07-16 DIAGNOSIS — R59.1 LYMPHADENOPATHY: ICD-10-CM

## 2021-07-16 LAB — HETEROPH AB SERPL QL IA: NEGATIVE

## 2021-07-16 PROCEDURE — 3008F BODY MASS INDEX DOCD: CPT | Mod: CPTII,S$GLB,, | Performed by: PHYSICIAN ASSISTANT

## 2021-07-16 PROCEDURE — 1101F PT FALLS ASSESS-DOCD LE1/YR: CPT | Mod: CPTII,S$GLB,, | Performed by: PHYSICIAN ASSISTANT

## 2021-07-16 PROCEDURE — 86308 HETEROPHILE ANTIBODY SCREEN: CPT | Performed by: PHYSICIAN ASSISTANT

## 2021-07-16 PROCEDURE — 3008F PR BODY MASS INDEX (BMI) DOCUMENTED: ICD-10-PCS | Mod: CPTII,S$GLB,, | Performed by: PHYSICIAN ASSISTANT

## 2021-07-16 PROCEDURE — 99999 PR PBB SHADOW E&M-EST. PATIENT-LVL III: CPT | Mod: PBBFAC,,, | Performed by: PHYSICIAN ASSISTANT

## 2021-07-16 PROCEDURE — 99213 OFFICE O/P EST LOW 20 MIN: CPT | Mod: S$GLB,,, | Performed by: PHYSICIAN ASSISTANT

## 2021-07-16 PROCEDURE — 99213 PR OFFICE/OUTPT VISIT, EST, LEVL III, 20-29 MIN: ICD-10-PCS | Mod: S$GLB,,, | Performed by: PHYSICIAN ASSISTANT

## 2021-07-16 PROCEDURE — 1101F PR PT FALLS ASSESS DOC 0-1 FALLS W/OUT INJ PAST YR: ICD-10-PCS | Mod: CPTII,S$GLB,, | Performed by: PHYSICIAN ASSISTANT

## 2021-07-16 PROCEDURE — 86663 EPSTEIN-BARR ANTIBODY: CPT | Performed by: PHYSICIAN ASSISTANT

## 2021-07-16 PROCEDURE — 99999 PR PBB SHADOW E&M-EST. PATIENT-LVL III: ICD-10-PCS | Mod: PBBFAC,,, | Performed by: PHYSICIAN ASSISTANT

## 2021-07-16 PROCEDURE — 3288F FALL RISK ASSESSMENT DOCD: CPT | Mod: CPTII,S$GLB,, | Performed by: PHYSICIAN ASSISTANT

## 2021-07-16 PROCEDURE — 1126F AMNT PAIN NOTED NONE PRSNT: CPT | Mod: CPTII,S$GLB,, | Performed by: PHYSICIAN ASSISTANT

## 2021-07-16 PROCEDURE — 36415 COLL VENOUS BLD VENIPUNCTURE: CPT | Mod: PO | Performed by: PHYSICIAN ASSISTANT

## 2021-07-16 PROCEDURE — 1126F PR PAIN SEVERITY QUANTIFIED, NO PAIN PRESENT: ICD-10-PCS | Mod: CPTII,S$GLB,, | Performed by: PHYSICIAN ASSISTANT

## 2021-07-16 PROCEDURE — 3288F PR FALLS RISK ASSESSMENT DOCUMENTED: ICD-10-PCS | Mod: CPTII,S$GLB,, | Performed by: PHYSICIAN ASSISTANT

## 2021-07-16 RX ORDER — MELOXICAM 15 MG/1
15 TABLET ORAL DAILY
Qty: 90 TABLET | Refills: 0 | Status: SHIPPED | OUTPATIENT
Start: 2021-07-16 | End: 2022-03-16 | Stop reason: SDUPTHER

## 2021-07-22 LAB
EBV EA IGG SER-ACNC: <5 U/ML
EBV NA IGG SER-ACNC: 124 U/ML
EBV VCA IGG SER-ACNC: >750 U/ML
EBV VCA IGM SER-ACNC: <10 U/ML

## 2021-09-28 ENCOUNTER — IMMUNIZATION (OUTPATIENT)
Dept: INTERNAL MEDICINE | Facility: CLINIC | Age: 66
End: 2021-09-28
Payer: MEDICARE

## 2021-09-28 DIAGNOSIS — Z23 NEED FOR VACCINATION: Primary | ICD-10-CM

## 2021-09-28 PROCEDURE — 0003A COVID-19, MRNA, LNP-S, PF, 30 MCG/0.3 ML DOSE VACCINE: CPT | Mod: HCNC,PBBFAC | Performed by: INTERNAL MEDICINE

## 2021-09-28 PROCEDURE — 91300 COVID-19, MRNA, LNP-S, PF, 30 MCG/0.3 ML DOSE VACCINE: CPT | Mod: HCNC,PBBFAC | Performed by: INTERNAL MEDICINE

## 2021-10-28 ENCOUNTER — HOSPITAL ENCOUNTER (EMERGENCY)
Facility: HOSPITAL | Age: 66
Discharge: HOME | End: 2021-10-28
Attending: EMERGENCY MEDICINE
Payer: MEDICARE

## 2021-10-28 ENCOUNTER — TELEPHONE (OUTPATIENT)
Dept: FAMILY MEDICINE | Facility: CLINIC | Age: 66
End: 2021-10-28
Payer: MEDICARE

## 2021-10-28 VITALS
TEMPERATURE: 98 F | HEIGHT: 69 IN | HEART RATE: 53 BPM | OXYGEN SATURATION: 98 % | RESPIRATION RATE: 19 BRPM | DIASTOLIC BLOOD PRESSURE: 68 MMHG | WEIGHT: 210 LBS | SYSTOLIC BLOOD PRESSURE: 139 MMHG | BODY MASS INDEX: 31.1 KG/M2

## 2021-10-28 DIAGNOSIS — F41.9 ANXIETY: ICD-10-CM

## 2021-10-28 DIAGNOSIS — R07.89 CHEST WALL PAIN: Primary | ICD-10-CM

## 2021-10-28 LAB
ALBUMIN SERPL BCP-MCNC: 4 G/DL (ref 3.5–5.2)
ALP SERPL-CCNC: 69 U/L (ref 55–135)
ALT SERPL W/O P-5'-P-CCNC: 22 U/L (ref 10–44)
ANION GAP SERPL CALC-SCNC: 10 MMOL/L (ref 8–16)
AST SERPL-CCNC: 29 U/L (ref 10–40)
BASOPHILS # BLD AUTO: 0.06 K/UL (ref 0–0.2)
BASOPHILS NFR BLD: 0.8 % (ref 0–1.9)
BILIRUB SERPL-MCNC: 0.5 MG/DL (ref 0.1–1)
BNP SERPL-MCNC: <10 PG/ML (ref 0–99)
BUN SERPL-MCNC: 14 MG/DL (ref 8–23)
CALCIUM SERPL-MCNC: 9.9 MG/DL (ref 8.7–10.5)
CHLORIDE SERPL-SCNC: 107 MMOL/L (ref 95–110)
CO2 SERPL-SCNC: 26 MMOL/L (ref 23–29)
CREAT SERPL-MCNC: 1.1 MG/DL (ref 0.5–1.4)
DIFFERENTIAL METHOD: NORMAL
EOSINOPHIL # BLD AUTO: 0.4 K/UL (ref 0–0.5)
EOSINOPHIL NFR BLD: 4.8 % (ref 0–8)
ERYTHROCYTE [DISTWIDTH] IN BLOOD BY AUTOMATED COUNT: 14.1 % (ref 11.5–14.5)
EST. GFR  (AFRICAN AMERICAN): >60 ML/MIN/1.73 M^2
EST. GFR  (NON AFRICAN AMERICAN): >60 ML/MIN/1.73 M^2
GLUCOSE SERPL-MCNC: 83 MG/DL (ref 70–110)
HCT VFR BLD AUTO: 46.4 % (ref 40–54)
HGB BLD-MCNC: 15.7 G/DL (ref 14–18)
IMM GRANULOCYTES # BLD AUTO: 0.02 K/UL (ref 0–0.04)
IMM GRANULOCYTES NFR BLD AUTO: 0.3 % (ref 0–0.5)
LYMPHOCYTES # BLD AUTO: 2.4 K/UL (ref 1–4.8)
LYMPHOCYTES NFR BLD: 31.4 % (ref 18–48)
MCH RBC QN AUTO: 27.9 PG (ref 27–31)
MCHC RBC AUTO-ENTMCNC: 33.8 G/DL (ref 32–36)
MCV RBC AUTO: 82 FL (ref 82–98)
MONOCYTES # BLD AUTO: 0.9 K/UL (ref 0.3–1)
MONOCYTES NFR BLD: 11.6 % (ref 4–15)
NEUTROPHILS # BLD AUTO: 4 K/UL (ref 1.8–7.7)
NEUTROPHILS NFR BLD: 51.1 % (ref 38–73)
NRBC BLD-RTO: 0 /100 WBC
PLATELET # BLD AUTO: 204 K/UL (ref 150–450)
PMV BLD AUTO: 10.1 FL (ref 9.2–12.9)
POTASSIUM SERPL-SCNC: 4.3 MMOL/L (ref 3.5–5.1)
PROT SERPL-MCNC: 8 G/DL (ref 6–8.4)
RBC # BLD AUTO: 5.63 M/UL (ref 4.6–6.2)
SODIUM SERPL-SCNC: 143 MMOL/L (ref 136–145)
TROPONIN I SERPL DL<=0.01 NG/ML-MCNC: <0.006 NG/ML (ref 0–0.03)
WBC # BLD AUTO: 7.75 K/UL (ref 3.9–12.7)

## 2021-10-28 PROCEDURE — 93005 ELECTROCARDIOGRAM TRACING: CPT | Mod: HCNC

## 2021-10-28 PROCEDURE — 93010 EKG 12-LEAD: ICD-10-PCS | Mod: HCNC,,, | Performed by: INTERNAL MEDICINE

## 2021-10-28 PROCEDURE — 80053 COMPREHEN METABOLIC PANEL: CPT | Mod: HCNC | Performed by: EMERGENCY MEDICINE

## 2021-10-28 PROCEDURE — 83880 ASSAY OF NATRIURETIC PEPTIDE: CPT | Mod: HCNC | Performed by: EMERGENCY MEDICINE

## 2021-10-28 PROCEDURE — 84484 ASSAY OF TROPONIN QUANT: CPT | Mod: HCNC | Performed by: EMERGENCY MEDICINE

## 2021-10-28 PROCEDURE — 85025 COMPLETE CBC W/AUTO DIFF WBC: CPT | Mod: HCNC | Performed by: EMERGENCY MEDICINE

## 2021-10-28 PROCEDURE — 93010 ELECTROCARDIOGRAM REPORT: CPT | Mod: HCNC,,, | Performed by: INTERNAL MEDICINE

## 2021-10-28 PROCEDURE — 99285 EMERGENCY DEPT VISIT HI MDM: CPT | Mod: 25,HCNC

## 2021-10-28 RX ORDER — ACETAMINOPHEN 325 MG/1
650 TABLET ORAL EVERY 6 HOURS PRN
Qty: 21 TABLET | Refills: 0 | Status: SHIPPED | OUTPATIENT
Start: 2021-10-28 | End: 2022-09-16

## 2021-12-07 ENCOUNTER — OFFICE VISIT (OUTPATIENT)
Dept: FAMILY MEDICINE | Facility: CLINIC | Age: 66
End: 2021-12-07
Payer: MEDICARE

## 2021-12-07 VITALS
DIASTOLIC BLOOD PRESSURE: 80 MMHG | SYSTOLIC BLOOD PRESSURE: 130 MMHG | HEIGHT: 69 IN | RESPIRATION RATE: 16 BRPM | BODY MASS INDEX: 32.46 KG/M2 | HEART RATE: 68 BPM | TEMPERATURE: 98 F | OXYGEN SATURATION: 96 % | WEIGHT: 219.13 LBS

## 2021-12-07 DIAGNOSIS — I10 ESSENTIAL HYPERTENSION: Primary | ICD-10-CM

## 2021-12-07 DIAGNOSIS — Z12.5 SPECIAL SCREENING FOR MALIGNANT NEOPLASM OF PROSTATE: ICD-10-CM

## 2021-12-07 DIAGNOSIS — M79.10 MYALGIA: ICD-10-CM

## 2021-12-07 DIAGNOSIS — Z86.39 PERSONAL HISTORY OF OTHER ENDOCRINE, NUTRITIONAL AND METABOLIC DISEASE: ICD-10-CM

## 2021-12-07 DIAGNOSIS — E55.9 VITAMIN D INSUFFICIENCY: ICD-10-CM

## 2021-12-07 PROCEDURE — 99214 PR OFFICE/OUTPT VISIT, EST, LEVL IV, 30-39 MIN: ICD-10-PCS | Mod: HCNC,S$GLB,, | Performed by: FAMILY MEDICINE

## 2021-12-07 PROCEDURE — 99214 OFFICE O/P EST MOD 30 MIN: CPT | Mod: HCNC,S$GLB,, | Performed by: FAMILY MEDICINE

## 2021-12-07 PROCEDURE — 99999 PR PBB SHADOW E&M-EST. PATIENT-LVL III: CPT | Mod: PBBFAC,HCNC,, | Performed by: FAMILY MEDICINE

## 2021-12-07 PROCEDURE — 99499 UNLISTED E&M SERVICE: CPT | Mod: S$GLB,,, | Performed by: FAMILY MEDICINE

## 2021-12-07 PROCEDURE — 99499 RISK ADDL DX/OHS AUDIT: ICD-10-PCS | Mod: S$GLB,,, | Performed by: FAMILY MEDICINE

## 2021-12-07 PROCEDURE — 99999 PR PBB SHADOW E&M-EST. PATIENT-LVL III: ICD-10-PCS | Mod: PBBFAC,HCNC,, | Performed by: FAMILY MEDICINE

## 2021-12-07 RX ORDER — TIZANIDINE 4 MG/1
TABLET ORAL
Qty: 60 TABLET | Refills: 2 | Status: SHIPPED | OUTPATIENT
Start: 2021-12-07 | End: 2022-02-28

## 2021-12-07 RX ORDER — AMLODIPINE BESYLATE 10 MG/1
10 TABLET ORAL DAILY
Qty: 90 TABLET | Refills: 1 | Status: SHIPPED | OUTPATIENT
Start: 2021-12-07 | End: 2022-03-02 | Stop reason: SDUPTHER

## 2022-02-26 DIAGNOSIS — M79.10 MYALGIA: ICD-10-CM

## 2022-02-26 NOTE — TELEPHONE ENCOUNTER
No new care gaps identified.  Powered by [employer]. Reference number: [id number].   2/26/2022 7:00:59 AM CST

## 2022-02-28 ENCOUNTER — PATIENT MESSAGE (OUTPATIENT)
Dept: FAMILY MEDICINE | Facility: CLINIC | Age: 67
End: 2022-02-28
Payer: MEDICARE

## 2022-02-28 DIAGNOSIS — I10 ESSENTIAL HYPERTENSION: ICD-10-CM

## 2022-02-28 RX ORDER — TIZANIDINE 4 MG/1
TABLET ORAL
Qty: 180 TABLET | Refills: 2 | Status: SHIPPED | OUTPATIENT
Start: 2022-02-28

## 2022-02-28 RX ORDER — AMLODIPINE BESYLATE 10 MG/1
10 TABLET ORAL DAILY
Qty: 90 TABLET | Refills: 1 | Status: CANCELLED | OUTPATIENT
Start: 2022-02-28

## 2022-03-01 NOTE — TELEPHONE ENCOUNTER
No new care gaps identified.  Powered by [employer] by [email or web address]. Reference number: [id number].   2/28/2022 6:55:20 PM CST

## 2022-03-02 RX ORDER — AMLODIPINE BESYLATE 10 MG/1
10 TABLET ORAL DAILY
Qty: 90 TABLET | Refills: 1 | OUTPATIENT
Start: 2022-03-02

## 2022-03-02 RX ORDER — AMLODIPINE BESYLATE 10 MG/1
10 TABLET ORAL DAILY
Qty: 90 TABLET | Refills: 1 | Status: SHIPPED | OUTPATIENT
Start: 2022-03-02 | End: 2022-03-03 | Stop reason: SDUPTHER

## 2022-03-02 NOTE — TELEPHONE ENCOUNTER
No new care gaps identified.  Powered by [employer] by [employer]. Reference number: [id number].   3/02/2022 7:55:09 AM CST  
none

## 2022-03-03 DIAGNOSIS — I10 ESSENTIAL HYPERTENSION: ICD-10-CM

## 2022-03-03 RX ORDER — AMLODIPINE BESYLATE 10 MG/1
10 TABLET ORAL DAILY
Qty: 14 TABLET | Refills: 0 | Status: SHIPPED | OUTPATIENT
Start: 2022-03-03 | End: 2022-06-09 | Stop reason: SDUPTHER

## 2022-03-03 NOTE — TELEPHONE ENCOUNTER
No new care gaps identified.  Powered by [employer] by [employer] [place]. Reference number: [id number].   3/03/2022 3:00:33 PM CST

## 2022-03-17 RX ORDER — MELOXICAM 15 MG/1
15 TABLET ORAL DAILY
Qty: 90 TABLET | Refills: 1 | Status: SHIPPED | OUTPATIENT
Start: 2022-03-17 | End: 2023-01-31 | Stop reason: ALTCHOICE

## 2022-04-19 ENCOUNTER — PATIENT MESSAGE (OUTPATIENT)
Dept: FAMILY MEDICINE | Facility: CLINIC | Age: 67
End: 2022-04-19
Payer: MEDICARE

## 2022-04-27 ENCOUNTER — IMMUNIZATION (OUTPATIENT)
Dept: PRIMARY CARE CLINIC | Facility: CLINIC | Age: 67
End: 2022-04-27
Payer: MEDICARE

## 2022-04-27 DIAGNOSIS — Z23 NEED FOR VACCINATION: Primary | ICD-10-CM

## 2022-04-27 PROCEDURE — 91300 COVID-19, MRNA, LNP-S, PF, 30 MCG/0.3 ML DOSE VACCINE: CPT | Mod: PBBFAC | Performed by: INTERNAL MEDICINE

## 2022-05-02 ENCOUNTER — PATIENT MESSAGE (OUTPATIENT)
Dept: FAMILY MEDICINE | Facility: CLINIC | Age: 67
End: 2022-05-02
Payer: MEDICARE

## 2022-05-02 DIAGNOSIS — E78.5 HYPERLIPIDEMIA, ACQUIRED: ICD-10-CM

## 2022-05-03 RX ORDER — ATORVASTATIN CALCIUM 20 MG/1
20 TABLET, FILM COATED ORAL DAILY
Qty: 90 TABLET | Refills: 0 | Status: SHIPPED | OUTPATIENT
Start: 2022-05-03 | End: 2022-06-09 | Stop reason: SDUPTHER

## 2022-06-07 ENCOUNTER — LAB VISIT (OUTPATIENT)
Dept: LAB | Facility: HOSPITAL | Age: 67
End: 2022-06-07
Attending: FAMILY MEDICINE
Payer: MEDICARE

## 2022-06-07 DIAGNOSIS — Z86.39 PERSONAL HISTORY OF OTHER ENDOCRINE, NUTRITIONAL AND METABOLIC DISEASE: ICD-10-CM

## 2022-06-07 DIAGNOSIS — Z12.5 SPECIAL SCREENING FOR MALIGNANT NEOPLASM OF PROSTATE: ICD-10-CM

## 2022-06-07 DIAGNOSIS — E55.9 VITAMIN D INSUFFICIENCY: ICD-10-CM

## 2022-06-07 DIAGNOSIS — I10 ESSENTIAL HYPERTENSION: ICD-10-CM

## 2022-06-07 LAB
25(OH)D3+25(OH)D2 SERPL-MCNC: 62 NG/ML (ref 30–96)
ALBUMIN SERPL BCP-MCNC: 3.7 G/DL (ref 3.5–5.2)
ALP SERPL-CCNC: 64 U/L (ref 55–135)
ALT SERPL W/O P-5'-P-CCNC: 15 U/L (ref 10–44)
ANION GAP SERPL CALC-SCNC: 11 MMOL/L (ref 8–16)
AST SERPL-CCNC: 24 U/L (ref 10–40)
BASOPHILS # BLD AUTO: 0.04 K/UL (ref 0–0.2)
BASOPHILS NFR BLD: 0.7 % (ref 0–1.9)
BILIRUB SERPL-MCNC: 0.9 MG/DL (ref 0.1–1)
BUN SERPL-MCNC: 10 MG/DL (ref 8–23)
CALCIUM SERPL-MCNC: 9 MG/DL (ref 8.7–10.5)
CHLORIDE SERPL-SCNC: 105 MMOL/L (ref 95–110)
CHOLEST SERPL-MCNC: 148 MG/DL (ref 120–199)
CHOLEST/HDLC SERPL: 3.2 {RATIO} (ref 2–5)
CO2 SERPL-SCNC: 24 MMOL/L (ref 23–29)
COMPLEXED PSA SERPL-MCNC: 0.87 NG/ML (ref 0–4)
CREAT SERPL-MCNC: 1.1 MG/DL (ref 0.5–1.4)
DIFFERENTIAL METHOD: NORMAL
EOSINOPHIL # BLD AUTO: 0.1 K/UL (ref 0–0.5)
EOSINOPHIL NFR BLD: 2.1 % (ref 0–8)
ERYTHROCYTE [DISTWIDTH] IN BLOOD BY AUTOMATED COUNT: 13.6 % (ref 11.5–14.5)
EST. GFR  (AFRICAN AMERICAN): >60 ML/MIN/1.73 M^2
EST. GFR  (NON AFRICAN AMERICAN): >60 ML/MIN/1.73 M^2
ESTIMATED AVG GLUCOSE: 117 MG/DL (ref 68–131)
GLUCOSE SERPL-MCNC: 114 MG/DL (ref 70–110)
HBA1C MFR BLD: 5.7 % (ref 4–5.6)
HCT VFR BLD AUTO: 44.9 % (ref 40–54)
HDLC SERPL-MCNC: 46 MG/DL (ref 40–75)
HDLC SERPL: 31.1 % (ref 20–50)
HGB BLD-MCNC: 14.8 G/DL (ref 14–18)
IMM GRANULOCYTES # BLD AUTO: 0.01 K/UL (ref 0–0.04)
IMM GRANULOCYTES NFR BLD AUTO: 0.2 % (ref 0–0.5)
LDLC SERPL CALC-MCNC: 91.4 MG/DL (ref 63–159)
LYMPHOCYTES # BLD AUTO: 1.7 K/UL (ref 1–4.8)
LYMPHOCYTES NFR BLD: 27.6 % (ref 18–48)
MCH RBC QN AUTO: 27.7 PG (ref 27–31)
MCHC RBC AUTO-ENTMCNC: 33 G/DL (ref 32–36)
MCV RBC AUTO: 84 FL (ref 82–98)
MONOCYTES # BLD AUTO: 0.7 K/UL (ref 0.3–1)
MONOCYTES NFR BLD: 11.2 % (ref 4–15)
NEUTROPHILS # BLD AUTO: 3.6 K/UL (ref 1.8–7.7)
NEUTROPHILS NFR BLD: 58.2 % (ref 38–73)
NONHDLC SERPL-MCNC: 102 MG/DL
NRBC BLD-RTO: 0 /100 WBC
PLATELET # BLD AUTO: 217 K/UL (ref 150–450)
PMV BLD AUTO: 11.3 FL (ref 9.2–12.9)
POTASSIUM SERPL-SCNC: 3.4 MMOL/L (ref 3.5–5.1)
PROT SERPL-MCNC: 7 G/DL (ref 6–8.4)
RBC # BLD AUTO: 5.35 M/UL (ref 4.6–6.2)
SODIUM SERPL-SCNC: 140 MMOL/L (ref 136–145)
T4 FREE SERPL-MCNC: 0.98 NG/DL (ref 0.71–1.51)
TRIGL SERPL-MCNC: 53 MG/DL (ref 30–150)
TSH SERPL DL<=0.005 MIU/L-ACNC: 2.5 UIU/ML (ref 0.4–4)
WBC # BLD AUTO: 6.15 K/UL (ref 3.9–12.7)

## 2022-06-07 PROCEDURE — 36415 COLL VENOUS BLD VENIPUNCTURE: CPT | Mod: PO | Performed by: FAMILY MEDICINE

## 2022-06-07 PROCEDURE — 80061 LIPID PANEL: CPT | Performed by: FAMILY MEDICINE

## 2022-06-07 PROCEDURE — 84439 ASSAY OF FREE THYROXINE: CPT | Performed by: FAMILY MEDICINE

## 2022-06-07 PROCEDURE — 85025 COMPLETE CBC W/AUTO DIFF WBC: CPT | Performed by: FAMILY MEDICINE

## 2022-06-07 PROCEDURE — 83036 HEMOGLOBIN GLYCOSYLATED A1C: CPT | Performed by: FAMILY MEDICINE

## 2022-06-07 PROCEDURE — 80053 COMPREHEN METABOLIC PANEL: CPT | Performed by: FAMILY MEDICINE

## 2022-06-07 PROCEDURE — 82306 VITAMIN D 25 HYDROXY: CPT | Performed by: FAMILY MEDICINE

## 2022-06-07 PROCEDURE — 84153 ASSAY OF PSA TOTAL: CPT | Performed by: FAMILY MEDICINE

## 2022-06-07 PROCEDURE — 84443 ASSAY THYROID STIM HORMONE: CPT | Performed by: FAMILY MEDICINE

## 2022-06-09 ENCOUNTER — OFFICE VISIT (OUTPATIENT)
Dept: FAMILY MEDICINE | Facility: CLINIC | Age: 67
End: 2022-06-09
Payer: MEDICARE

## 2022-06-09 VITALS
BODY MASS INDEX: 32.49 KG/M2 | OXYGEN SATURATION: 97 % | RESPIRATION RATE: 16 BRPM | TEMPERATURE: 99 F | HEART RATE: 68 BPM | HEIGHT: 69 IN | WEIGHT: 219.38 LBS | DIASTOLIC BLOOD PRESSURE: 86 MMHG | SYSTOLIC BLOOD PRESSURE: 126 MMHG

## 2022-06-09 DIAGNOSIS — E78.5 HYPERLIPIDEMIA, ACQUIRED: ICD-10-CM

## 2022-06-09 DIAGNOSIS — R35.0 URINARY FREQUENCY: ICD-10-CM

## 2022-06-09 DIAGNOSIS — I10 ESSENTIAL HYPERTENSION: Primary | ICD-10-CM

## 2022-06-09 PROCEDURE — 1126F AMNT PAIN NOTED NONE PRSNT: CPT | Mod: CPTII,S$GLB,, | Performed by: FAMILY MEDICINE

## 2022-06-09 PROCEDURE — 99999 PR PBB SHADOW E&M-EST. PATIENT-LVL IV: CPT | Mod: PBBFAC,,, | Performed by: FAMILY MEDICINE

## 2022-06-09 PROCEDURE — 3074F SYST BP LT 130 MM HG: CPT | Mod: CPTII,S$GLB,, | Performed by: FAMILY MEDICINE

## 2022-06-09 PROCEDURE — 3044F PR MOST RECENT HEMOGLOBIN A1C LEVEL <7.0%: ICD-10-PCS | Mod: CPTII,S$GLB,, | Performed by: FAMILY MEDICINE

## 2022-06-09 PROCEDURE — 3288F PR FALLS RISK ASSESSMENT DOCUMENTED: ICD-10-PCS | Mod: CPTII,S$GLB,, | Performed by: FAMILY MEDICINE

## 2022-06-09 PROCEDURE — 99214 OFFICE O/P EST MOD 30 MIN: CPT | Mod: S$GLB,,, | Performed by: FAMILY MEDICINE

## 2022-06-09 PROCEDURE — 3074F PR MOST RECENT SYSTOLIC BLOOD PRESSURE < 130 MM HG: ICD-10-PCS | Mod: CPTII,S$GLB,, | Performed by: FAMILY MEDICINE

## 2022-06-09 PROCEDURE — 3008F BODY MASS INDEX DOCD: CPT | Mod: CPTII,S$GLB,, | Performed by: FAMILY MEDICINE

## 2022-06-09 PROCEDURE — 3079F DIAST BP 80-89 MM HG: CPT | Mod: CPTII,S$GLB,, | Performed by: FAMILY MEDICINE

## 2022-06-09 PROCEDURE — 99999 PR PBB SHADOW E&M-EST. PATIENT-LVL IV: ICD-10-PCS | Mod: PBBFAC,,, | Performed by: FAMILY MEDICINE

## 2022-06-09 PROCEDURE — 3044F HG A1C LEVEL LT 7.0%: CPT | Mod: CPTII,S$GLB,, | Performed by: FAMILY MEDICINE

## 2022-06-09 PROCEDURE — 3008F PR BODY MASS INDEX (BMI) DOCUMENTED: ICD-10-PCS | Mod: CPTII,S$GLB,, | Performed by: FAMILY MEDICINE

## 2022-06-09 PROCEDURE — 3079F PR MOST RECENT DIASTOLIC BLOOD PRESSURE 80-89 MM HG: ICD-10-PCS | Mod: CPTII,S$GLB,, | Performed by: FAMILY MEDICINE

## 2022-06-09 PROCEDURE — 1101F PR PT FALLS ASSESS DOC 0-1 FALLS W/OUT INJ PAST YR: ICD-10-PCS | Mod: CPTII,S$GLB,, | Performed by: FAMILY MEDICINE

## 2022-06-09 PROCEDURE — 1126F PR PAIN SEVERITY QUANTIFIED, NO PAIN PRESENT: ICD-10-PCS | Mod: CPTII,S$GLB,, | Performed by: FAMILY MEDICINE

## 2022-06-09 PROCEDURE — 1101F PT FALLS ASSESS-DOCD LE1/YR: CPT | Mod: CPTII,S$GLB,, | Performed by: FAMILY MEDICINE

## 2022-06-09 PROCEDURE — 99214 PR OFFICE/OUTPT VISIT, EST, LEVL IV, 30-39 MIN: ICD-10-PCS | Mod: S$GLB,,, | Performed by: FAMILY MEDICINE

## 2022-06-09 PROCEDURE — 3288F FALL RISK ASSESSMENT DOCD: CPT | Mod: CPTII,S$GLB,, | Performed by: FAMILY MEDICINE

## 2022-06-09 RX ORDER — AMLODIPINE BESYLATE 10 MG/1
10 TABLET ORAL DAILY
Qty: 90 TABLET | Refills: 1 | Status: SHIPPED | OUTPATIENT
Start: 2022-06-09 | End: 2023-01-10 | Stop reason: SDUPTHER

## 2022-06-09 RX ORDER — ATORVASTATIN CALCIUM 20 MG/1
20 TABLET, FILM COATED ORAL DAILY
Qty: 90 TABLET | Refills: 1 | Status: SHIPPED | OUTPATIENT
Start: 2022-06-09 | End: 2022-07-16

## 2022-06-09 NOTE — PROGRESS NOTES
"Ascencion Owens is a 66 y.o. male who presents today for an annual exam.  Any acute or chronic medical issues presented have been addressed and documented separately during this visit.       ROS  Review of Systems   Constitutional: Negative.  Negative for activity change, appetite change, chills, diaphoresis, fatigue, fever and unexpected weight change.   HENT: Negative.  Negative for congestion, ear pain, hearing loss, nosebleeds, postnasal drip, rhinorrhea, sinus pressure, sneezing, sore throat and trouble swallowing.    Eyes: Negative for pain and visual disturbance.   Respiratory: Negative for cough, choking and shortness of breath.    Cardiovascular: Positive for leg swelling (both ankles; intermittent). Negative for chest pain and palpitations.   Gastrointestinal: Negative for abdominal pain, constipation, diarrhea, nausea and vomiting.   Genitourinary: Positive for enuresis (occasional), frequency and urgency. Negative for decreased urine volume, difficulty urinating, dysuria, flank pain, genital sores, hematuria, penile discharge, penile pain, penile swelling, scrotal swelling and testicular pain.   Musculoskeletal: Negative.  Negative for arthralgias, back pain, gait problem, joint swelling and myalgias.   Skin: Negative.    Allergic/Immunologic: Negative for environmental allergies and food allergies.   Neurological: Negative.  Negative for dizziness, seizures, syncope, weakness, light-headedness and headaches.   Psychiatric/Behavioral: Negative.  Negative for confusion, decreased concentration, dysphoric mood and sleep disturbance. The patient is not nervous/anxious.      Physical Exam  Vitals:    06/09/22 0910   BP: 126/86   Pulse: 68   Resp: 16   Temp: 98.8 °F (37.1 °C)    Body mass index is 32.39 kg/m².  Weight: 99.5 kg (219 lb 5.7 oz)  Height: 5' 9" (175.3 cm)    Physical Exam  Constitutional:       General: He is not in acute distress.     Appearance: Normal appearance. He is well-developed. He " is not ill-appearing, toxic-appearing or diaphoretic.   HENT:      Head: Normocephalic and atraumatic.      Right Ear: Hearing, ear canal and external ear normal. No decreased hearing noted. No tenderness. No foreign body. No mastoid tenderness. Tympanic membrane is not injected, scarred, perforated, erythematous, retracted or bulging.      Left Ear: Hearing, ear canal and external ear normal. No decreased hearing noted. No tenderness. No foreign body. No mastoid tenderness. Tympanic membrane is not injected, scarred, perforated, erythematous, retracted or bulging.      Nose: Nose normal. No nasal deformity, septal deviation, laceration or rhinorrhea.      Right Sinus: No maxillary sinus tenderness or frontal sinus tenderness.      Left Sinus: No maxillary sinus tenderness or frontal sinus tenderness.      Mouth/Throat:      Mouth: No oral lesions.      Dentition: Normal dentition. Does not have dentures. No dental caries or dental abscesses.      Pharynx: Uvula midline. No oropharyngeal exudate or posterior oropharyngeal erythema.      Tonsils: No tonsillar abscesses.   Eyes:      General: Lids are normal. No scleral icterus.        Right eye: No foreign body or discharge.         Left eye: No foreign body or discharge.      Conjunctiva/sclera: Conjunctivae normal.      Right eye: No chemosis or exudate.     Left eye: No chemosis or exudate.     Pupils: Pupils are equal, round, and reactive to light.   Cardiovascular:      Rate and Rhythm: Normal rate and regular rhythm.      Heart sounds: Normal heart sounds, S1 normal and S2 normal. No murmur heard.    No friction rub. No gallop.   Pulmonary:      Effort: Pulmonary effort is normal. No accessory muscle usage or respiratory distress.      Breath sounds: Normal breath sounds. No decreased breath sounds, wheezing, rhonchi or rales.   Abdominal:      General: There is no distension.      Palpations: Abdomen is soft. Abdomen is not rigid.      Tenderness: There is no  abdominal tenderness. There is no guarding or rebound.   Musculoskeletal:         General: Normal range of motion.      Cervical back: Full passive range of motion without pain, normal range of motion and neck supple. No rigidity.      Right lower leg: Swelling present. No deformity, lacerations, tenderness or bony tenderness. Edema present.      Left lower leg: Swelling present. No deformity, lacerations, tenderness or bony tenderness. Edema present.   Lymphadenopathy:      Head:      Right side of head: No submental, submandibular, tonsillar, preauricular or posterior auricular adenopathy.      Left side of head: No submental, submandibular, tonsillar, preauricular or posterior auricular adenopathy.   Skin:     General: Skin is warm and dry.      Findings: No rash.   Neurological:      Mental Status: He is alert and oriented to person, place, and time.      Cranial Nerves: No cranial nerve deficit.      Sensory: No sensory deficit.      Motor: No abnormal muscle tone or seizure activity.      Coordination: Coordination normal.      Gait: Gait normal.   Psychiatric:         Attention and Perception: He is attentive.         Speech: Speech normal.         Behavior: Behavior normal. Behavior is cooperative.         Thought Content: Thought content normal.         Judgment: Judgment normal.     Assessment & Plan     1. Essential hypertension  Patient was counseled and encouraged to maintain a low sodium diet, as well as increasing physical activity.  Recommend random BP checks at home on a regular basis.  Repeat BP at end of visit was not necessary. Will continue medication at this time, and follow up in 3-6 months, or sooner if blood pressure begins to increase.     - amLODIPine (NORVASC) 10 MG tablet; Take 1 tablet (10 mg total) by mouth once daily.  Dispense: 90 tablet; Refill: 1    2. Hyperlipidemia, acquired  We discussed ways to manage cholesterol levels, including increasing whole grain foods and decreasing  fried and fatty foods.  I also recommended OTC Omega 3 and Omega 6 supplements to improve overall cholesterol levels.  Will continue current therapy at this visit and will monitor lipids appropriately.   - atorvastatin (LIPITOR) 20 MG tablet; Take 1 tablet (20 mg total) by mouth once daily.  Dispense: 90 tablet; Refill: 1       Follow up in about 6 months (around 12/9/2022).     ACTIVE MEDICAL ISSUES:  Documented in Problem List    PAST MEDICAL HISTORY  Documented    PAST SURGICAL HISTORY:  Documented    SOCIAL HISTORY:  Documented    FAMILY HISTORY:  Documented    ALLERGIES AND MEDICATIONS: updated and reviewed.  Documented    Health Maintenance       Date Due Completion Date    Pneumococcal Vaccines (Age 65+) (1 - PCV) 10/19/2020 9/23/2017    Abdominal Aortic Aneurysm Screening Never done ---    Colorectal Cancer Screening 06/10/2025 6/10/2015    Lipid Panel 06/07/2027 6/7/2022    TETANUS VACCINE 10/02/2029 10/2/2019

## 2022-07-16 DIAGNOSIS — E78.5 HYPERLIPIDEMIA, ACQUIRED: ICD-10-CM

## 2022-07-16 RX ORDER — ATORVASTATIN CALCIUM 20 MG/1
TABLET, FILM COATED ORAL
Qty: 90 TABLET | Refills: 3 | Status: SHIPPED | OUTPATIENT
Start: 2022-07-16 | End: 2024-02-01 | Stop reason: SDUPTHER

## 2022-07-16 NOTE — TELEPHONE ENCOUNTER
No new care gaps identified.  [facility] Embedded Care Gaps. Reference number: [id number]. 7/16/2022   2:44:49 AM JENNYT

## 2022-07-16 NOTE — TELEPHONE ENCOUNTER
Refill Decision Note   Ascencion Owens  is requesting a refill authorization.  Brief Assessment and Rationale for Refill:  Approve     Medication Therapy Plan:       Medication Reconciliation Completed: No   Comments:     No Care Gaps recommended.     Note composed:3:36 PM 07/16/2022

## 2022-07-21 ENCOUNTER — PATIENT MESSAGE (OUTPATIENT)
Dept: UROLOGY | Facility: CLINIC | Age: 67
End: 2022-07-21
Payer: MEDICARE

## 2022-07-22 ENCOUNTER — LAB VISIT (OUTPATIENT)
Dept: LAB | Facility: HOSPITAL | Age: 67
End: 2022-07-22
Attending: STUDENT IN AN ORGANIZED HEALTH CARE EDUCATION/TRAINING PROGRAM
Payer: MEDICARE

## 2022-07-22 ENCOUNTER — OFFICE VISIT (OUTPATIENT)
Dept: UROLOGY | Facility: CLINIC | Age: 67
End: 2022-07-22
Payer: MEDICARE

## 2022-07-22 VITALS — WEIGHT: 216.5 LBS | BODY MASS INDEX: 31.97 KG/M2

## 2022-07-22 DIAGNOSIS — R35.0 URINARY FREQUENCY: Primary | ICD-10-CM

## 2022-07-22 DIAGNOSIS — R35.0 URINARY FREQUENCY: ICD-10-CM

## 2022-07-22 LAB
BACTERIA #/AREA URNS HPF: NORMAL /HPF
BILIRUB UR QL STRIP: NEGATIVE
CAOX CRY URNS QL MICRO: NORMAL
CLARITY UR: ABNORMAL
COLOR UR: YELLOW
GLUCOSE UR QL STRIP: NEGATIVE
HGB UR QL STRIP: NEGATIVE
KETONES UR QL STRIP: NEGATIVE
LEUKOCYTE ESTERASE UR QL STRIP: NEGATIVE
MICROSCOPIC COMMENT: NORMAL
NITRITE UR QL STRIP: NEGATIVE
PH UR STRIP: 6 [PH] (ref 5–8)
PROT UR QL STRIP: NEGATIVE
SP GR UR STRIP: 1.02 (ref 1–1.03)
URN SPEC COLLECT METH UR: ABNORMAL
UROBILINOGEN UR STRIP-ACNC: NEGATIVE EU/DL

## 2022-07-22 PROCEDURE — 1159F PR MEDICATION LIST DOCUMENTED IN MEDICAL RECORD: ICD-10-PCS | Mod: CPTII,S$GLB,, | Performed by: STUDENT IN AN ORGANIZED HEALTH CARE EDUCATION/TRAINING PROGRAM

## 2022-07-22 PROCEDURE — 3044F PR MOST RECENT HEMOGLOBIN A1C LEVEL <7.0%: ICD-10-PCS | Mod: CPTII,S$GLB,, | Performed by: STUDENT IN AN ORGANIZED HEALTH CARE EDUCATION/TRAINING PROGRAM

## 2022-07-22 PROCEDURE — 3288F FALL RISK ASSESSMENT DOCD: CPT | Mod: CPTII,S$GLB,, | Performed by: STUDENT IN AN ORGANIZED HEALTH CARE EDUCATION/TRAINING PROGRAM

## 2022-07-22 PROCEDURE — 1159F MED LIST DOCD IN RCRD: CPT | Mod: CPTII,S$GLB,, | Performed by: STUDENT IN AN ORGANIZED HEALTH CARE EDUCATION/TRAINING PROGRAM

## 2022-07-22 PROCEDURE — 1126F PR PAIN SEVERITY QUANTIFIED, NO PAIN PRESENT: ICD-10-PCS | Mod: CPTII,S$GLB,, | Performed by: STUDENT IN AN ORGANIZED HEALTH CARE EDUCATION/TRAINING PROGRAM

## 2022-07-22 PROCEDURE — 99203 OFFICE O/P NEW LOW 30 MIN: CPT | Mod: S$GLB,,, | Performed by: STUDENT IN AN ORGANIZED HEALTH CARE EDUCATION/TRAINING PROGRAM

## 2022-07-22 PROCEDURE — 1126F AMNT PAIN NOTED NONE PRSNT: CPT | Mod: CPTII,S$GLB,, | Performed by: STUDENT IN AN ORGANIZED HEALTH CARE EDUCATION/TRAINING PROGRAM

## 2022-07-22 PROCEDURE — 1160F RVW MEDS BY RX/DR IN RCRD: CPT | Mod: CPTII,S$GLB,, | Performed by: STUDENT IN AN ORGANIZED HEALTH CARE EDUCATION/TRAINING PROGRAM

## 2022-07-22 PROCEDURE — 81000 URINALYSIS NONAUTO W/SCOPE: CPT | Performed by: STUDENT IN AN ORGANIZED HEALTH CARE EDUCATION/TRAINING PROGRAM

## 2022-07-22 PROCEDURE — 3288F PR FALLS RISK ASSESSMENT DOCUMENTED: ICD-10-PCS | Mod: CPTII,S$GLB,, | Performed by: STUDENT IN AN ORGANIZED HEALTH CARE EDUCATION/TRAINING PROGRAM

## 2022-07-22 PROCEDURE — 1101F PT FALLS ASSESS-DOCD LE1/YR: CPT | Mod: CPTII,S$GLB,, | Performed by: STUDENT IN AN ORGANIZED HEALTH CARE EDUCATION/TRAINING PROGRAM

## 2022-07-22 PROCEDURE — 99999 PR PBB SHADOW E&M-EST. PATIENT-LVL III: CPT | Mod: PBBFAC,,, | Performed by: STUDENT IN AN ORGANIZED HEALTH CARE EDUCATION/TRAINING PROGRAM

## 2022-07-22 PROCEDURE — 99999 PR PBB SHADOW E&M-EST. PATIENT-LVL III: ICD-10-PCS | Mod: PBBFAC,,, | Performed by: STUDENT IN AN ORGANIZED HEALTH CARE EDUCATION/TRAINING PROGRAM

## 2022-07-22 PROCEDURE — 3008F BODY MASS INDEX DOCD: CPT | Mod: CPTII,S$GLB,, | Performed by: STUDENT IN AN ORGANIZED HEALTH CARE EDUCATION/TRAINING PROGRAM

## 2022-07-22 PROCEDURE — 1101F PR PT FALLS ASSESS DOC 0-1 FALLS W/OUT INJ PAST YR: ICD-10-PCS | Mod: CPTII,S$GLB,, | Performed by: STUDENT IN AN ORGANIZED HEALTH CARE EDUCATION/TRAINING PROGRAM

## 2022-07-22 PROCEDURE — 3008F PR BODY MASS INDEX (BMI) DOCUMENTED: ICD-10-PCS | Mod: CPTII,S$GLB,, | Performed by: STUDENT IN AN ORGANIZED HEALTH CARE EDUCATION/TRAINING PROGRAM

## 2022-07-22 PROCEDURE — 3044F HG A1C LEVEL LT 7.0%: CPT | Mod: CPTII,S$GLB,, | Performed by: STUDENT IN AN ORGANIZED HEALTH CARE EDUCATION/TRAINING PROGRAM

## 2022-07-22 PROCEDURE — 99203 PR OFFICE/OUTPT VISIT, NEW, LEVL III, 30-44 MIN: ICD-10-PCS | Mod: S$GLB,,, | Performed by: STUDENT IN AN ORGANIZED HEALTH CARE EDUCATION/TRAINING PROGRAM

## 2022-07-22 PROCEDURE — 1160F PR REVIEW ALL MEDS BY PRESCRIBER/CLIN PHARMACIST DOCUMENTED: ICD-10-PCS | Mod: CPTII,S$GLB,, | Performed by: STUDENT IN AN ORGANIZED HEALTH CARE EDUCATION/TRAINING PROGRAM

## 2022-07-22 RX ORDER — TAMSULOSIN HYDROCHLORIDE 0.4 MG/1
0.4 CAPSULE ORAL DAILY
Qty: 30 CAPSULE | Refills: 11 | Status: SHIPPED | OUTPATIENT
Start: 2022-07-22 | End: 2022-09-16

## 2022-07-22 NOTE — PROGRESS NOTES
Patient ID: Ascencion Owens is a 66 y.o. male.    Chief Complaint: Urinary Frequency (Occasional frequency)    Referral: Azikiwe K. Lombard, MD  [id number] [place]  [place] [id number]     [facility]  66 y.o. who presents to the Urology clinic for evaluation of urinary frequency for the past 2-3 years. Patient denies dysuria, hematuria, flank pain, nausea or vomiting. Patient notes when he exerts himself physically his symptoms are limited. He drinks 3 x 16 oz bottles of water daily. He drinks 1 cup of caffeine daily. Denies constipation. Denies hx of prostatitis/UTI. Patient has not been previously treated for this condition. Patient denies ED. Notes urinary urgency. Nocturia  2-5 x per night. Notes significant stress and sleepless night.IPSS [id number], MIXED.   Medically Necessary ROS documented in HPI    Past Medical History  Active Ambulatory Problems     Diagnosis Date Noted    Essential hypertension 11/09/2012    Hyperlipidemia, acquired 11/09/2012    History of pulmonary embolism 11/09/2012    Obesity 09/11/2013    History of cluster headache 02/25/2014    Body mass index 33.0-33.9, adult 02/02/2015    Acute idiopathic gout involving toe of left foot 02/02/2018    Pinguecula of both eyes 05/23/2018     Resolved Ambulatory Problems     Diagnosis Date Noted    No Resolved Ambulatory Problems     Past Medical History:   Diagnosis Date    Acute bronchitis     Arthritis     CKD (chronic kidney disease)     Cluster headache     Dyslipidemia     Essential hypertension, benign     Hypercholesteremia          Past Surgical History  Past Surgical History:   Procedure Laterality Date    APPENDECTOMY      COLONOSCOPY  2004    lt ankle surgery  1973       Social History  Social Connections: Unknown    Frequency of Communication with Friends and Family: More than three times a week    Frequency of Social Gatherings with Friends and Family: Once a week    Attends Voodoo Services: Not on file    Active  Member of Clubs or Organizations: Yes    Attends Club or Organization Meetings: More than 4 times per year    Marital Status:        Medications    Current Outpatient Medications:     acetaminophen (TYLENOL) 325 MG tablet, Take 2 tablets (650 mg total) by mouth every 6 (six) hours as needed., Disp: 21 tablet, Rfl: 0    amLODIPine (NORVASC) 10 MG tablet, Take 1 tablet (10 mg total) by mouth once daily., Disp: 90 tablet, Rfl: 1    aspirin (ECOTRIN) 81 MG EC tablet, Take 81 mg by mouth once daily., Disp: , Rfl:     atorvastatin (LIPITOR) 20 MG tablet, TAKE 1 TABLET EVERY DAY, Disp: 90 tablet, Rfl: 3    FLUCELVAX QUAD [id number], PF, 60 mcg (15 mcg x 4)/0.5 mL Syrg, ADM 0.5ML IM UTD, Disp: , Rfl:     meloxicam (MOBIC) 15 MG tablet, Take 1 tablet (15 mg total) by mouth once daily., Disp: 90 tablet, Rfl: 1    tiZANidine (ZANAFLEX) 4 MG tablet, TAKE 1 TO 2 TABLETS BY MOUTH NIGHTLY AS NEEDED FOR PAIN, Disp: 180 tablet, Rfl: 2    ketoconazole (NIZORAL) 2 % cream, Apply topically once daily., Disp: 60 g, Rfl: 0    tamsulosin (FLOMAX) 0.4 mg Cap, Take 1 capsule (0.4 mg total) by mouth once daily., Disp: 30 capsule, Rfl: 11    Allergies  Review of patient's allergies indicates:   Allergen Reactions    Perfume Rash     Allergic to certain cologne fragrance - rash certain area of the body        Patient's PMH, FH, Social hx, Medications, allergies reviewed and updated as pertinent to today's visit    Objective:      Physical Exam  Constitutional:       General: He is not in acute distress.     Appearance: He is well-developed. He is not ill-appearing, toxic-appearing or diaphoretic.   HENT:      Head: Normocephalic and atraumatic.      Mouth/Throat:      Mouth: Mucous membranes are moist.   Eyes:      Conjunctiva/sclera: Conjunctivae normal.   Cardiovascular:      Rate and Rhythm: Normal rate and regular rhythm.   Pulmonary:      Effort: Pulmonary effort is normal. No respiratory distress.   Abdominal:       General: There is no distension.      Palpations: Abdomen is soft. There is no mass.      Tenderness: There is no abdominal tenderness. There is no guarding.   Musculoskeletal:         General: No swelling or deformity.      Cervical back: Neck supple.   Skin:     General: Skin is warm.      Capillary Refill: Capillary refill takes less than 2 seconds.      Findings: No rash.   Neurological:      Mental Status: He is alert and oriented to person, place, and time.      Gait: Gait normal.   Psychiatric:         Mood and Affect: Mood normal.         Thought Content: Thought content normal.         Judgment: Judgment normal.               Assessment:       1. Urinary frequency        Plan:         Discussed pathophysiology and differential of LUTS including BPH/ infection   POCT UA  Consider PFPT, referral placed  Flomax trial, discussed side effects, retrograde ejaculation  Can consider OAB meds in the future   RTC 8 weeks

## 2022-07-26 ENCOUNTER — PATIENT OUTREACH (OUTPATIENT)
Dept: ADMINISTRATIVE | Facility: HOSPITAL | Age: 67
End: 2022-07-26
Payer: MEDICARE

## 2022-08-24 ENCOUNTER — CLINICAL SUPPORT (OUTPATIENT)
Dept: REHABILITATION | Facility: OTHER | Age: 67
End: 2022-08-24
Attending: STUDENT IN AN ORGANIZED HEALTH CARE EDUCATION/TRAINING PROGRAM
Payer: MEDICARE

## 2022-08-24 DIAGNOSIS — R35.0 URINARY FREQUENCY: ICD-10-CM

## 2022-08-24 DIAGNOSIS — R27.9 LACK OF COORDINATION: ICD-10-CM

## 2022-08-24 PROCEDURE — 97112 NEUROMUSCULAR REEDUCATION: CPT

## 2022-08-24 PROCEDURE — 97161 PT EVAL LOW COMPLEX 20 MIN: CPT

## 2022-08-25 PROBLEM — R27.9 LACK OF COORDINATION: Status: ACTIVE | Noted: 2022-08-25

## 2022-08-25 NOTE — PLAN OF CARE
"[facility]  Pelvic Health Physical Therapy Initial Evaluation    Date: 8/24/2022   Name: Ascencion Owens  Clinic Number: [id number]  Therapy Diagnosis:   Encounter Diagnosis   Name Primary?    Urinary frequency      Physician: Teresa Porter MD    Physician Orders: PT Eval and Treat  Medical Diagnosis from Referral: urinary frequency  Evaluation Date: 8/24/2022  Authorization Period Expiration: 1 visit  Plan of Care Expiration: 11/22/22  Visit # / Visits authorized: 1/ 1    Time In: 320  Time Out: 405  Total Appointment Time (timed & untimed codes): 45 minutes    Precautions: universal    Subjective     Date of onset: 10 years ago, gradually worsened and became more bothersome over the past year    History of current condition - Ascencion reports: Gradual onset of urinary frequency and nocturia. Symptoms fluctuate. Has noticed better after days he goes on long walks. Worse during times of high stress. Has periods of not sleeping well and will get up more those nights. Cannot tell if its urge that wakes him up or that he is not sleeping well and gets up. Was prescribed Flomax, seemed helpful initially, but couldn't sleep so stopped taking it.    Surgical History: no  Sexually active currently?  Yes, reduced  Pain with sexual activity?  No  Able to attain erection?:  Yes  Able to maintain erection?:  Yes  Able to attain orgasm?  Yes    Bladder/Bowel History:    Frequency of urination:   Daytime: every 45 minutes           Nighttime: sometimes 1, "bad night" 5-6x   Difficulty initiating urine stream: No   Urine stream: weak and strong - varies, morning time can be weak   Complete emptying: not sure   Push to empty bladder: No   Bladder leakage: Yes   Frequency of incidents/Type of incontinence: inconsistent, UUI   Amount leaked (urine): small squirt    Urinary Urgency: Yes   Frequency of bowel movements: once a day   Difficulty initiating BM: Yes   Does Patient Feel Empty after BM? " Yes   Fiber Supplements or Laxative Use? activia    Pain:  Location: R hip soreness, past 1-2weeks, no JYOTHI he can remember     Medical History: Ascencion  has a past medical history of Acute bronchitis, Arthritis, CKD (chronic kidney disease), Cluster headache, Dyslipidemia, Essential hypertension, benign, Hypercholesteremia, and Obesity.     Surgical History: Ascencion Owens  has a past surgical history that includes Appendectomy; lt ankle surgery (1973); and Colonoscopy (2004).    Medications: Ascencion has a current medication list which includes the following prescription(s): acetaminophen, amlodipine, aspirin, atorvastatin, flucelvax quad [id number] (pf), ketoconazole, meloxicam, tamsulosin, and tizanidine.    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, sitting    Types of fluid intake: water - 32-48 oz, coffee - 10 oz, and juice/mixed w water  Diet: TBA     Abuse/Neglect: No     Pts goals: to reduce frequency and be able to sleep better    OBJECTIVE     See EMR under MEDIA for written consent provided 8/24/2022  Chaperone: Declined    ORTHO SCREEN  Posture in sitting: sacral sitting  Posture in standing: posterior pelvic tilt   Pelvic alignment: no sign of deviations noted in supine   SLS: fair  Lumbar ROM: mod hanson    ABDOMINAL WALL ASSESSMENT  Palpation: WNL  Abdominal strength: fair  Scarring: none  Pelvic Floor Muscle and Transverse Abdominus Synergy: absent    BREATHING MECHANICS ASSESSMENT   Thorax Assessment During Quiet Respiration: Decreased excursion of abdominal wall  and Decreased excursion bilaterally of lateral ribs    Thorax Assessment During Deep Respiration: Decreased excursion of abdominal wall  and Decreased excursion bilaterally of lateral ribs      PELVIC FLOOR EXAM    EXTERNAL ASSESSMENT    Comments: TAUS of pelvic floor mm performed, difficulty with complete deactivation noted, subsequent contractions lead to elevated tension. Downtraining initiated with good performance.     TREATMENT     Treatment Time In: 350  Treatment Time Out: 405  Total Treatment time (time-based codes) separate from Evaluation: 15 minutes    Neuromuscular Re-education to develop Coordination, Control and Down training for 10 minutes including:   RUSI for breathing, drops, contractions of the PFM to help pt visualize PFM motion and function. and 360 breathing     Therapeutic Activity Patient participated in dynamic functional therapeutic activities to improve functional performance for 5 minutes. Including: Education as described below.     Patient Education provided:   general anatomy/physiology of urinary/ bowel  system and benefits of treatment were discussed with the pt. Additionally, anatomy/physiology of pelvic floor and diaphragmatic breathing were reviewed.     Home Exercises provided:  Written Home Exercises provided: Yes  Exercises were reviewed and Ascencion was able to demonstrate them prior to the end of the session.    Ascencion demonstrated good  understanding of the education provided.     See EMR under Patient Instructions for exercises provided 8/24/2022.    Assessment     Ascencion is a 66 y.o. male referred to outpatient Physical Therapy with a medical diagnosis of urinary frequency. Pt presents with altered posture, poor knowledge of body mechanics and posture, poor trunk stability, increased tension of the pelvic muscles, poor quality of pelvic muscle contraction, increased frequency of urination, increased nocturia, poor fluid intake, dysfunctional voiding and unable to co-contract or co-relax abdominal wall and pelvic floor  muscles. Pelvic exam completed externally with RUSI due to time constraints. Did note difficulty with complete relaxation following contractions and suspect pelvic floor mm overactivity. Patient also reports connection between stress and symptoms. Will address mm overactivity, lumbopelvic stability, bladder habits and stress mgmt.      Pt prognosis is Excellent  Pt will benefit from skilled outpatient Physical Therapy to address the deficits stated above and in the chart below, provide pt/family education, and to maximize pt's level of independence.     Plan of care discussed with patient: Yes  Pt's spiritual, cultural and educational needs considered and patient is agreeable to the plan of care and goals as stated below:     Anticipated Barriers for therapy: None    Medical Necessity is demonstrated by the following:    History  Co-morbidities and personal factors that may impact the plan of care Co-morbidities   none    Personal Factors  no deficits     low   Examination  Body structures and functions, activity limitations and participation restrictions that may impact the plan of care Body Regions/Systems/Functions:  altered posture, poor knowledge of body mechanics and posture, poor trunk stability, increased tension of the pelvic muscles, poor quality of pelvic muscle contraction, increased frequency of urination, increased nocturia, poor fluid intake, dysfunctional voiding and unable to co-contract or co-relax abdominal wall and pelvic floor muscles.        Activity Limitations:  urgency , delaying urge to urinate and sleep uninterrupted by excessive nocturia    Participation Restrictions:  all ADLs/iADLs uninterrupted by urinary incontinence/urgency/frequency    Activity limitations:   Learning and applying knowledge  No deficits    General Tasks and Commands  No deficits    Communication  No deficits    Mobility  No deficits    Self care  No deficits    Domestic Life  No  deficits    Interactions/Relationships  No deficits    Life Areas  No deficits    Community and Social Life  No deficits       high   Clinical Presentation evolving clinical presentation with changing clinical characteristics moderate   Decision Making/ Complexity Score: moderate       Goals:  Short Term Goals: 4 weeks   Pt indep in HEP  Pt able to wait at least 2 hours between trips to the bathroom for improved participation in ADLs  Pt demo pelvic floor mm strength at least 3/5 for improved continence and support of pelvic organs  Nocturia no more than 3x/night for improved quality of sleep    Long Term Goals: 8 weeks   Pt indep in progressive HEP  Pt reports 0 episodes of leakage in at least 2 weeks for improved ADL participation  Pt able to wait at least 2.5-4 hours between trips to the bathroom   Nocturia no more than 1x/night for improved quality of sleep  Pt demo pelvic floor mm strength at least 3+/5 for improved continence and support of pelvic organs      Plan     Plan of care Certification: 8/24/2022 to 11/22/22.    Outpatient Physical Therapy 1 times weekly for 12 weeks to include the following interventions: therapeutic exercises, therapeutic activity, neuromuscular re-education, manual therapy, patient/family education and self care/home management    Cornelia Driver, PT

## 2022-08-25 NOTE — PATIENT INSTRUCTIONS
Home Program 8/24/22:        360 Breath - Inhale long, slow and deep. You should feel as if your lower ribs are expanding out to the sides. Your belly, back and sides should also gently expand and you may notice a relaxation in the pelvic floor.     Continue to breath like this for 10 breaths. Repeat 3 times/day.

## 2022-09-03 ENCOUNTER — PATIENT MESSAGE (OUTPATIENT)
Dept: REHABILITATION | Facility: OTHER | Age: 67
End: 2022-09-03
Payer: MEDICARE

## 2022-09-03 ENCOUNTER — PATIENT MESSAGE (OUTPATIENT)
Dept: UROLOGY | Facility: CLINIC | Age: 67
End: 2022-09-03
Payer: MEDICARE

## 2022-09-14 ENCOUNTER — IMMUNIZATION (OUTPATIENT)
Dept: INTERNAL MEDICINE | Facility: CLINIC | Age: 67
End: 2022-09-14
Payer: MEDICARE

## 2022-09-14 ENCOUNTER — CLINICAL SUPPORT (OUTPATIENT)
Dept: REHABILITATION | Facility: OTHER | Age: 67
End: 2022-09-14
Attending: STUDENT IN AN ORGANIZED HEALTH CARE EDUCATION/TRAINING PROGRAM
Payer: MEDICARE

## 2022-09-14 DIAGNOSIS — R27.9 LACK OF COORDINATION: Primary | ICD-10-CM

## 2022-09-14 DIAGNOSIS — Z23 NEED FOR VACCINATION: Primary | ICD-10-CM

## 2022-09-14 PROCEDURE — 91312 COVID-19, MRNA, LNP-S, BIVALENT BOOSTER, PF, 30 MCG/0.3 ML DOSE: ICD-10-PCS | Mod: S$GLB,,, | Performed by: INTERNAL MEDICINE

## 2022-09-14 PROCEDURE — 91312 COVID-19, MRNA, LNP-S, BIVALENT BOOSTER, PF, 30 MCG/0.3 ML DOSE: CPT | Mod: S$GLB,,, | Performed by: INTERNAL MEDICINE

## 2022-09-14 PROCEDURE — 97530 THERAPEUTIC ACTIVITIES: CPT

## 2022-09-14 PROCEDURE — 97112 NEUROMUSCULAR REEDUCATION: CPT

## 2022-09-14 NOTE — PATIENT INSTRUCTIONS
Home Program 9/14/22:        360 Breath - Inhale long, slow and deep. You should feel as if your lower ribs are expanding out to the sides. Your belly, back and sides should also gently expand and you may notice a relaxation in the pelvic floor.     Inhale for 4 seconds, exhale for 6 seconds.    Continue to breath like this for 12 breaths. Repeat 3 times/day.

## 2022-09-14 NOTE — PROGRESS NOTES
Pelvic Health Physical Therapy   Treatment Note     Name: Ascencion Owens  Clinic Number: [id number]    Therapy Diagnosis:   Encounter Diagnosis   Name Primary?    Lack of coordination Yes     Physician: Teresa Porter MD    Visit Date: 9/14/2022    Physician Orders: PT Eval and Treat  Medical Diagnosis from Referral: urinary frequency  Evaluation Date: 8/24/2022  Authorization Period Expiration: 6 visits 9/8/22 until 12/14/22  Plan of Care Expiration: 11/22/22  Visit # / Visits authorized: 1/ 6    Cancelled Visits: 0  No Show Visits: 0  FOTO: 1    Time In: 905  Time Out: 1018  Total Billable Time: 73 minutes    Precautions: Standard    Subjective     Pt reports: Did not do the exercise. Barriers: not sure if doing it right, not feeling like it was doing anything afterwards, and remembering. Has decided who should start documenting everything - fluid intake, when he voids, Most nights this past week got up 3-6x. Has noticed a few ounces make a big difference. Yesterday drank 72 ounces of fluid. Suspects connection between blood sugar and symptoms. Concerned for getting DMII. Has family history but does not yet have it.   He was compliant with home exercise program.  Response to previous treatment: felt fine  Functional change: no change    Pain:     Objective   Pt verbally consents to intrarectal treatment today.  Signed consent form already on file.    Ascencion received therapeutic exercises to develop  strength, endurance, ROM, flexibility, posture, and core stabilization for 00 minutes including:  not performed today    Ascencion received the following manual therapy techniques: to develop flexibility and extensibility for 5 minutes including: myofascial release of suprapubic fascia    Ascencion participated in neuromuscular re-education activities to develop Coordination, Control, Down training, and Proprioception for 28 minutes including: RUSI for breathing, drops, contractions of the PFM to help pt visualize  PFM motion and function., 360 breathing - practiced 4 sec inhale/6 sec exhalation, and pelvic floor mm contractions focus on activation at 3 layers sequentially in isolation and then sequentially    Ascencion participated in dynamic functional therapeutic activities to improve functional performance for 40 minutes, including:  Prescription of bladder diaries  Discussion of bladder norms and habits  Discussion regarding sexual health and role of PFM in sexual health and urinary health  Explanation about pelvic floor mm overactivity and how could be contributing to incomplete emptying, urgency and frequency and importance of breathing - how it works mechanically for PFM relaxation as well as to stimulate increased parasympathetic activity     Home Exercises Provided and Patient Education Provided     Education provided:   - anatomy/physiology of pelvic floor and diaphragmatic breathing  Discussed progression of plan of care with patient; educated pt in activity modification; reviewed HEP with pt. Pt demonstrated and verbalized understanding of all instruction and was provided with a handout of HEP (see Patient Instructions).    Written Home Exercises Provided: yes.  Exercises were reviewed and Ascencion was able to demonstrate them prior to the end of the session.  Ascencion demonstrated good  understanding of the education provided.     See EMR under Patient Instructions for exercises provided 9/14/2022.    Assessment   Pt returns for first f/u visit after evaluation. Reviewed and reinforced his home program of pelvic floor mm relaxation strategies with breathing and reinforced no contractions at this time. Assessed pelvic floor mm with TAUS. Confirmed complete bladder emptying immediately post-void. With contractions continues poor coordination and incomplete relaxation but attains good lengthening with breath. He will work on breathing only at home. Prescribed bladder diaries for further evaluation of patterns, habits  and symptoms and education will be provided as appropriate. Would like to do further evaluation of pelvic floor mm with external palpation of superficial mm and potentially intrarectal exam, to be completed at future sessions.   Ascencion Is progressing well towards his goals.   Pt prognosis is Excellent.     Pt will continue to benefit from skilled outpatient physical therapy to address the deficits listed in the problem list box on initial evaluation, provide pt/family education and to maximize pt's level of independence in the home and community environment.     Pt's spiritual, cultural and educational needs considered and pt agreeable to plan of care and goals.     Anticipated barriers to physical therapy: none    Goals: Short Term Goals: 4 weeks   Pt indep in HEP  Pt able to wait at least 2 hours between trips to the bathroom for improved participation in ADLs  Pt demo pelvic floor mm strength at least 3/5 for improved continence and support of pelvic organs  Nocturia no more than 3x/night for improved quality of sleep     Long Term Goals: 8 weeks   Pt indep in progressive HEP  Pt reports 0 episodes of leakage in at least 2 weeks for improved ADL participation  Pt able to wait at least 2.5-4 hours between trips to the bathroom   Nocturia no more than 1x/night for improved quality of sleep  Pt demo pelvic floor mm strength at least 3+/5 for improved continence and support of pelvic organs        Plan      Plan of care Certification: 8/24/2022 to 11/22/22.     Outpatient Physical Therapy 1 times weekly for 12 weeks to include the following interventions: therapeutic exercises, therapeutic activity, neuromuscular re-education, manual therapy, patient/family education and self care/home management    Next visit: pelvic floor mm assessment, bladder diary review, review breathing    oCrnelia Driver, PT

## 2022-09-16 ENCOUNTER — OFFICE VISIT (OUTPATIENT)
Dept: UROLOGY | Facility: CLINIC | Age: 67
End: 2022-09-16
Payer: MEDICARE

## 2022-09-16 VITALS — WEIGHT: 220.69 LBS | BODY MASS INDEX: 32.69 KG/M2 | HEIGHT: 69 IN

## 2022-09-16 DIAGNOSIS — R35.0 URINARY FREQUENCY: Primary | ICD-10-CM

## 2022-09-16 PROCEDURE — 1159F MED LIST DOCD IN RCRD: CPT | Mod: CPTII,S$GLB,, | Performed by: STUDENT IN AN ORGANIZED HEALTH CARE EDUCATION/TRAINING PROGRAM

## 2022-09-16 PROCEDURE — 1160F RVW MEDS BY RX/DR IN RCRD: CPT | Mod: CPTII,S$GLB,, | Performed by: STUDENT IN AN ORGANIZED HEALTH CARE EDUCATION/TRAINING PROGRAM

## 2022-09-16 PROCEDURE — 1160F PR REVIEW ALL MEDS BY PRESCRIBER/CLIN PHARMACIST DOCUMENTED: ICD-10-PCS | Mod: CPTII,S$GLB,, | Performed by: STUDENT IN AN ORGANIZED HEALTH CARE EDUCATION/TRAINING PROGRAM

## 2022-09-16 PROCEDURE — 1126F AMNT PAIN NOTED NONE PRSNT: CPT | Mod: CPTII,S$GLB,, | Performed by: STUDENT IN AN ORGANIZED HEALTH CARE EDUCATION/TRAINING PROGRAM

## 2022-09-16 PROCEDURE — 3288F PR FALLS RISK ASSESSMENT DOCUMENTED: ICD-10-PCS | Mod: CPTII,S$GLB,, | Performed by: STUDENT IN AN ORGANIZED HEALTH CARE EDUCATION/TRAINING PROGRAM

## 2022-09-16 PROCEDURE — 1101F PR PT FALLS ASSESS DOC 0-1 FALLS W/OUT INJ PAST YR: ICD-10-PCS | Mod: CPTII,S$GLB,, | Performed by: STUDENT IN AN ORGANIZED HEALTH CARE EDUCATION/TRAINING PROGRAM

## 2022-09-16 PROCEDURE — 3288F FALL RISK ASSESSMENT DOCD: CPT | Mod: CPTII,S$GLB,, | Performed by: STUDENT IN AN ORGANIZED HEALTH CARE EDUCATION/TRAINING PROGRAM

## 2022-09-16 PROCEDURE — 1101F PT FALLS ASSESS-DOCD LE1/YR: CPT | Mod: CPTII,S$GLB,, | Performed by: STUDENT IN AN ORGANIZED HEALTH CARE EDUCATION/TRAINING PROGRAM

## 2022-09-16 PROCEDURE — 1126F PR PAIN SEVERITY QUANTIFIED, NO PAIN PRESENT: ICD-10-PCS | Mod: CPTII,S$GLB,, | Performed by: STUDENT IN AN ORGANIZED HEALTH CARE EDUCATION/TRAINING PROGRAM

## 2022-09-16 PROCEDURE — 99213 OFFICE O/P EST LOW 20 MIN: CPT | Mod: S$GLB,,, | Performed by: STUDENT IN AN ORGANIZED HEALTH CARE EDUCATION/TRAINING PROGRAM

## 2022-09-16 PROCEDURE — 99999 PR PBB SHADOW E&M-EST. PATIENT-LVL III: CPT | Mod: PBBFAC,,, | Performed by: STUDENT IN AN ORGANIZED HEALTH CARE EDUCATION/TRAINING PROGRAM

## 2022-09-16 PROCEDURE — 3008F BODY MASS INDEX DOCD: CPT | Mod: CPTII,S$GLB,, | Performed by: STUDENT IN AN ORGANIZED HEALTH CARE EDUCATION/TRAINING PROGRAM

## 2022-09-16 PROCEDURE — 3044F HG A1C LEVEL LT 7.0%: CPT | Mod: CPTII,S$GLB,, | Performed by: STUDENT IN AN ORGANIZED HEALTH CARE EDUCATION/TRAINING PROGRAM

## 2022-09-16 PROCEDURE — 1159F PR MEDICATION LIST DOCUMENTED IN MEDICAL RECORD: ICD-10-PCS | Mod: CPTII,S$GLB,, | Performed by: STUDENT IN AN ORGANIZED HEALTH CARE EDUCATION/TRAINING PROGRAM

## 2022-09-16 PROCEDURE — 3044F PR MOST RECENT HEMOGLOBIN A1C LEVEL <7.0%: ICD-10-PCS | Mod: CPTII,S$GLB,, | Performed by: STUDENT IN AN ORGANIZED HEALTH CARE EDUCATION/TRAINING PROGRAM

## 2022-09-16 PROCEDURE — 99213 PR OFFICE/OUTPT VISIT, EST, LEVL III, 20-29 MIN: ICD-10-PCS | Mod: S$GLB,,, | Performed by: STUDENT IN AN ORGANIZED HEALTH CARE EDUCATION/TRAINING PROGRAM

## 2022-09-16 PROCEDURE — 99999 PR PBB SHADOW E&M-EST. PATIENT-LVL III: ICD-10-PCS | Mod: PBBFAC,,, | Performed by: STUDENT IN AN ORGANIZED HEALTH CARE EDUCATION/TRAINING PROGRAM

## 2022-09-16 PROCEDURE — 3008F PR BODY MASS INDEX (BMI) DOCUMENTED: ICD-10-PCS | Mod: CPTII,S$GLB,, | Performed by: STUDENT IN AN ORGANIZED HEALTH CARE EDUCATION/TRAINING PROGRAM

## 2022-09-16 NOTE — PROGRESS NOTES
Patient ID: Ascencion Owens is a 66 y.o. male.    Chief Complaint: Follow-up  Urinary frequency follow up     HPI  66 y.o. who presents to the Urology clinic for evaluation of urinary frequency, nocturia. Patient with baseline sleep disturbances, no formal testing for apnea. Patient discontinued flomax due to concerns of insomnia. Denies dysuria, hematuria, nausea, vomiting, flank pain. He is working with PFPT to conservatively manage his symptoms. He is working on a bladder diary. No hx of UTI. No hx of retention.     Medically Necessary ROS documented in HPI    66 y.o. who presents to the Urology clinic for evaluation of urinary frequency for the past 2-3 years. Patient denies dysuria, hematuria, flank pain, nausea or vomiting. Patient notes when he exerts himself physically his symptoms are limited. He drinks 3 x 16 oz bottles of water daily. He drinks 1 cup of caffeine daily. Denies constipation. Denies hx of prostatitis/UTI. Patient has not been previously treated for this condition. Patient denies ED. Notes urinary urgency. Nocturia  2-5 x per night. Notes significant stress and sleepless night.IPSS [id number], MIXED.     Past Medical History  Active Ambulatory Problems     Diagnosis Date Noted    Essential hypertension 11/09/2012    Hyperlipidemia, acquired 11/09/2012    History of pulmonary embolism 11/09/2012    Obesity 09/11/2013    History of cluster headache 02/25/2014    Body mass index 33.0-33.9, adult 02/02/2015    Acute idiopathic gout involving toe of left foot 02/02/2018    Pinguecula of both eyes 05/23/2018    Lack of coordination 08/25/2022     Resolved Ambulatory Problems     Diagnosis Date Noted    No Resolved Ambulatory Problems     Past Medical History:   Diagnosis Date    Acute bronchitis     Arthritis     CKD (chronic kidney disease)     Cluster headache     Dyslipidemia     Essential hypertension, benign     Hypercholesteremia          Past Surgical History  Past Surgical History:    Procedure Laterality Date    APPENDECTOMY      COLONOSCOPY  2004    lt ankle surgery  1973       Social History  Social Connections: Unknown    Frequency of Communication with Friends and Family: More than three times a week    Frequency of Social Gatherings with Friends and Family: Once a week    Attends [employer] Services: Not on file    Active Member of Clubs or Organizations: Yes    Attends Club or Organization Meetings: More than 4 times per year    Marital Status:        Medications    Current Outpatient Medications:     acetaminophen (TYLENOL) 325 MG tablet, Take 2 tablets (650 mg total) by mouth every 6 (six) hours as needed., Disp: 21 tablet, Rfl: 0    amLODIPine (NORVASC) 10 MG tablet, Take 1 tablet (10 mg total) by mouth once daily., Disp: 90 tablet, Rfl: 1    aspirin (ECOTRIN) 81 MG EC tablet, Take 81 mg by mouth once daily., Disp: , Rfl:     atorvastatin (LIPITOR) 20 MG tablet, TAKE 1 TABLET EVERY DAY, Disp: 90 tablet, Rfl: 3    FLUCELVAX QUAD 8747-2191, PF, 60 mcg (15 mcg x 4)/0.5 mL Syrg, ADM 0.5ML IM UTD, Disp: , Rfl:     ketoconazole (NIZORAL) 2 % cream, Apply topically once daily., Disp: 60 g, Rfl: 0    meloxicam (MOBIC) 15 MG tablet, Take 1 tablet (15 mg total) by mouth once daily., Disp: 90 tablet, Rfl: 1    tamsulosin (FLOMAX) 0.4 mg Cap, Take 1 capsule (0.4 mg total) by mouth once daily., Disp: 30 capsule, Rfl: 11    tiZANidine (ZANAFLEX) 4 MG tablet, TAKE 1 TO 2 TABLETS BY MOUTH NIGHTLY AS NEEDED FOR PAIN, Disp: 180 tablet, Rfl: 2    Allergies  Review of patient's allergies indicates:   Allergen Reactions    Perfume Rash     Allergic to certain cologne fragrance - rash certain area of the body        Patient's PMH, FH, Social hx, Medications, allergies reviewed and updated as pertinent to today's visit    Objective:      Physical Exam  Nursing note reviewed.   Constitutional:       General: He is not in acute distress.     Appearance: He is well-developed. He is not ill-appearing,  toxic-appearing or diaphoretic.   HENT:      Head: Normocephalic and atraumatic.      Mouth/Throat:      Mouth: Mucous membranes are moist.   Eyes:      Conjunctiva/sclera: Conjunctivae normal.   Pulmonary:      Effort: Pulmonary effort is normal. No respiratory distress.   Abdominal:      General: There is no distension.      Palpations: Abdomen is soft. There is no mass.      Tenderness: There is no abdominal tenderness. There is no guarding.   Musculoskeletal:         General: No swelling or deformity.      Cervical back: Neck supple.   Skin:     General: Skin is warm.      Capillary Refill: Capillary refill takes less than 2 seconds.      Findings: No rash.   Neurological:      Mental Status: He is alert and oriented to person, place, and time.      Gait: Gait normal.   Psychiatric:         Mood and Affect: Mood normal.         Thought Content: Thought content normal.         Judgment: Judgment normal.       PSA 0.87    Assessment:       No diagnosis found.    Plan:       Nocturia  Did not tolerate flomax  Follow up after PFPT completion  Can consider RBUS/Cysto vs OAB meds if symptoms not resolved to satisfaction

## 2022-10-04 ENCOUNTER — CLINICAL SUPPORT (OUTPATIENT)
Dept: REHABILITATION | Facility: OTHER | Age: 67
End: 2022-10-04
Attending: STUDENT IN AN ORGANIZED HEALTH CARE EDUCATION/TRAINING PROGRAM
Payer: MEDICARE

## 2022-10-04 DIAGNOSIS — R27.9 LACK OF COORDINATION: Primary | ICD-10-CM

## 2022-10-04 PROCEDURE — 97530 THERAPEUTIC ACTIVITIES: CPT

## 2022-10-04 PROCEDURE — 97112 NEUROMUSCULAR REEDUCATION: CPT

## 2022-10-04 NOTE — PROGRESS NOTES
Pelvic Health Physical Therapy   Treatment Note     Name: Ascencion Owens  Clinic Number: [id number]    Therapy Diagnosis:   Encounter Diagnosis   Name Primary?    Lack of coordination Yes     Physician: Teresa Porter MD    Visit Date: 10/4/2022    Physician Orders: PT Eval and Treat  Medical Diagnosis from Referral: urinary frequency  Evaluation Date: 2022  Authorization Period Expiration: 6 visits 22 until 22  Plan of Care Expiration: 22  Visit # / Visits authorized: 2/     Cancelled Visits: 0  No Show Visits: 0  FOTO: 1    Time In: 1105  Time Out: 1200  Total Billable Time: 55 minutes    Precautions: Standard    Subjective     Pt reports: Thinks he is seeing some effect of the exercises with fewer urges. Only doing exercises 60-70% of the time. Notices bigger change during the day, no change at night. Nocturia 3-5.   He was compliant with home exercise program.  Response to previous treatment: felt fine  Functional change: no change    Objective   Pt verbally consents to intrarectal treatment today.  Signed consent form already on file.    Ascencion received therapeutic exercises to develop  strength, endurance, ROM, flexibility, posture, and core stabilization for 00 minutes including:  not performed today    Ascencion received the following manual therapy techniques: to develop flexibility and extensibility for 5 minutes including: myofascial release of pelvic floor mm anteriorly    Ascencion participated in neuromuscular re-education activities to develop Coordination, Control, Down training, and Proprioception for 10 minutes includin breathing - practiced 4 sec inhale/6 sec exhalation    Ascencion participated in dynamic functional therapeutic activities to improve functional performance for 45 minutes, including:  Review of bladder diaries - role of bladder irritants, rec amount of water - trial of elimination of bladder irritants  Strategies for improving sleep    Home Exercises  Provided and Patient Education Provided     Education provided:   - anatomy/physiology of pelvic floor and diaphragmatic breathing  Discussed progression of plan of care with patient; educated pt in activity modification; reviewed HEP with pt. Pt demonstrated and verbalized understanding of all instruction and was provided with a handout of HEP (see Patient Instructions).    Written Home Exercises Provided: yes.  Exercises were reviewed and Ascencion was able to demonstrate them prior to the end of the session.  Ascencion demonstrated good  understanding of the education provided.     See EMR under Patient Instructions for exercises provided 9/14/2022.    Assessment   Rectal exam completed. Pelvic floor mm appear in normal rest position, with good activation and deactivation and no TTP. Trial of MFR to anterior levator ani just to see if effects symptoms. Bladder diaries reveal low water intake and consumption of irritants. Discussed trial of reducing irritants to one morning cup of coffee and replacing with one extra bottle of water/day for one week to monitor effect on symptoms. Also, suspect poor sleep quality contributing to nocturia and recommended strategies for improving sleep. Do not believe pelvic floor mm tension in contributing to symptoms, but will continue to progress nervous system regulation strategies to assist with sleep quality and reducing nocturia.  Ascencion Is progressing well towards his goals.   Pt prognosis is Excellent.     Pt will continue to benefit from skilled outpatient physical therapy to address the deficits listed in the problem list box on initial evaluation, provide pt/family education and to maximize pt's level of independence in the home and community environment.     Pt's spiritual, cultural and educational needs considered and pt agreeable to plan of care and goals.     Anticipated barriers to physical therapy: none    Goals: Short Term Goals: 4 weeks   Pt indep in HEP  Pt able to  wait at least 2 hours between trips to the bathroom for improved participation in ADLs  Pt demo pelvic floor mm strength at least 3/5 for improved continence and support of pelvic organs  Nocturia no more than 3x/night for improved quality of sleep     Long Term Goals: 8 weeks   Pt indep in progressive HEP  Pt reports 0 episodes of leakage in at least 2 weeks for improved ADL participation  Pt able to wait at least 2.5-4 hours between trips to the bathroom   Nocturia no more than 1x/night for improved quality of sleep  Pt demo pelvic floor mm strength at least 3+/5 for improved continence and support of pelvic organs        Plan      Plan of care Certification: 8/24/2022 to 11/22/22.     Outpatient Physical Therapy 1 times weekly for 12 weeks to include the following interventions: therapeutic exercises, therapeutic activity, neuromuscular re-education, manual therapy, patient/family education and self care/home management    Next visit: CNS calming strategies, review irritant elimination    Cornelia Driver, PT

## 2022-10-05 ENCOUNTER — PATIENT MESSAGE (OUTPATIENT)
Dept: FAMILY MEDICINE | Facility: CLINIC | Age: 67
End: 2022-10-05
Payer: MEDICARE

## 2022-10-06 DIAGNOSIS — R73.03 PREDIABETES: ICD-10-CM

## 2022-10-06 DIAGNOSIS — I10 ESSENTIAL HYPERTENSION: Primary | ICD-10-CM

## 2022-10-06 NOTE — TELEPHONE ENCOUNTER
Please let pt know that I have ordered labs prior to his visit. Please let him know that I am happy to see him, but I will be transferring to [facility] at the beginning of the year.

## 2022-11-03 ENCOUNTER — CLINICAL SUPPORT (OUTPATIENT)
Dept: REHABILITATION | Facility: OTHER | Age: 67
End: 2022-11-03
Attending: STUDENT IN AN ORGANIZED HEALTH CARE EDUCATION/TRAINING PROGRAM
Payer: MEDICARE

## 2022-11-03 DIAGNOSIS — R27.9 LACK OF COORDINATION: Primary | ICD-10-CM

## 2022-11-03 PROCEDURE — 97530 THERAPEUTIC ACTIVITIES: CPT

## 2022-11-03 NOTE — PROGRESS NOTES
Pelvic Health Physical Therapy   Recertification Note     Name: Ascencion Owens  Clinic Number: [id number]    Therapy Diagnosis:   Encounter Diagnosis   Name Primary?    Lack of coordination Yes     Physician: Teresa Porter MD    Visit Date: 11/3/2022    Physician Orders: PT Eval and Treat  Medical Diagnosis from Referral: urinary frequency  Evaluation Date: 8/24/2022  Authorization Period Expiration: 6 visits 9/8/22 until 12/14/22  Plan of Care Expiration: 11/3/22 to 2/1/23  Visit # / Visits authorized: 3/ 6    Cancelled Visits: 0  No Show Visits: 0  FOTO: 1    Time In: 205  Time Out: 300  Total Billable Time: 55 minutes    Precautions: Standard    Subjective     Pt reports: Had a terrible night earlier this week, with getting up 5-6x. Has noticed connection between irritants and nocturia, particularly sugar and carbs. Night he got up 5-6x had had cake that day. For past few days has been avoiding processed sugars and eating fruit to satisfy cravings. It seems to have gotten better. Has also been doing circuit training classes and sleeps really well on those nights. A good night for him is getting up 2x. Once at 3a and second time at 6-7. Currently doing circuit training 1x/week but wants to increase to two times a week.   He was compliant with home exercise program.  Response to previous treatment: felt fine  Functional change: no change    Objective   Pt verbally consents to intrarectal treatment today.  Signed consent form already on file.    Ascencion received therapeutic exercises to develop  strength, endurance, ROM, flexibility, posture, and core stabilization for 00 minutes including:  not performed today    Ascencion received the following manual therapy techniques: to develop flexibility and extensibility for 00 minutes including: myofascial release of pelvic floor mm anteriorly    Ascencion participated in neuromuscular re-education activities to develop Coordination, Control, Down training, and  Proprioception for 0 minutes includin breathing - practiced 4 sec inhale/6 sec exhalation    Ascencion participated in dynamic functional therapeutic activities to improve functional performance for 55 minutes, including:  Discussion of role of foods and beverages with sugar and high glycemin index carbs on symptoms  Discussed plan of tracking food intake, exercise and symptoms  Reviewed home program of pelvic relaxation exercises    Home Exercises Provided and Patient Education Provided     Education provided:   - anatomy/physiology of pelvic floor and diaphragmatic breathing  Discussed progression of plan of care with patient; educated pt in activity modification; reviewed HEP with pt. Pt demonstrated and verbalized understanding of all instruction and was provided with a handout of HEP (see Patient Instructions).    Written Home Exercises Provided: yes.  Exercises were reviewed and Ascencion was able to demonstrate them prior to the end of the session.  Ascencion demonstrated good  understanding of the education provided.     See EMR under Patient Instructions for exercises provided 2022.    Assessment   Pt is progressing well. While pelvic exam did not reveal significantly elevated tension or TTP he did report increased urinary flow after myofascial release. Will continue to integrate into his POC to see if has additional benefit. He is also noting biggest improvements with reduced nighttime frequency with monitoring of irritant intake, most notably sugar. He will track his symptoms, sugar intake and exercise until next session to see if pattern continues. Next session in one month and then will resume weekly visits at that time. Certification period being extended to allow for ongoing care. While he does still have occasional night of high frequency up to 5-6x, his good nights are greatly reduced to 2x. Believe with ongoing care will have continued improvement.   Ascencion Is progressing well towards his  goals.   Pt prognosis is Excellent.     Pt will continue to benefit from skilled outpatient physical therapy to address the deficits listed in the problem list box on initial evaluation, provide pt/family education and to maximize pt's level of independence in the home and community environment.     Pt's spiritual, cultural and educational needs considered and pt agreeable to plan of care and goals.     Anticipated barriers to physical therapy: none    Goals: Short Term Goals: 4 weeks   Pt indep in HEP MET  Pt able to wait at least 2 hours between trips to the bathroom for improved participation in ADLs MET  Pt demo pelvic floor mm strength at least 3/5 for improved continence and support of pelvic organs MET  Nocturia no more than 3x/night for improved quality of sleep PROGRESSING     Long Term Goals: 8 weeks   Pt indep in progressive HEP PROGRESSING  Pt reports 0 episodes of leakage in at least 2 weeks for improved ADL participation PROGRESSING  Pt able to wait at least 2.5-4 hours between trips to the bathroom  PROGRESSING  Nocturia no more than 1x/night for improved quality of sleep PROGRESSING  Pt demo pelvic floor mm strength at least 3+/5 for improved continence and support of pelvic organs PROGRESSING        Plan      Plan of care Certification: 11/3/22 to 2/1/23     Outpatient Physical Therapy 1 times weekly for 12 weeks to include the following interventions: therapeutic exercises, therapeutic activity, neuromuscular re-education, manual therapy, patient/family education and self care/home management    Next visit: pelvic floor myofascial release, TAUS for abd/PF connection    Cornelia Driver, PT

## 2022-12-07 ENCOUNTER — LAB VISIT (OUTPATIENT)
Dept: LAB | Facility: HOSPITAL | Age: 67
End: 2022-12-07
Attending: STUDENT IN AN ORGANIZED HEALTH CARE EDUCATION/TRAINING PROGRAM
Payer: MEDICARE

## 2022-12-07 DIAGNOSIS — R73.03 PREDIABETES: ICD-10-CM

## 2022-12-07 DIAGNOSIS — I10 ESSENTIAL HYPERTENSION: ICD-10-CM

## 2022-12-07 LAB
ANION GAP SERPL CALC-SCNC: 10 MMOL/L (ref 8–16)
BUN SERPL-MCNC: 12 MG/DL (ref 8–23)
CALCIUM SERPL-MCNC: 9.3 MG/DL (ref 8.7–10.5)
CHLORIDE SERPL-SCNC: 104 MMOL/L (ref 95–110)
CO2 SERPL-SCNC: 26 MMOL/L (ref 23–29)
CREAT SERPL-MCNC: 1.1 MG/DL (ref 0.5–1.4)
EST. GFR  (NO RACE VARIABLE): >60 ML/MIN/1.73 M^2
ESTIMATED AVG GLUCOSE: 120 MG/DL (ref 68–131)
GLUCOSE SERPL-MCNC: 95 MG/DL (ref 70–110)
HBA1C MFR BLD: 5.8 % (ref 4–5.6)
POTASSIUM SERPL-SCNC: 3.8 MMOL/L (ref 3.5–5.1)
SODIUM SERPL-SCNC: 140 MMOL/L (ref 136–145)

## 2022-12-07 PROCEDURE — 80048 BASIC METABOLIC PNL TOTAL CA: CPT | Performed by: STUDENT IN AN ORGANIZED HEALTH CARE EDUCATION/TRAINING PROGRAM

## 2022-12-07 PROCEDURE — 36415 COLL VENOUS BLD VENIPUNCTURE: CPT | Mod: PO | Performed by: STUDENT IN AN ORGANIZED HEALTH CARE EDUCATION/TRAINING PROGRAM

## 2022-12-07 PROCEDURE — 83036 HEMOGLOBIN GLYCOSYLATED A1C: CPT | Performed by: STUDENT IN AN ORGANIZED HEALTH CARE EDUCATION/TRAINING PROGRAM

## 2022-12-08 ENCOUNTER — CLINICAL SUPPORT (OUTPATIENT)
Dept: REHABILITATION | Facility: OTHER | Age: 67
End: 2022-12-08
Attending: STUDENT IN AN ORGANIZED HEALTH CARE EDUCATION/TRAINING PROGRAM
Payer: MEDICARE

## 2022-12-08 ENCOUNTER — PATIENT MESSAGE (OUTPATIENT)
Dept: FAMILY MEDICINE | Facility: CLINIC | Age: 67
End: 2022-12-08
Payer: MEDICARE

## 2022-12-08 ENCOUNTER — TELEPHONE (OUTPATIENT)
Dept: FAMILY MEDICINE | Facility: CLINIC | Age: 67
End: 2022-12-08
Payer: MEDICARE

## 2022-12-08 DIAGNOSIS — R27.9 LACK OF COORDINATION: Primary | ICD-10-CM

## 2022-12-08 PROCEDURE — 97530 THERAPEUTIC ACTIVITIES: CPT

## 2022-12-08 PROCEDURE — 97140 MANUAL THERAPY 1/> REGIONS: CPT

## 2022-12-08 PROCEDURE — 97110 THERAPEUTIC EXERCISES: CPT

## 2022-12-08 NOTE — TELEPHONE ENCOUNTER
----- Message from Amanda Viveros MD sent at 12/8/2022  9:37 AM CST -----  Please let pt know that his A1c is unchanged from 6  months ago.

## 2022-12-08 NOTE — PROGRESS NOTES
Pelvic Health Physical Therapy   Treatment Note     Name: Ascencion Owens  Clinic Number: [id number]    Therapy Diagnosis:   Encounter Diagnosis   Name Primary?    Lack of coordination Yes     Physician: Teresa Porter MD    Visit Date: 2022    Physician Orders: PT Eval and Treat  Medical Diagnosis from Referral: urinary frequency  Evaluation Date: 2022  Authorization Period Expiration: 6 visits 22 until 22  Plan of Care Expiration: 11/3/22 to 23  Visit # / Visits authorized:     Cancelled Visits: 0  No Show Visits: 0  FOTO: 1    Time In: 110 (pt arrived late)  Time Out:  200  Total Billable Time: 50 minutes    Precautions: Standard    Subjective     Pt reports: Not sleeping well and sugar are definitely triggers. Few days without sugar over Thanksgiving and those were the best.   He was compliant with home exercise program.  Response to previous treatment: felt fine  Functional change: no change    Objective   Pt verbally consents to intrarectal treatment today.  Signed consent form already on file.    Ascencion received therapeutic exercises to develop  strength, endurance, ROM, flexibility, posture, and core stabilization for 20 minutes including:   PPU  Supine adductor stretch  Supine figure-4  Happy baby    Ascencion received the following manual therapy techniques: to develop flexibility and extensibility for 25 minutes including: myofascial release of pelvic floor mm  intrarectally    Ascencion participated in neuromuscular re-education activities to develop Coordination, Control, Down training, and Proprioception for 0 minutes includin breathing - practiced 4 sec inhale/6 sec exhalation    Ascencion participated in dynamic functional therapeutic activities to improve functional performance for 10 minutes, including:  Review of sugar as irritants and benefits of exercise for symptoms reduction    Home Exercises Provided and Patient Education Provided     Education provided:    - anatomy/physiology of pelvic floor and diaphragmatic breathing  Discussed progression of plan of care with patient; educated pt in activity modification; reviewed HEP with pt. Pt demonstrated and verbalized understanding of all instruction and was provided with a handout of HEP (see Patient Instructions).    Written Home Exercises Provided: yes.  Exercises were reviewed and Ascencion was able to demonstrate them prior to the end of the session.  Ascencion demonstrated good  understanding of the education provided.     See EMR under Patient Instructions for exercises provided 9/14/2022.    Assessment   Trial of MFR performed again and he will monitor benefit. Reviewed and progressed his hip and pelvic stretching and reinforced monitoring sugar intake and trying to increase exercise for benefits on decreasing nighttime voiding. Will benefit from hip stabilization.  Ascencion Is progressing well towards his goals.   Pt prognosis is Excellent.     Pt will continue to benefit from skilled outpatient physical therapy to address the deficits listed in the problem list box on initial evaluation, provide pt/family education and to maximize pt's level of independence in the home and community environment.     Pt's spiritual, cultural and educational needs considered and pt agreeable to plan of care and goals.     Anticipated barriers to physical therapy: none    Goals: Short Term Goals: 4 weeks   Pt indep in HEP MET  Pt able to wait at least 2 hours between trips to the bathroom for improved participation in ADLs MET  Pt demo pelvic floor mm strength at least 3/5 for improved continence and support of pelvic organs MET  Nocturia no more than 3x/night for improved quality of sleep PROGRESSING     Long Term Goals: 8 weeks   Pt indep in progressive HEP PROGRESSING  Pt reports 0 episodes of leakage in at least 2 weeks for improved ADL participation PROGRESSING  Pt able to wait at least 2.5-4 hours between trips to the bathroom   PROGRESSING  Nocturia no more than 1x/night for improved quality of sleep PROGRESSING  Pt demo pelvic floor mm strength at least 3+/5 for improved continence and support of pelvic organs PROGRESSING        Plan      Plan of care Certification: 11/3/22 to 2/1/23     Outpatient Physical Therapy 1 times weekly for 12 weeks to include the following interventions: therapeutic exercises, therapeutic activity, neuromuscular re-education, manual therapy, patient/family education and self care/home management    Next visit: pelvic floor myofascial release, TAUS for abd/PF connection, hip strengthening, stretching    Cornelia Driver, PT

## 2022-12-14 ENCOUNTER — TELEPHONE (OUTPATIENT)
Dept: FAMILY MEDICINE | Facility: CLINIC | Age: 67
End: 2022-12-14
Payer: MEDICARE

## 2022-12-14 NOTE — TELEPHONE ENCOUNTER
Called patient and informed patient Dr. Viveros will be leaving the clinic. Patient states he will call his insurance to see which provider he should see

## 2022-12-14 NOTE — TELEPHONE ENCOUNTER
----- Message from Sarita Elena sent at 12/14/2022 11:19 AM CST -----  Regarding: Request for Sooner Apt  Type:  Sooner Appointment Request    Patient is requesting a sooner appointment.  Patient declined first available appointment listed as well as another facility and provider .  Patient will not accept being placed on the waitlist and is requesting a message be sent to doctor.    Name of Caller: Self     When is the first available appointment? No other apts available     Symptoms: He has an annual scheduled and would like a call to get a sooner apt. He is cancelling today because of weather.     Would the patient rather a call back or a response via [employer]? Call     Best Call Back Number: .[phone]

## 2022-12-22 ENCOUNTER — CLINICAL SUPPORT (OUTPATIENT)
Dept: REHABILITATION | Facility: OTHER | Age: 67
End: 2022-12-22
Attending: FAMILY MEDICINE
Payer: MEDICARE

## 2022-12-22 DIAGNOSIS — R27.9 LACK OF COORDINATION: Primary | ICD-10-CM

## 2022-12-22 PROCEDURE — 97140 MANUAL THERAPY 1/> REGIONS: CPT | Mod: HCNC

## 2022-12-22 PROCEDURE — 97110 THERAPEUTIC EXERCISES: CPT | Mod: HCNC

## 2022-12-22 PROCEDURE — 97112 NEUROMUSCULAR REEDUCATION: CPT | Mod: HCNC

## 2022-12-22 NOTE — PROGRESS NOTES
Pelvic Health Physical Therapy   Treatment Note     Name: Ascencion Owens  Clinic Number: [id number]    Therapy Diagnosis:   No diagnosis found.    Physician: No ref. provider found    Visit Date: 2022    Physician Orders: PT Eval and Treat  Medical Diagnosis from Referral: urinary frequency  Evaluation Date: 2022  Authorization Period Expiration: 6 visits 22 until 22  Plan of Care Expiration: 11/3/22 to 23  Visit # / Visits authorized: 4/ 6    Cancelled Visits: 0  No Show Visits: 0  FOTO: 1    Time In: 115 (pt arrived late)  Time Out:  200  Total Billable Time: 45 minutes    Precautions: Standard    Subjective     Pt reports: Noticing marked improvement in nocturia. Doing stretching, it hurts. Goes to bed at 10:30, between then and 5 gets up once. Goes back to sleep and between 5 and 7 gets up a few times. Has been eating a lot of sugar. Gets up hourly between 5-7 or whenever he gets up. Has also had improved erectile function.  He was compliant with home exercise program.  Response to previous treatment: felt fine  Functional change: no change    Objective   Pt verbally consents to intrarectal treatment today.  Signed consent form already on file.    Ascencion received therapeutic exercises to develop  strength, endurance, ROM, flexibility, posture, and core stabilization for 15 minutes including:   PPU x 10 dynamic with hold  Supine adductor stretch  Supine figure-4  Happy baby    Ascencion received the following manual therapy techniques: to develop flexibility and extensibility for 15 minutes including: myofascial release of pelvic floor mm  intrarectally    Ascencion participated in neuromuscular re-education activities to develop Coordination, Control, Down training, and Proprioception for 15 minutes includin breathing - practiced 4 sec inhale/6 sec exhalation - TPUS; superficial pelvic floor mm contractions    Ascencion participated in dynamic functional therapeutic activities to  improve functional performance for 00 minutes, including:  Review of sugar as irritants and benefits of exercise for symptoms reduction    Home Exercises Provided and Patient Education Provided     Education provided:   - anatomy/physiology of pelvic floor and diaphragmatic breathing  Discussed progression of plan of care with patient; educated pt in activity modification; reviewed HEP with pt. Pt demonstrated and verbalized understanding of all instruction and was provided with a handout of HEP (see Patient Instructions).    Written Home Exercises Provided: yes.  Exercises were reviewed and Ascencion was able to demonstrate them prior to the end of the session.  Ascencion demonstrated good  understanding of the education provided.     See EMR under Patient Instructions for exercises provided 9/14/2022.    Assessment   He responded well to intrarectal myofascial release so performed again. Reviewed his stretches and progressed for longer holds. Added superficial pelvic mm contractions, which he was able to do with cuing in isolation from deep layer. Added for facilitation of improved erectile function.  Ascencion Is progressing well towards his goals.   Pt prognosis is Excellent.     Pt will continue to benefit from skilled outpatient physical therapy to address the deficits listed in the problem list box on initial evaluation, provide pt/family education and to maximize pt's level of independence in the home and community environment.     Pt's spiritual, cultural and educational needs considered and pt agreeable to plan of care and goals.     Anticipated barriers to physical therapy: none    Goals: Short Term Goals: 4 weeks   Pt indep in HEP MET  Pt able to wait at least 2 hours between trips to the bathroom for improved participation in ADLs MET  Pt demo pelvic floor mm strength at least 3/5 for improved continence and support of pelvic organs MET  Nocturia no more than 3x/night for improved quality of sleep  PROGRESSING     Long Term Goals: 8 weeks   Pt indep in progressive HEP PROGRESSING  Pt reports 0 episodes of leakage in at least 2 weeks for improved ADL participation PROGRESSING  Pt able to wait at least 2.5-4 hours between trips to the bathroom  PROGRESSING  Nocturia no more than 1x/night for improved quality of sleep PROGRESSING  Pt demo pelvic floor mm strength at least 3+/5 for improved continence and support of pelvic organs PROGRESSING        Plan      Plan of care Certification: 11/3/22 to 2/1/23     Outpatient Physical Therapy 1 times weekly for 12 weeks to include the following interventions: therapeutic exercises, therapeutic activity, neuromuscular re-education, manual therapy, patient/family education and self care/home management    Next visit: pelvic floor myofascial release, hip strengthening, lumbar spine assessment and treatment    Cornelia Driver, PT

## 2022-12-29 ENCOUNTER — CLINICAL SUPPORT (OUTPATIENT)
Dept: REHABILITATION | Facility: OTHER | Age: 67
End: 2022-12-29
Attending: FAMILY MEDICINE
Payer: MEDICARE

## 2022-12-29 DIAGNOSIS — R27.9 LACK OF COORDINATION: Primary | ICD-10-CM

## 2022-12-29 PROCEDURE — 97110 THERAPEUTIC EXERCISES: CPT | Mod: HCNC

## 2022-12-29 PROCEDURE — 97530 THERAPEUTIC ACTIVITIES: CPT | Mod: HCNC

## 2022-12-29 NOTE — PROGRESS NOTES
"  Pelvic Health Physical Therapy   Treatment Note     Name: Ascencion Owens  Clinic Number: [id number]    Therapy Diagnosis:   Encounter Diagnosis   Name Primary?    Lack of coordination Yes       Physician: No ref. provider found    Visit Date: 2022    Physician Orders: PT Eval and Treat  Medical Diagnosis from Referral: urinary frequency  Evaluation Date: 2022  Authorization Period Expiration: 6 visits 22 until 22  Plan of Care Expiration: 11/3/22 to 23  Visit # / Visits authorized: 5/ 6    Cancelled Visits: 0  No Show Visits: 0  FOTO: 1    Time In: 105   Time Out:  200  Total Billable Time: 55 minutes    Precautions: Standard    Subjective     Pt reports: Continues to notice connection between behaviors and nocturia, but has been engaging in activities he knows contribute to getting up more. Has been told he "walks like an old man" and remembers his father walked this way (flexed trunk). Can get back pain with prolonged walking if does not do stretches.   He was compliant with home exercise program.  Response to previous treatment: felt fine  Functional change: no change    Objective   Pt verbally consents to intrarectal treatment today.  Signed consent form already on file.    Ascencion received therapeutic exercises to develop  strength, endurance, ROM, flexibility, posture, and core stabilization for 30 minutes including:   PPU x 10 dynamic with hold  Doorway stretch, 60 sec x 2  Hip flexor stretch, 60 sec x 2  Cat-cow, 2 x 10  P/l extensions x 10  Deadlifts, 2 x 10 with 10#    Ascencion received the following manual therapy techniques: to develop flexibility and extensibility for 00 minutes including: myofascial release of pelvic floor mm  intrarectally    Ascencion participated in neuromuscular re-education activities to develop Coordination, Control, Down training, and Proprioception for 00 minutes includin breathing - practiced 4 sec inhale/6 sec exhalation - TPUS; superficial " pelvic floor mm contractions    Ascencion participated in dynamic functional therapeutic activities to improve functional performance for 25 minutes, including:  Discussed ongoing POC  Postural awareness in sitting, standing, upright position with ambulating    Home Exercises Provided and Patient Education Provided     Education provided:   - anatomy/physiology of pelvic floor and diaphragmatic breathing  Discussed progression of plan of care with patient; educated pt in activity modification; reviewed HEP with pt. Pt demonstrated and verbalized understanding of all instruction and was provided with a handout of HEP (see Patient Instructions).    Written Home Exercises Provided: yes.  Exercises were reviewed and Ascencion was able to demonstrate them prior to the end of the session.  Ascencion demonstrated good  understanding of the education provided.     See EMR under Patient Instructions for exercises provided 9/14/2022.    Assessment   Discussed posture and gait, as he walks with forward lean increasing fall risk as well as increasing tension at lumbar spine. Provided anterior chain stretching and posterior chain strengthening and reviewed his HEP. He is largely managing well. Will be seen in one month to see how he performs with indep mgmt.   Ascencion Is progressing well towards his goals.   Pt prognosis is Excellent.     Pt will continue to benefit from skilled outpatient physical therapy to address the deficits listed in the problem list box on initial evaluation, provide pt/family education and to maximize pt's level of independence in the home and community environment.     Pt's spiritual, cultural and educational needs considered and pt agreeable to plan of care and goals.     Anticipated barriers to physical therapy: none    Goals: Short Term Goals: 4 weeks   Pt indep in HEP MET  Pt able to wait at least 2 hours between trips to the bathroom for improved participation in ADLs MET  Pt demo pelvic floor mm  strength at least 3/5 for improved continence and support of pelvic organs MET  Nocturia no more than 3x/night for improved quality of sleep PROGRESSING     Long Term Goals: 8 weeks   Pt indep in progressive HEP PROGRESSING  Pt reports 0 episodes of leakage in at least 2 weeks for improved ADL participation PROGRESSING  Pt able to wait at least 2.5-4 hours between trips to the bathroom  PROGRESSING  Nocturia no more than 1x/night for improved quality of sleep PROGRESSING  Pt demo pelvic floor mm strength at least 3+/5 for improved continence and support of pelvic organs PROGRESSING        Plan      Plan of care Certification: 11/3/22 to 2/1/23     Outpatient Physical Therapy 1 times weekly for 12 weeks to include the following interventions: therapeutic exercises, therapeutic activity, neuromuscular re-education, manual therapy, patient/family education and self care/home management    Next visit: pelvic floor myofascial release, hip strengthening, lumbar spine assessment and treatment    Cornelia Driver, PT

## 2023-01-04 ENCOUNTER — PATIENT OUTREACH (OUTPATIENT)
Dept: ADMINISTRATIVE | Facility: HOSPITAL | Age: 68
End: 2023-01-04
Payer: MEDICARE

## 2023-01-31 ENCOUNTER — HOSPITAL ENCOUNTER (EMERGENCY)
Facility: HOSPITAL | Age: 68
Discharge: HOME | End: 2023-01-31
Attending: EMERGENCY MEDICINE
Payer: MEDICARE

## 2023-01-31 ENCOUNTER — NURSE TRIAGE (OUTPATIENT)
Dept: ADMINISTRATIVE | Facility: CLINIC | Age: 68
End: 2023-01-31
Payer: MEDICARE

## 2023-01-31 VITALS
WEIGHT: 215 LBS | HEIGHT: 69 IN | HEART RATE: 78 BPM | BODY MASS INDEX: 31.84 KG/M2 | TEMPERATURE: 98 F | OXYGEN SATURATION: 97 % | RESPIRATION RATE: 18 BRPM | SYSTOLIC BLOOD PRESSURE: 148 MMHG | DIASTOLIC BLOOD PRESSURE: 73 MMHG

## 2023-01-31 DIAGNOSIS — I82.432 ACUTE DEEP VEIN THROMBOSIS (DVT) OF POPLITEAL VEIN OF LEFT LOWER EXTREMITY: Primary | ICD-10-CM

## 2023-01-31 DIAGNOSIS — M79.89 LEG SWELLING: ICD-10-CM

## 2023-01-31 LAB
ALBUMIN SERPL BCP-MCNC: 3.8 G/DL (ref 3.5–5.2)
ALP SERPL-CCNC: 66 U/L (ref 55–135)
ALT SERPL W/O P-5'-P-CCNC: 19 U/L (ref 10–44)
ANION GAP SERPL CALC-SCNC: 13 MMOL/L (ref 8–16)
APTT BLDCRRT: 25.9 SEC (ref 21–32)
AST SERPL-CCNC: 27 U/L (ref 10–40)
BASOPHILS # BLD AUTO: 0.05 K/UL (ref 0–0.2)
BASOPHILS NFR BLD: 0.6 % (ref 0–1.9)
BILIRUB SERPL-MCNC: 0.7 MG/DL (ref 0.1–1)
BNP SERPL-MCNC: <10 PG/ML (ref 0–99)
BUN SERPL-MCNC: 13 MG/DL (ref 8–23)
CALCIUM SERPL-MCNC: 8.8 MG/DL (ref 8.7–10.5)
CHLORIDE SERPL-SCNC: 105 MMOL/L (ref 95–110)
CO2 SERPL-SCNC: 26 MMOL/L (ref 23–29)
CREAT SERPL-MCNC: 1 MG/DL (ref 0.5–1.4)
DIFFERENTIAL METHOD: ABNORMAL
EOSINOPHIL # BLD AUTO: 0.3 K/UL (ref 0–0.5)
EOSINOPHIL NFR BLD: 3.4 % (ref 0–8)
ERYTHROCYTE [DISTWIDTH] IN BLOOD BY AUTOMATED COUNT: 13.8 % (ref 11.5–14.5)
EST. GFR  (NO RACE VARIABLE): >60 ML/MIN/1.73 M^2
GLUCOSE SERPL-MCNC: 85 MG/DL (ref 70–110)
HCT VFR BLD AUTO: 42.3 % (ref 40–54)
HGB BLD-MCNC: 14.5 G/DL (ref 14–18)
IMM GRANULOCYTES # BLD AUTO: 0.01 K/UL (ref 0–0.04)
IMM GRANULOCYTES NFR BLD AUTO: 0.1 % (ref 0–0.5)
INR PPP: 1 (ref 0.8–1.2)
LYMPHOCYTES # BLD AUTO: 2 K/UL (ref 1–4.8)
LYMPHOCYTES NFR BLD: 25.4 % (ref 18–48)
MCH RBC QN AUTO: 27.9 PG (ref 27–31)
MCHC RBC AUTO-ENTMCNC: 34.3 G/DL (ref 32–36)
MCV RBC AUTO: 81 FL (ref 82–98)
MONOCYTES # BLD AUTO: 0.9 K/UL (ref 0.3–1)
MONOCYTES NFR BLD: 10.6 % (ref 4–15)
NEUTROPHILS # BLD AUTO: 4.8 K/UL (ref 1.8–7.7)
NEUTROPHILS NFR BLD: 59.9 % (ref 38–73)
NRBC BLD-RTO: 0 /100 WBC
PLATELET # BLD AUTO: 161 K/UL (ref 150–450)
PMV BLD AUTO: 10.1 FL (ref 9.2–12.9)
POTASSIUM SERPL-SCNC: 3.7 MMOL/L (ref 3.5–5.1)
PROT SERPL-MCNC: 7.4 G/DL (ref 6–8.4)
PROTHROMBIN TIME: 10.9 SEC (ref 9–12.5)
RBC # BLD AUTO: 5.2 M/UL (ref 4.6–6.2)
SODIUM SERPL-SCNC: 144 MMOL/L (ref 136–145)
WBC # BLD AUTO: 8.03 K/UL (ref 3.9–12.7)

## 2023-01-31 PROCEDURE — 83880 ASSAY OF NATRIURETIC PEPTIDE: CPT | Mod: HCNC | Performed by: EMERGENCY MEDICINE

## 2023-01-31 PROCEDURE — 99284 EMERGENCY DEPT VISIT MOD MDM: CPT | Mod: 25,HCNC

## 2023-01-31 PROCEDURE — 25000003 PHARM REV CODE 250: Mod: HCNC | Performed by: EMERGENCY MEDICINE

## 2023-01-31 PROCEDURE — 85730 THROMBOPLASTIN TIME PARTIAL: CPT | Mod: HCNC | Performed by: EMERGENCY MEDICINE

## 2023-01-31 PROCEDURE — 85025 COMPLETE CBC W/AUTO DIFF WBC: CPT | Mod: HCNC | Performed by: EMERGENCY MEDICINE

## 2023-01-31 PROCEDURE — 80053 COMPREHEN METABOLIC PANEL: CPT | Mod: HCNC | Performed by: EMERGENCY MEDICINE

## 2023-01-31 PROCEDURE — 85610 PROTHROMBIN TIME: CPT | Mod: HCNC | Performed by: EMERGENCY MEDICINE

## 2023-01-31 RX ADMIN — APIXABAN 10 MG: 5 TABLET, FILM COATED ORAL at 09:01

## 2023-01-31 NOTE — TELEPHONE ENCOUNTER
Patient states c/o left leg calf swelling and tightness. Patient states left leg is purplish in color at this time in comparison to his right leg.    Care Advice given to Go to ED Now for evaluation/treatment and to have another adult transport/drive to ED. Patient states understanding of care advice.     Reason for Disposition   Entire foot is cool or blue in comparison to other side    Additional Information   Negative: Sounds like a life-threatening emergency to the triager   Negative: Chest pain   Negative: Small area of swelling and followed an insect bite to the area   Negative: Followed a knee injury   Negative: Ankle or foot injury   Negative: Pregnant with leg swelling or edema   Negative: Difficulty breathing at rest    Protocols used: Leg Swelling and Edema-A-[place]

## 2023-02-01 NOTE — DISCHARGE INSTRUCTIONS
Please avoid any event where you could injury yourself as you are at increase risk on blood thinners or life threatening bleeding. Return to the ED immediately if you have any trauma, hit your head or any other concerns. Please take medication as prescribed, you will need to be on it at least 3 months and possibly for your entire life. You need a blood clotting workup as soon as possible by your primary care doctor or hematology. I have referred you to both but please make an appointment as soon as possible. Return for any chest pain, shortness of breath, worsening leg swelling or pain or any other concerns.     Thank you for coming to our Emergency Department today. As we discussed, it is important to remember that some problems are difficult to diagnose and may not be found during your Emergency Department visit. Be sure to follow up with your primary care doctor and review all labs/imaging/tests that were performed during this visit with them. Some labs/tests may be outside of the normal range and require non-emergent follow-up and further investigation to help diagnose/exclude/prevent complications or other medical conditions.    If you do not have a primary care doctor, you may contact the one listed on your discharge paperwork or you may also call the [facility] Appointment Desk at [phone] to schedule an appointment and establish care with one. It is important to your health that you have a primary care doctor.    Please take all medications as directed. All medications may potentially have side-effects and it is impossible to predict which medications may give you side-effects or what side-effects (if any) they will give you.. If you feel that you are having a negative effect or side-effect of any medication you should immediately stop taking them and seek medical attention. If you feel that you are having a life-threatening reaction call 911.    Return to the ER with any questions/concerns,  new/concerning symptoms, worsening or failure to improve.     Do not drive, swim, climb to height, take a bath or make any important decisions for 24 hours if you have received any pain medications, sedatives or mood altering drugs during your ER visit.

## 2023-02-01 NOTE — ED PROVIDER NOTES
"Encounter Date: 1/31/2023    SCRIBE #1 NOTE: I, Flora Velasco, am scribing for, and in the presence of,  Sangita Zarate MD.     History     Chief Complaint   Patient presents with    Leg Pain     Pain and swelling to left calf. No SOB. Ambulatory      Ascencion Owens is a 67 y.o. male, with a PMHx of HTN, HLD, CKD, arthritis, prediabetes, who presents to the ED with leg swelling. Patient reports he has bilateral leg swelling at baseline d/t taking amlodipine, onset usually throughout the day. He does not usually wake up in the AM with leg swelling, but this AM, he noticed his bilateral legs were swollen, L>R, which is atypical for him in the mornings. He does report h/o DVT 10 years ago and was on Coumadin for 1-2 mo at the time. He is currently not on any anticoagulation. No other exacerbating or alleviating factors. Denies recent surgeries or other immobilizations, Denies hormone therapy use. No trauma/injuries/falls but states he was on a ladder 3 days ago, states his muscles "have been a bit stiff". He did work in the yard yesterday but didn't sustain any wounds or insect bites. No recent or frequent head injury, or recent syncope. Denies chest pain, dyspnea, fever, hemoptysis, abnormal bleeding such as melena, hematochezia, hematuria, gait abnormality, or other associated symptoms. Patient is on Amlodipine, atorvastatin, ASA, mobic, Zanaflex daily medications. Patient admits to occasional EtOH consumption, denies tobacco, or illicit drug use. Patient reports a PSHx of appendectomy, ankle surgery, colonoscopy. Patient has no known drug allergies, has allergy to perfume scents.     The history is provided by the patient.   Review of patient's allergies indicates:   Allergen Reactions    Perfume Rash     Allergic to certain cologne fragrance - rash certain area of the body      Past Medical History:   Diagnosis Date    Acute bronchitis     Arthritis     CKD (chronic kidney disease)     Cluster headache     " Dyslipidemia     Essential hypertension, benign     Hypercholesteremia     Obesity      Past Surgical History:   Procedure Laterality Date    APPENDECTOMY      COLONOSCOPY  2004    lt ankle surgery  1973     Family History   Problem Relation Age of Onset    Diabetes Mother     Hypertension Mother     Heart disease Mother      Social History     Tobacco Use    Smoking status: Former     Types: Cigars     Start date:      Quit date:      Years since quittin.0    Smokeless tobacco: Never    Tobacco comments:     occ   Substance Use Topics    Alcohol use: Yes     Comment: occasionally    Drug use: No     Review of Systems   Constitutional:  Negative for chills, diaphoresis and fever.   HENT:  Negative for drooling, sore throat and voice change.    Eyes:  Negative for photophobia and visual disturbance.   Respiratory:  Negative for cough, shortness of breath and wheezing.    Cardiovascular:  Positive for leg swelling. Negative for chest pain.   Gastrointestinal:  Negative for abdominal pain, blood in stool, constipation, diarrhea, nausea and vomiting.   Genitourinary:  Negative for dysuria, frequency, hematuria and urgency.   Musculoskeletal:  Negative for gait problem, myalgias, neck pain and neck stiffness.   Skin:  Negative for rash and wound.   Neurological:  Negative for syncope, weakness, light-headedness, numbness and headaches.   Hematological:  Does not bruise/bleed easily.   Psychiatric/Behavioral:  Negative for agitation, confusion and suicidal ideas.    All other systems reviewed and are negative.    Physical Exam     Initial Vitals [23 1713]   BP Pulse Resp Temp SpO2   (!) 154/68 61 18 97.9 °F (36.6 °C) 98 %      MAP       --         Physical Exam    Nursing note and vitals reviewed.  Constitutional: He appears well-developed and well-nourished. He is not diaphoretic. No distress.   HENT:   Head: Normocephalic and atraumatic.   Right Ear: External ear normal.   Left Ear: External ear  normal.   Mouth/Throat: Oropharynx is clear and moist. No oropharyngeal exudate.   Eyes: Conjunctivae and EOM are normal. Pupils are equal, round, and reactive to light. Right eye exhibits no discharge. Left eye exhibits no discharge.   Neck: Neck supple. No JVD present.   Normal range of motion.  Cardiovascular:  Normal rate, regular rhythm, normal heart sounds and intact distal pulses.     Exam reveals no gallop and no friction rub.       No murmur heard.  Pulmonary/Chest: Breath sounds normal. No respiratory distress. He has no wheezes. He has no rhonchi. He has no rales.   Abdominal: Abdomen is soft. Bowel sounds are normal. He exhibits no distension. There is no abdominal tenderness. There is no rebound and no guarding.   Musculoskeletal:         General: Edema present. No tenderness. Normal range of motion.      Cervical back: Normal range of motion and neck supple.      Comments: Trace pitting edema bilaterally L>R, swollen, erythematous, and slight amount of warmth.      Lymphadenopathy:     He has no cervical adenopathy.   Neurological: He is alert and oriented to person, place, and time. He has normal strength. No cranial nerve deficit or sensory deficit. GCS score is 15. GCS eye subscore is 4. GCS verbal subscore is 5. GCS motor subscore is 6.   Skin: Skin is warm and dry. Capillary refill takes less than 2 seconds.   Psychiatric: He has a normal mood and affect. Thought content normal.       ED Course   Procedures  Labs Reviewed   CBC W/ AUTO DIFFERENTIAL - Abnormal; Notable for the following components:       Result Value    MCV 81 (*)     All other components within normal limits   COMPREHENSIVE METABOLIC PANEL   PROTIME-INR   APTT   B-TYPE NATRIURETIC PEPTIDE   B-TYPE NATRIURETIC PEPTIDE          Imaging Results               US Lower Extremity Veins Left (Final result)  Result time 01/31/23 21:09:01      Final result by Bita Schneider MD (01/31/23 21:09:01)                   Impression:       Deep vein thrombus in the left popliteal, peroneal, and posterior tibial veins.    This report was flagged in Epic as abnormal.      Electronically signed by: Bita Schneider  Date:    01/31/2023  Time:    21:09               Narrative:    EXAMINATION:  US LOWER EXTREMITY VEINS LEFT    CLINICAL HISTORY:  Other specified soft tissue disorders    TECHNIQUE:  Duplex and color flow Doppler evaluation and graded compression of the left lower extremity veins was performed.    COMPARISON:  04/16/2011    FINDINGS:  Left thigh veins: There is thrombus in the left popliteal vein.  The common femoral, femoral, upper greater saphenous, and deep femoral veins are patent and free of thrombus. The veins are normally compressible and have normal phasic flow and augmentation response.    Left calf veins: No thrombus is seen in the anterior tibial veins.  There is thrombus seen in the posterior tibial and peroneal veins.    Contralateral CFV: The contralateral (right) common femoral vein is patent and free of thrombus.    Miscellaneous: None                                       Medications   apixaban tablet 10 mg (10 mg Oral Given 1/31/23 2134)     Medical Decision Making:   History:   Old Medical Records: I decided to obtain old medical records.  Clinical Tests:   Lab Tests: Ordered and Reviewed  Radiological Study: Ordered and Reviewed     MDM  67-year-old male with past history of hypertension, hyperlipidemia, CKD, remote PE of unknown cause presents with left leg swelling.  Patient noticed swelling and tightness worse in the left greater than right leg this morning.  He denies any chest pain, shortness of breath.  Denies any recent travel, hormone use, hemoptysis.  He denies any injury to that leg.  Left leg is slightly larger than right leg, slightly more intense, mild erythema and slightly warm.  Differential diagnosis includes was not limited to CHF, DVT, cellulitis, medication side-effect from amlodipine, kidney disorder.   Will obtain labs, an ultrasound of lower extremity for further evaluation.    Patient's labs with no acute concerning findings.  DVT study is positive for a popliteal, peroneal and posterior tibial vein.  Given popliteal is a proximal vein will treat with anticoagulation.  Patient is familiar with Eliquis as his wife had previously been on it so will start him on that.  First dose given in ED. we discussed bleeding precautions including avoiding strenuous exercise until cleared by his regular doctor.  Discussed strict return precautions for any trauma, bleeding, melena or bright red blood.  Patient denies any bleeding including any melena or bright red blood currently.  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  Patient agreement with plan, all questions answered to himself and his wife.  He was given strict return precautions for any chest pain, shortness of breath, worsening swelling, worsening pain or any other concerns.     Scribe Attestation:   Scribe #1: I performed the above scribed service and the documentation accurately describes the services I performed. I attest to the accuracy of the note.                   Clinical Impression:   Final diagnoses:  [M79.89] Leg swelling  [I82.432] Acute deep vein thrombosis (DVT) of popliteal vein of left lower extremity (Primary)        ED Disposition Condition    Discharge Stable        I, Sangita Zarate, personally performed the services described in this documentation. All medical record entries made by the scribe were at my direction and in my presence. I have reviewed the chart and agree that the record reflects my personal performance and is accurate and complete.    ED Prescriptions       Medication Sig Dispense Start Date End Date Auth. Provider    apixaban (ELIQUIS) 5  mg Tab Take 2 tablets (10 mg total) by mouth 2 (two) times daily for 7 days, THEN 1 tablet (5 mg total) 2 (two) times daily. 84 tablet 1/31/2023 3/7/2023 Sangita Zarate MD          Follow-up Information       Follow up With Specialties Details Why Contact Info Additional Information    [facility] Emergency Dept Emergency Medicine  As needed, If symptoms worsen [address]  [facility] [address] [phone]     Oswald De La Cruz MD Internal Medicine Schedule an appointment as soon as possible for a visit in 2 days to discuss recent ED visit, to establish primary doctor, to begin workup for blood clots [address] [facility] [address] [phone]       [facility] Hematology Oncology Hematology and Oncology Schedule an appointment as soon as possible for a visit in 3 days to discuss recent ED visit [address] [facility] [address]  [facility] [address] [phone] Please park in garage and use Medical Office Bldg entrance. Check in at [address]. If you are having a Bone Marrow Biopsy, please go to [address].             Sangita Zarate MD  01/31/23 [id number]

## 2023-02-01 NOTE — FIRST PROVIDER EVALUATION
Emergency Department TeleTriage Encounter Note      CHIEF COMPLAINT    Chief Complaint   Patient presents with    Leg Pain     Pain and swelling to left calf. No SOB. Ambulatory        VITAL SIGNS   Initial Vitals [01/31/23 1713]   BP Pulse Resp Temp SpO2   (!) 154/68 61 18 97.9 °F (36.6 °C) 98 %      MAP       --            ALLERGIES    Review of patient's allergies indicates:   Allergen Reactions    Perfume Rash     Allergic to certain cologne fragrance - rash certain area of the body        PROVIDER TRIAGE NOTE  This is a teletriage evaluation of a 67 y.o. male presenting to the ED complaining of leg pain. Patient reports left calf swelling that he noticed this morning. He denies injury. He has had PEs in the past. Not currently on blood thinner.     Patient is alert and oriented. He speaks in complete sentences. He is sitting upright in the chair in no distress.     Initial orders will be placed and care will be transferred to an alternate provider when patient is roomed for a full evaluation. Any additional orders and the final disposition will be determined by that provider.         ORDERS  Labs Reviewed - No data to display    ED Orders (720h ago, onward)      Start Ordered     Status Ordering Provider    01/31/23 1802 01/31/23 1801  US Lower Extremity Veins Left  1 time imaging         Ordered ALICE JOEL              Virtual Visit Note: The provider triage portion of this emergency department evaluation and documentation was performed via Emer[employer], a HIPAA-compliant telemedicine application, in concert with a tele-presenter in the room. A face to face patient evaluation with one of my colleagues will occur once the patient is placed in an emergency department room.      DISCLAIMER: This note was prepared with [employer]ap[employer] voice recognition transcription software. Garbled syntax, mangled pronouns, and other bizarre constructions may be attributed to that software system.

## 2023-02-02 ENCOUNTER — OFFICE VISIT (OUTPATIENT)
Dept: PRIMARY CARE CLINIC | Facility: CLINIC | Age: 68
End: 2023-02-02
Payer: MEDICARE

## 2023-02-02 VITALS
WEIGHT: 220.69 LBS | DIASTOLIC BLOOD PRESSURE: 80 MMHG | SYSTOLIC BLOOD PRESSURE: 142 MMHG | OXYGEN SATURATION: 98 % | HEART RATE: 62 BPM | TEMPERATURE: 98 F | BODY MASS INDEX: 32.59 KG/M2

## 2023-02-02 DIAGNOSIS — Z86.69 HISTORY OF CLUSTER HEADACHE: ICD-10-CM

## 2023-02-02 DIAGNOSIS — Z83.2 FAMILY HISTORY OF BETA THALASSEMIA: ICD-10-CM

## 2023-02-02 DIAGNOSIS — I10 ESSENTIAL HYPERTENSION: ICD-10-CM

## 2023-02-02 DIAGNOSIS — I82.432 ACUTE DEEP VEIN THROMBOSIS (DVT) OF POPLITEAL VEIN OF LEFT LOWER EXTREMITY: Primary | ICD-10-CM

## 2023-02-02 DIAGNOSIS — Z86.711 HISTORY OF PULMONARY EMBOLISM: ICD-10-CM

## 2023-02-02 PROCEDURE — 3008F BODY MASS INDEX DOCD: CPT | Mod: CPTII,S$GLB,, | Performed by: NURSE PRACTITIONER

## 2023-02-02 PROCEDURE — 1160F RVW MEDS BY RX/DR IN RCRD: CPT | Mod: CPTII,S$GLB,, | Performed by: NURSE PRACTITIONER

## 2023-02-02 PROCEDURE — 1159F PR MEDICATION LIST DOCUMENTED IN MEDICAL RECORD: ICD-10-PCS | Mod: CPTII,S$GLB,, | Performed by: NURSE PRACTITIONER

## 2023-02-02 PROCEDURE — 1160F PR REVIEW ALL MEDS BY PRESCRIBER/CLIN PHARMACIST DOCUMENTED: ICD-10-PCS | Mod: CPTII,S$GLB,, | Performed by: NURSE PRACTITIONER

## 2023-02-02 PROCEDURE — 99999 PR PBB SHADOW E&M-EST. PATIENT-LVL III: ICD-10-PCS | Mod: PBBFAC,,, | Performed by: NURSE PRACTITIONER

## 2023-02-02 PROCEDURE — 3079F PR MOST RECENT DIASTOLIC BLOOD PRESSURE 80-89 MM HG: ICD-10-PCS | Mod: CPTII,S$GLB,, | Performed by: NURSE PRACTITIONER

## 2023-02-02 PROCEDURE — 3077F PR MOST RECENT SYSTOLIC BLOOD PRESSURE >= 140 MM HG: ICD-10-PCS | Mod: CPTII,S$GLB,, | Performed by: NURSE PRACTITIONER

## 2023-02-02 PROCEDURE — 99999 PR PBB SHADOW E&M-EST. PATIENT-LVL III: CPT | Mod: PBBFAC,,, | Performed by: NURSE PRACTITIONER

## 2023-02-02 PROCEDURE — 1159F MED LIST DOCD IN RCRD: CPT | Mod: CPTII,S$GLB,, | Performed by: NURSE PRACTITIONER

## 2023-02-02 PROCEDURE — 99214 OFFICE O/P EST MOD 30 MIN: CPT | Mod: S$GLB,,, | Performed by: NURSE PRACTITIONER

## 2023-02-02 PROCEDURE — 3079F DIAST BP 80-89 MM HG: CPT | Mod: CPTII,S$GLB,, | Performed by: NURSE PRACTITIONER

## 2023-02-02 PROCEDURE — 99214 PR OFFICE/OUTPT VISIT, EST, LEVL IV, 30-39 MIN: ICD-10-PCS | Mod: S$GLB,,, | Performed by: NURSE PRACTITIONER

## 2023-02-02 PROCEDURE — 3077F SYST BP >= 140 MM HG: CPT | Mod: CPTII,S$GLB,, | Performed by: NURSE PRACTITIONER

## 2023-02-02 PROCEDURE — 3008F PR BODY MASS INDEX (BMI) DOCUMENTED: ICD-10-PCS | Mod: CPTII,S$GLB,, | Performed by: NURSE PRACTITIONER

## 2023-02-02 NOTE — PROGRESS NOTES
"[facility] Note    Chief Complaint      Chief Complaint   Patient presents with    Follow-up     Hospital follow up     History of Present Illness      Ascencion Owens is a 67 y.o. male patient with chronic conditions of h/o of PE, h/o cluster headaches, HTN, HLD, obesity, with a PMHx of HTN, HLD, CKD, arthritis, prediabetes, who is new to me and presents today for ER f/u from 1/31/23 for DVT to left LE. Pt has started eliquis. Tolerating well, no concerns. No reports of sob, cp, groin pain, headaches, no bleeding or worsening bruising or swelling, pt reports no real pain. Pt reports swelling lower extremity has improved some    Pt reports he has given blood in the past and was told his blood is thick.  Doesn't know of any genetic component for blood clots  H/o PE in 2011  Has been on asa 81mg since 2011 due to helping reduce the headaches.   Reports that he was most probably exposed to asbestos- had pneumonitis in 2021  Daughter has Beta Thalassemia    Complaints of having left hip pain      Pt is a retired     ER note:    who presents to the ED with leg swelling. Patient reports he has bilateral leg swelling at baseline d/t taking amlodipine, onset usually throughout the day. He does not usually wake up in the AM with leg swelling, but this AM, he noticed his bilateral legs were swollen, L>R, which is atypical for him in the mornings. He does report h/o DVT 10 years ago and was on Coumadin for 1-2 mo at the time. He is currently not on any anticoagulation. No other exacerbating or alleviating factors. Denies recent surgeries or other immobilizations, Denies hormone therapy use. No trauma/injuries/falls but states he was on a ladder 3 days ago, states his muscles "have been a bit stiff". He did work in the yard yesterday but didn't sustain any wounds or insect bites. No recent or frequent head injury, or recent syncope. Denies chest pain, dyspnea, fever, hemoptysis, abnormal bleeding " such as melena, hematochezia, hematuria, gait abnormality, or other associated symptoms. Patient is on Amlodipine, atorvastatin, ASA, mobic, Zanaflex daily medications. Patient admits to occasional EtOH consumption, denies tobacco, or illicit drug use. Patient reports a PSHx of appendectomy, ankle surgery, colonoscopy. Patient has no known drug allergies, has allergy to perfume scents.      US Lower Extremity Veins Left (Final result  Deep vein thrombus in the left popliteal, peroneal, and posterior tibial veins.    MDM  67-year-old male with past history of hypertension, hyperlipidemia, CKD, remote PE of unknown cause presents with left leg swelling.  Patient noticed swelling and tightness worse in the left greater than right leg this morning.  He denies any chest pain, shortness of breath.  Denies any recent travel, hormone use, hemoptysis.  He denies any injury to that leg.  Left leg is slightly larger than right leg, slightly more intense, mild erythema and slightly warm.  Differential diagnosis includes was not limited to CHF, DVT, cellulitis, medication side-effect from amlodipine, kidney disorder.  Will obtain labs, an ultrasound of lower extremity for further evaluation.     Patient's labs with no acute concerning findings.  DVT study is positive for a popliteal, peroneal and posterior tibial vein.  Given popliteal is a proximal vein will treat with anticoagulation.  Patient is familiar with Eliquis as his wife had previously been on it so will start him on that.  First dose given in ED. we discussed bleeding precautions including avoiding strenuous exercise until cleared by his regular doctor.  Discussed strict return precautions for any trauma, bleeding, melena or bright red blood.  Patient denies any bleeding including any melena or bright red blood currently.  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  Patient agreement with plan, all questions answered to himself and his wife.  He was given strict return precautions for any chest pain, shortness of breath, worsening swelling, worsening pain or any other concerns.  Health Maintenance   Topic Date Due    Abdominal Aortic Aneurysm Screening  Never done    Lipid Panel  2027    TETANUS VACCINE  10/02/2029    Hepatitis C Screening  Completed       Past Medical History:   Diagnosis Date    Acute bronchitis     Arthritis     CKD (chronic kidney disease)     Cluster headache     Dyslipidemia     Essential hypertension, benign     Hypercholesteremia     Obesity        Past Surgical History:   Procedure Laterality Date    APPENDECTOMY      COLONOSCOPY  2004    FRACTURE SURGERY  [employer] football    lt ankle surgery  1973    VASECTOMY  Around        family history includes Arthritis in his father, mother, and sister; Diabetes in his mother; Heart disease in his mother; Hypertension in his mother.     Social History     Tobacco Use    Smoking status: Former     Types: Cigars     Start date:      Quit date:      Years since quittin.1    Smokeless tobacco: Never    Tobacco comments:     occ   Substance Use Topics    Alcohol use: Yes     Alcohol/week: 1.0 standard drink     Types: 1 Shots of liquor per week     Comment: occasionally    Drug use: No       Review of Systems   Constitutional:  Negative for fever.   Respiratory:  Negative for hemoptysis and shortness of breath.    Cardiovascular:  Positive for leg swelling. Negative for chest pain, palpitations and claudication.   Gastrointestinal:  Negative for diarrhea, nausea and vomiting.   Genitourinary:  Negative for dysuria.   Musculoskeletal:  Positive for joint pain. Negative for myalgias.   Neurological:  Negative for tingling, focal weakness, loss of consciousness, weakness and  headaches.      Outpatient Encounter Medications as of 2/2/2023   Medication Sig Note Dispense Refill    amLODIPine (NORVASC) 10 MG tablet TAKE 1 TABLET (10 MG TOTAL) BY MOUTH ONCE DAILY.  90 tablet 0    apixaban (ELIQUIS) 5 mg Tab Take 2 tablets (10 mg total) by mouth 2 (two) times daily for 7 days, THEN 1 tablet (5 mg total) 2 (two) times daily.  84 tablet 0    aspirin (ECOTRIN) 81 MG EC tablet Take 81 mg by mouth once daily.       atorvastatin (LIPITOR) 20 MG tablet TAKE 1 TABLET EVERY DAY  90 tablet 3    FLUCELVAX QUAD 6791-1063, PF, 60 mcg (15 mcg x 4)/0.5 mL Syrg ADM 0.5ML IM UTD       tiZANidine (ZANAFLEX) 4 MG tablet TAKE 1 TO 2 TABLETS BY MOUTH NIGHTLY AS NEEDED FOR PAIN  180 tablet 2    ketoconazole (NIZORAL) 2 % cream Apply topically once daily. 12/7/2021: prn 60 g 0     No facility-administered encounter medications on file as of 2/2/2023.       Review of patient's allergies indicates:   Allergen Reactions    Perfume Rash     Allergic to certain cologne fragrance - rash certain area of the body        Physical Exam      Vital Signs  Temp: 98.1 °F (36.7 °C)  Pulse: 62  SpO2: 98 %  BP: (!) 142/80  Height and Weight  Weight: 100.1 kg (220 lb 10.9 oz)    Physical Exam  Vitals and nursing note reviewed.   Constitutional:       General: He is not in acute distress.     Appearance: Normal appearance. He is not ill-appearing.   HENT:      Head: Normocephalic and atraumatic.   Cardiovascular:      Rate and Rhythm: Normal rate and regular rhythm.      Heart sounds: Normal heart sounds.   Pulmonary:      Effort: Pulmonary effort is normal. No respiratory distress.      Breath sounds: Normal breath sounds. No wheezing or rhonchi.   Musculoskeletal:         General: Swelling present.      Comments: LLE with 1+ edema noted     Skin:     General: Skin is dry.      Findings: Erythema present. No bruising or rash.   Neurological:      General: No focal deficit present.      Mental Status: He is alert and oriented to  person, place, and time.   Psychiatric:         Mood and Affect: Mood normal.         Behavior: Behavior normal.         Thought Content: Thought content normal.         Judgment: Judgment normal.        Laboratory:  CBC:  Lab Results   Component Value Date    WBC 8.03 01/31/2023    RBC 5.20 01/31/2023    HGB 14.5 01/31/2023    HCT 42.3 01/31/2023     01/31/2023    MCV 81 (L) 01/31/2023    MCH 27.9 01/31/2023    MCHC 34.3 01/31/2023    MCHC 33.0 06/07/2022    MCHC 33.8 10/28/2021     CMP:  Lab Results   Component Value Date    GLU 85 01/31/2023    CALCIUM 8.8 01/31/2023    ALBUMIN 3.8 01/31/2023    PROT 7.4 01/31/2023     01/31/2023    K 3.7 01/31/2023    CO2 26 01/31/2023     01/31/2023    BUN 13 01/31/2023    ALKPHOS 66 01/31/2023    ALT 19 01/31/2023    AST 27 01/31/2023    BILITOT 0.7 01/31/2023    BILITOT 0.9 06/07/2022    BILITOT 0.5 10/28/2021     URINALYSIS:  Lab Results   Component Value Date    COLORU Yellow 07/22/2022    SPECGRAV 1.020 07/22/2022    PHUR 6.0 07/22/2022    PROTEINUA Negative 07/22/2022    BACTERIA Occasional 07/22/2022    NITRITE Negative 07/22/2022    LEUKOCYTESUR Negative 07/22/2022    UROBILINOGEN Negative 07/22/2022      LIPIDS:  Lab Results   Component Value Date    TSH 2.496 06/07/2022    TSH 2.229 06/07/2021    TSH 1.985 01/10/2020    HDL 46 06/07/2022    HDL 51 06/07/2021    HDL 52 10/22/2020    CHOL 148 06/07/2022    CHOL 138 06/07/2021    CHOL 146 10/22/2020    TRIG 53 06/07/2022    TRIG 34 06/07/2021    TRIG 58 10/22/2020    LDLCALC 91.4 06/07/2022    LDLCALC 80.2 06/07/2021    LDLCALC 82.4 10/22/2020    CHOLHDL 31.1 06/07/2022    CHOLHDL 37.0 06/07/2021    CHOLHDL 35.6 10/22/2020    NONHDLCHOL 102 06/07/2022    NONHDLCHOL 87 06/07/2021    NONHDLCHOL 94 10/22/2020    TOTALCHOLEST 3.2 06/07/2022    TOTALCHOLEST 2.7 06/07/2021    TOTALCHOLEST 2.8 10/22/2020     TSH:  Lab Results   Component Value Date    TSH 2.496 06/07/2022    TSH 2.229 06/07/2021    TSH 1.985  01/10/2020     A1C:  Lab Results   Component Value Date    HGBA1C 5.8 (H) 12/07/2022    HGBA1C 5.7 (H) 06/07/2022    HGBA1C 5.6 06/07/2021    HGBA1C 5.7 (H) 10/22/2020    HGBA1C 5.7 (H) 01/10/2020    HGBA1C 5.6 02/12/2019    HGBA1C 5.8 (H) 05/31/2018         Assessment/Plan     Ascencion Owens is a 67 y.o.male with:    Acute deep vein thrombosis (DVT) of popliteal vein of left lower extremity  -     Ambulatory referral/consult to Internal Medicine    History of pulmonary embolism    History of cluster headache    Essential hypertension    Family history of beta thalassemia         Health Maintenance Due   Topic Date Due    Pneumococcal Vaccines (Age 65+) (2 - PCV) 09/23/2018    Abdominal Aortic Aneurysm Screening  Never done      -Schedule an appointment with  (Internal Medicine) to establish primary doctor- appt made     -Schedule an appointment with [facility] - Hematology Oncology (Hematology and Oncology)  to discuss recent ED visit and work up for blood clots- appt made    I spent 35 minutes on the day of this encounter for preparing for, evaluating, treating, and managing this patient.        -Continue current medications and maintain follow up with specialists.  Return to clinic as needed for any concerns   No follow-ups on file.      [facility]

## 2023-02-07 ENCOUNTER — TELEPHONE (OUTPATIENT)
Dept: HEMATOLOGY/ONCOLOGY | Facility: CLINIC | Age: 68
End: 2023-02-07
Payer: MEDICARE

## 2023-02-07 ENCOUNTER — OFFICE VISIT (OUTPATIENT)
Dept: HEMATOLOGY/ONCOLOGY | Facility: CLINIC | Age: 68
End: 2023-02-07
Payer: MEDICARE

## 2023-02-07 VITALS
OXYGEN SATURATION: 95 % | SYSTOLIC BLOOD PRESSURE: 139 MMHG | BODY MASS INDEX: 32.43 KG/M2 | RESPIRATION RATE: 16 BRPM | TEMPERATURE: 98 F | WEIGHT: 218.94 LBS | HEART RATE: 71 BPM | DIASTOLIC BLOOD PRESSURE: 66 MMHG | HEIGHT: 69 IN

## 2023-02-07 DIAGNOSIS — I82.432 ACUTE DEEP VEIN THROMBOSIS (DVT) OF POPLITEAL VEIN OF LEFT LOWER EXTREMITY: Primary | ICD-10-CM

## 2023-02-07 PROCEDURE — 99205 PR OFFICE/OUTPT VISIT, NEW, LEVL V, 60-74 MIN: ICD-10-PCS | Mod: HCNC,S$GLB,, | Performed by: INTERNAL MEDICINE

## 2023-02-07 PROCEDURE — 1101F PR PT FALLS ASSESS DOC 0-1 FALLS W/OUT INJ PAST YR: ICD-10-PCS | Mod: HCNC,CPTII,S$GLB, | Performed by: INTERNAL MEDICINE

## 2023-02-07 PROCEDURE — 3075F SYST BP GE 130 - 139MM HG: CPT | Mod: HCNC,CPTII,S$GLB, | Performed by: INTERNAL MEDICINE

## 2023-02-07 PROCEDURE — 3078F PR MOST RECENT DIASTOLIC BLOOD PRESSURE < 80 MM HG: ICD-10-PCS | Mod: HCNC,CPTII,S$GLB, | Performed by: INTERNAL MEDICINE

## 2023-02-07 PROCEDURE — 3288F FALL RISK ASSESSMENT DOCD: CPT | Mod: HCNC,CPTII,S$GLB, | Performed by: INTERNAL MEDICINE

## 2023-02-07 PROCEDURE — 3008F BODY MASS INDEX DOCD: CPT | Mod: HCNC,CPTII,S$GLB, | Performed by: INTERNAL MEDICINE

## 2023-02-07 PROCEDURE — 1159F PR MEDICATION LIST DOCUMENTED IN MEDICAL RECORD: ICD-10-PCS | Mod: HCNC,CPTII,S$GLB, | Performed by: INTERNAL MEDICINE

## 2023-02-07 PROCEDURE — 1126F AMNT PAIN NOTED NONE PRSNT: CPT | Mod: HCNC,CPTII,S$GLB, | Performed by: INTERNAL MEDICINE

## 2023-02-07 PROCEDURE — 99999 PR PBB SHADOW E&M-EST. PATIENT-LVL III: ICD-10-PCS | Mod: PBBFAC,HCNC,, | Performed by: INTERNAL MEDICINE

## 2023-02-07 PROCEDURE — 99205 OFFICE O/P NEW HI 60 MIN: CPT | Mod: HCNC,S$GLB,, | Performed by: INTERNAL MEDICINE

## 2023-02-07 PROCEDURE — 1159F MED LIST DOCD IN RCRD: CPT | Mod: HCNC,CPTII,S$GLB, | Performed by: INTERNAL MEDICINE

## 2023-02-07 PROCEDURE — 1101F PT FALLS ASSESS-DOCD LE1/YR: CPT | Mod: HCNC,CPTII,S$GLB, | Performed by: INTERNAL MEDICINE

## 2023-02-07 PROCEDURE — 3288F PR FALLS RISK ASSESSMENT DOCUMENTED: ICD-10-PCS | Mod: HCNC,CPTII,S$GLB, | Performed by: INTERNAL MEDICINE

## 2023-02-07 PROCEDURE — 3008F PR BODY MASS INDEX (BMI) DOCUMENTED: ICD-10-PCS | Mod: HCNC,CPTII,S$GLB, | Performed by: INTERNAL MEDICINE

## 2023-02-07 PROCEDURE — 99999 PR PBB SHADOW E&M-EST. PATIENT-LVL III: CPT | Mod: PBBFAC,HCNC,, | Performed by: INTERNAL MEDICINE

## 2023-02-07 PROCEDURE — 1126F PR PAIN SEVERITY QUANTIFIED, NO PAIN PRESENT: ICD-10-PCS | Mod: HCNC,CPTII,S$GLB, | Performed by: INTERNAL MEDICINE

## 2023-02-07 PROCEDURE — 3078F DIAST BP <80 MM HG: CPT | Mod: HCNC,CPTII,S$GLB, | Performed by: INTERNAL MEDICINE

## 2023-02-07 PROCEDURE — 3075F PR MOST RECENT SYSTOLIC BLOOD PRESS GE 130-139MM HG: ICD-10-PCS | Mod: HCNC,CPTII,S$GLB, | Performed by: INTERNAL MEDICINE

## 2023-02-07 NOTE — PROGRESS NOTES
PATIENT: Ascencion Owens  MRN: [id number]  DATE: 2/7/2023    Diagnosis:   1. Acute deep vein thrombosis (DVT) of popliteal vein of left lower extremity        Chief Complaint: Deep Vein Thrombosis      Subjective:    History of Present Illness: Mr. Owens is a 67 y.o. male who presents for evaluation and management of recent acute DVT. 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 He developed acute onset of left leg pain and swelling and presented to the ED where ultrasound was performed and showed thrombus in the popliteal, peroneal, and posterior tibial veins. He was started on Eliquis and states that the swelling has decreased since starting that but has not disappeared entirely. His last colonoscopy was in 2015 and a 10-year interval screening exam was recommended so he is up to date on that; he is a nonsmoker; he has had normal PSA testing in recent past. He has no constitutional or B symptoms. He notes that he has been told that he has thick blood on multiple occasions when having blood drawn. He denies family history of blood clotting issues. He had labs done when he had clots in 2011 showing normal PT gene and normal FV.      Past medical, surgical, family, and social histories have been reviewed and updated below.    Past Medical History:   Past Medical History:   Diagnosis Date    Acute bronchitis     Arthritis     CKD (chronic kidney disease)     Cluster headache     Dyslipidemia     Essential hypertension, benign     Hypercholesteremia     Obesity        Past Surgical History:   Past Surgical History:   Procedure Laterality Date    APPENDECTOMY      COLONOSCOPY  01/01/2004    FRACTURE SURGERY  Ankle [employer] football    lt ankle surgery  01/01/1973    VASECTOMY  Around 1988       Family History:   Family History   Problem Relation Age of Onset    Diabetes Mother      "Hypertension Mother     Heart disease Mother     Arthritis Mother     Arthritis Father     Arthritis Sister        Social History:  reports that he quit smoking about 13 years ago. His smoking use included cigars. He started smoking about 19 years ago. He has never used smokeless tobacco. He reports current alcohol use of about 1.0 standard drink per week. He reports that he does not use drugs.    Allergies:  Review of patient's allergies indicates:   Allergen Reactions    Perfume Rash     Allergic to certain cologne fragrance - rash certain area of the body        Medications:  Current Outpatient Medications   Medication Sig Dispense Refill    amLODIPine (NORVASC) 10 MG tablet TAKE 1 TABLET (10 MG TOTAL) BY MOUTH ONCE DAILY. 90 tablet 0    apixaban (ELIQUIS) 5 mg Tab Take 2 tablets (10 mg total) by mouth 2 (two) times daily for 7 days, THEN 1 tablet (5 mg total) 2 (two) times daily. 84 tablet 0    atorvastatin (LIPITOR) 20 MG tablet TAKE 1 TABLET EVERY DAY 90 tablet 3    tiZANidine (ZANAFLEX) 4 MG tablet TAKE 1 TO 2 TABLETS BY MOUTH NIGHTLY AS NEEDED FOR PAIN 180 tablet 2    aspirin (ECOTRIN) 81 MG EC tablet Take 81 mg by mouth once daily.      FLUCELVAX QUAD 3405-3017, PF, 60 mcg (15 mcg x 4)/0.5 mL Syrg ADM 0.5ML IM UTD      ketoconazole (NIZORAL) 2 % cream Apply topically once daily. 60 g 0     No current facility-administered medications for this visit.       ECOG Performance Status:   ECOG SCORE    0 - Fully active-able to carry on all pre-disease performance without restriction         Objective:      Vitals:   Vitals:    02/07/23 1608   BP: 139/66   BP Location: Right arm   Patient Position: Sitting   BP Method: Large (Automatic)   Pulse: 71   Resp: 16   Temp: 98.2 °F (36.8 °C)   TempSrc: Oral   SpO2: 95%   Weight: 99.3 kg (218 lb 14.7 oz)   Height: 5' 9" (1.753 m)     BMI: Body mass index is 32.33 kg/m².    Physical Exam  Vitals and nursing note reviewed.   Constitutional:       General: He is not in acute " distress.     Appearance: Normal appearance. He is normal weight. He is not toxic-appearing.   HENT:      Head: Normocephalic and atraumatic.   Eyes:      General: No scleral icterus.     Conjunctiva/sclera: Conjunctivae normal.   Cardiovascular:      Rate and Rhythm: Normal rate.   Pulmonary:      Effort: Pulmonary effort is normal. No respiratory distress.   Musculoskeletal:         General: No deformity.      Cervical back: Neck supple.   Lymphadenopathy:      Cervical: No cervical adenopathy.   Skin:     Coloration: Skin is not jaundiced.      Findings: No erythema.   Neurological:      General: No focal deficit present.      Mental Status: He is alert and oriented to person, place, and time. Mental status is at baseline.   Psychiatric:         Mood and Affect: Mood normal.         Behavior: Behavior normal.         Thought Content: Thought content normal.       Laboratory Data:  Labs have been reviewed.        Imaging: US Lower Extremity Veins Left  Order: [id number]  Status: Final result     Visible to patient: Yes (seen)     Next appt: 02/08/2023 at 01:40 PM in Family Medicine (Eduar Meng MD)     Dx: Leg swelling     0 Result Notes  Details    Reading Physician Reading Date Result Priority   Bita Schneider MD  [phone]  [phone] 1/31/2023 STAT     Narrative & Impression  EXAMINATION:  US LOWER EXTREMITY VEINS LEFT     CLINICAL HISTORY:  Other specified soft tissue disorders     TECHNIQUE:  Duplex and color flow Doppler evaluation and graded compression of the left lower extremity veins was performed.     COMPARISON:  04/16/2011     FINDINGS:  Left thigh veins: There is thrombus in the left popliteal vein.  The common femoral, femoral, upper greater saphenous, and deep femoral veins are patent and free of thrombus. The veins are normally compressible and have normal phasic flow and augmentation response.     Left calf veins: No thrombus is seen in the anterior tibial veins.  There is  thrombus seen in the posterior tibial and peroneal veins.     Contralateral CFV: The contralateral (right) common femoral vein is patent and free of thrombus.     Miscellaneous: None     Impression:     Deep vein thrombus in the left popliteal, peroneal, and posterior tibial veins.     This report was flagged in Epic as abnormal.        Electronically signed by: Bita Schneider  Date:                                            01/31/2023  Time:                                           21:09     Results    Collected Updated Procedure    01/31/2023 1928 01/31/2023 1955 Protime-INR [[id number]]   Blood    Component Value Units   Prothrombin Time 10.9 sec   INR 1.0            01/31/2023 1928 01/31/2023 1956 Comprehensive metabolic panel [[id number]]   Blood    Component Value Units   Sodium 144 mmol/L   Potassium 3.7 mmol/L   Chloride 105 mmol/L   CO2 26 mmol/L   Glucose 85 mg/dL   BUN 13 mg/dL   Creatinine 1.0 mg/dL   Calcium 8.8 mg/dL   Total Protein 7.4 g/dL   Albumin 3.8 g/dL   Total Bilirubin 0.7  mg/dL   Alkaline Phosphatase 66 U/L   AST 27 U/L   ALT 19 U/L   Anion Gap 13 mmol/L   eGFR >60 mL/min/1.73 m^2          01/31/2023 1928 01/31/2023 1934 CBC auto differential [[id number]]   (Abnormal)   Blood    Component Value Units   WBC 8.03 K/uL   RBC 5.20 M/uL   Hemoglobin 14.5 g/dL   Hematocrit 42.3 %   MCV 81 Low  fL   MCH 27.9 pg   MCHC 34.3 g/dL   RDW 13.8 %   Platelets 161 K/uL   MPV 10.1 fL   Immature Granulocytes 0.1 %   Gran # (ANC) 4.8 K/uL   Immature Grans (Abs) 0.01  K/uL   Lymph # 2.0 K/uL   Mono # 0.9 K/uL   Eos # 0.3 K/uL   Baso # 0.05 K/uL   nRBC 0 /100 WBC   Gran % 59.9 %   Lymph % 25.4 %   Mono % 10.6 %   Eosinophil % 3.4 %   Basophil % 0.6 %   Differential Method Automated             Assessment:       1. Acute deep vein thrombosis (DVT) of popliteal vein of left lower extremity      Recurrent VTE, unprovoked.   No family history, and given age, it seems unlikely an inherited thrombophilia is  responsible. Furthermore, he had negative tests for FVL and PT gene mutation.  Up to date on age-appropriate cancer screening.     Plan:     Indefinite anticoagulation. Will order limited thrombophilia workup to include APS serology and homocysteine levels which, if abnormal, might reasonably impact treatment planning.         Shahbaz Sexton MD, FACP  Hematology/Oncology  [facility] [address], [address]  [place] [address]  Phone: [phone]  Fax: [phone]    Total time of this visit, including time spent face to face with patient and/or via video/audio, and also in preparing for today's visit for MDM and documentation. (Medical Decision Making, including consideration of possible diagnoses, management options, complex medical record review, review of diagnostic tests and information, consideration and discussion of significant complications based on comorbidities, and discussion with providers involved with the care of the patient) 60 minutes. Greater than 50% was spent face to face with the patient counseling and coordinating care.

## 2023-02-07 NOTE — TELEPHONE ENCOUNTER
----- Message from Heather Ta sent at 2/7/2023  3:19 PM CST -----  Type:  Needs Medical Advice    Who Called: pt  Symptoms (please be specific): pt has a appointment scheduled for 02/07/23 @3:30 pt will be 15 minutes late      Would the patient rather a call back or a response via [facility]? call  Best Call Back Number: [phone]  Additional Information:          No

## 2023-02-08 ENCOUNTER — TELEPHONE (OUTPATIENT)
Dept: FAMILY MEDICINE | Facility: CLINIC | Age: 68
End: 2023-02-08
Payer: MEDICARE

## 2023-02-08 NOTE — TELEPHONE ENCOUNTER
Left voice message and informed patient that we have to reschedule appointment. Informed patient to call back or reschedule in the portal. [facility] message sent.

## 2023-02-15 ENCOUNTER — CLINICAL SUPPORT (OUTPATIENT)
Dept: REHABILITATION | Facility: OTHER | Age: 68
End: 2023-02-15
Attending: STUDENT IN AN ORGANIZED HEALTH CARE EDUCATION/TRAINING PROGRAM
Payer: MEDICARE

## 2023-02-15 ENCOUNTER — PATIENT MESSAGE (OUTPATIENT)
Dept: HEMATOLOGY/ONCOLOGY | Facility: CLINIC | Age: 68
End: 2023-02-15
Payer: MEDICARE

## 2023-02-15 DIAGNOSIS — I82.432 ACUTE DEEP VEIN THROMBOSIS (DVT) OF POPLITEAL VEIN OF LEFT LOWER EXTREMITY: Primary | ICD-10-CM

## 2023-02-15 PROBLEM — R27.9 LACK OF COORDINATION: Status: RESOLVED | Noted: 2022-08-25 | Resolved: 2023-02-15

## 2023-02-15 PROCEDURE — 97530 THERAPEUTIC ACTIVITIES: CPT | Mod: HCNC

## 2023-02-15 NOTE — PROGRESS NOTES
Pelvic Health Physical Therapy   Recertification Note     Name: Ascencion Owens  Clinic Number: [id number]    Therapy Diagnosis:   No diagnosis found.      Physician: Teresa Porter MD    Visit Date: 2/15/2023    Physician Orders: PT Eval and Treat  Medical Diagnosis from Referral: urinary frequency  Evaluation Date: 8/24/2022  Authorization Period Expiration: 6 visits 9/8/22 until 12/14/22  Plan of Care Expiration: 2/1/23 to 2/15/23  Visit # / Visits authorized: 6/ 6    Cancelled Visits: 0  No Show Visits: 0  FOTO: 1    Time In: 1115   Time Out:  1140  Total Billable Time: 25 minutes    Precautions: Standard    Subjective     Pt reports: Has noticed that daytime frequency related to drinking a lot of fluid in short periods of time. Also more sure that biggest trigger for him at night is eating sweets. On days just drinks water is much better, gets up twice. So has learned he needs to keep the pelvic floor muscles relaxed and stay away from triggers. Had some increased L shoulder pain after stretching we did here and thinks it irritated the tendon. He went and got a cortisone shot and stopped weight lifting. Its not completely resolved but is better.    He was compliant with home exercise program.  Response to previous treatment: felt fine  Functional change: no change    Objective   Pt verbally consents to intrarectal treatment today.  Signed consent form already on file.    Ascencion received therapeutic exercises to develop  strength, endurance, ROM, flexibility, posture, and core stabilization for 00 minutes including:   PPU x 10 dynamic with hold  Doorway stretch, 60 sec x 2  Hip flexor stretch, 60 sec x 2  Cat-cow, 2 x 10  P/l extensions x 10  Deadlifts, 2 x 10 with 10#    Ascencion received the following manual therapy techniques: to develop flexibility and extensibility for 00 minutes including: myofascial release of pelvic floor mm  intrarectally    Ascencion participated in neuromuscular re-education  activities to develop Coordination, Control, Down training, and Proprioception for 00 minutes includin breathing - practiced 4 sec inhale/6 sec exhalation - TPUS; superficial pelvic floor mm contractions    Ascencion participated in dynamic functional therapeutic activities to improve functional performance for 25 minutes, including:  Discussed ongoing POC      Home Exercises Provided and Patient Education Provided     Education provided:   - anatomy/physiology of pelvic floor and diaphragmatic breathing  Discussed progression of plan of care with patient; educated pt in activity modification; reviewed HEP with pt. Pt demonstrated and verbalized understanding of all instruction and was provided with a handout of HEP (see Patient Instructions).    Written Home Exercises Provided: yes.  Exercises were reviewed and Ascencion was able to demonstrate them prior to the end of the session.  Ascencion demonstrated good  understanding of the education provided.     See EMR under Patient Instructions for exercises provided 2022.    Assessment   Pt returns after hiatus and states he feels indep with management of his symptoms at this time. If he has high levels of sugar intake nocturia is worse. When only drinks water, nocturia has improved to 2x/night. Pelvic floor relaxation, stretching and general relaxation also helpful. Reviewed his home program and pt will be discharged at this time.  Ascencion Is progressing well towards his goals.   Pt prognosis is Excellent.     Pt will continue to benefit from skilled outpatient physical therapy to address the deficits listed in the problem list box on initial evaluation, provide pt/family education and to maximize pt's level of independence in the home and community environment.     Pt's spiritual, cultural and educational needs considered and pt agreeable to plan of care and goals.     Anticipated barriers to physical therapy: none    Goals: Short Term Goals: 4 weeks   Pt indep  in HEP MET  Pt able to wait at least 2 hours between trips to the bathroom for improved participation in ADLs MET  Pt demo pelvic floor mm strength at least 3/5 for improved continence and support of pelvic organs MET  Nocturia no more than 3x/night for improved quality of sleep MET     Long Term Goals: 8 weeks   Pt indep in progressive HEP MET  Pt reports 0 episodes of leakage in at least 2 weeks for improved ADL participation MET  Pt able to wait at least 2.5-4 hours between trips to the bathroom  MET  Nocturia no more than 1x/night for improved quality of sleep NOT MET (2x/night)  Pt demo pelvic floor mm strength at least 3+/5 for improved continence and support of pelvic organs MET        Plan      Plan of care Certification:  2/1/23 to 2/15/23     Outpatient Physical Therapy 1 times weekly for 2 weeks to include the following interventions: therapeutic exercises, therapeutic activity, neuromuscular re-education, manual therapy, patient/family education and self care/home management    Next visit:plan is to discharge    Cornelia Driver, PT

## 2023-02-15 NOTE — PLAN OF CARE
[facility]  Physical Therapy Discharge Note    Name: Ascencion Owens  Clinic Number: [id number]    Therapy Diagnosis: No diagnosis found.  Physician: Teresa Porter MD    Physician Orders: PT Eval and Treat  Medical Diagnosis from Referral: urinary frequency  Evaluation Date: 8/24/2022      Date of Last visit: 2/15/23  Total Visits Received: 6    ASSESSMENT      Pt returns after hiatus and states he feels indep with management of his symptoms at this time. If he has high levels of sugar intake nocturia is worse. When only drinks water, nocturia has improved to 2x/night. Pelvic floor relaxation, stretching and general relaxation also helpful. Reviewed his home program and pt will be discharged at this time.    Discharge reason: Patient has met all of his/her goals      Goals: Short Term Goals: 4 weeks   Pt indep in HEP MET  Pt able to wait at least 2 hours between trips to the bathroom for improved participation in ADLs MET  Pt demo pelvic floor mm strength at least 3/5 for improved continence and support of pelvic organs MET  Nocturia no more than 3x/night for improved quality of sleep MET     Long Term Goals: 8 weeks   Pt indep in progressive HEP MET  Pt reports 0 episodes of leakage in at least 2 weeks for improved ADL participation MET  Pt able to wait at least 2.5-4 hours between trips to the bathroom  MET  Nocturia no more than 1x/night for improved quality of sleep NOT MET (2x/night)  Pt demo pelvic floor mm strength at least 3+/5 for improved continence and support of pelvic organs MET    PLAN   This patient is discharged from Physical Therapy      Cornelia Driver, PT

## 2023-02-20 ENCOUNTER — PATIENT MESSAGE (OUTPATIENT)
Dept: HEMATOLOGY/ONCOLOGY | Facility: CLINIC | Age: 68
End: 2023-02-20
Payer: MEDICARE

## 2023-03-07 ENCOUNTER — PATIENT MESSAGE (OUTPATIENT)
Dept: HEMATOLOGY/ONCOLOGY | Facility: CLINIC | Age: 68
End: 2023-03-07
Payer: MEDICARE

## 2023-03-07 DIAGNOSIS — I82.432 ACUTE DEEP VEIN THROMBOSIS (DVT) OF POPLITEAL VEIN OF LEFT LOWER EXTREMITY: Primary | ICD-10-CM

## 2023-03-09 ENCOUNTER — PATIENT MESSAGE (OUTPATIENT)
Dept: HEMATOLOGY/ONCOLOGY | Facility: CLINIC | Age: 68
End: 2023-03-09
Payer: MEDICARE

## 2023-03-13 ENCOUNTER — OFFICE VISIT (OUTPATIENT)
Dept: FAMILY MEDICINE | Facility: CLINIC | Age: 68
End: 2023-03-13
Payer: MEDICARE

## 2023-03-13 ENCOUNTER — LAB VISIT (OUTPATIENT)
Dept: LAB | Facility: HOSPITAL | Age: 68
End: 2023-03-13
Attending: INTERNAL MEDICINE
Payer: MEDICARE

## 2023-03-13 VITALS
HEART RATE: 70 BPM | SYSTOLIC BLOOD PRESSURE: 122 MMHG | RESPIRATION RATE: 18 BRPM | HEIGHT: 69 IN | BODY MASS INDEX: 32.3 KG/M2 | TEMPERATURE: 98 F | DIASTOLIC BLOOD PRESSURE: 78 MMHG | OXYGEN SATURATION: 96 % | WEIGHT: 218.06 LBS

## 2023-03-13 DIAGNOSIS — Z86.711 HISTORY OF PULMONARY EMBOLISM: ICD-10-CM

## 2023-03-13 DIAGNOSIS — E66.09 CLASS 1 OBESITY DUE TO EXCESS CALORIES WITH SERIOUS COMORBIDITY AND BODY MASS INDEX (BMI) OF 32.0 TO 32.9 IN ADULT: ICD-10-CM

## 2023-03-13 DIAGNOSIS — E78.5 HYPERLIPIDEMIA, ACQUIRED: ICD-10-CM

## 2023-03-13 DIAGNOSIS — E55.9 VITAMIN D INSUFFICIENCY: ICD-10-CM

## 2023-03-13 DIAGNOSIS — I10 ESSENTIAL HYPERTENSION: ICD-10-CM

## 2023-03-13 DIAGNOSIS — Z12.5 PROSTATE CANCER SCREENING: ICD-10-CM

## 2023-03-13 DIAGNOSIS — Z13.6 ENCOUNTER FOR ABDOMINAL AORTIC ANEURYSM (AAA) SCREENING: ICD-10-CM

## 2023-03-13 DIAGNOSIS — Z91.89 PNEUMOCOCCAL VACCINATION INDICATED: ICD-10-CM

## 2023-03-13 DIAGNOSIS — Z00.00 ANNUAL PHYSICAL EXAM: Primary | ICD-10-CM

## 2023-03-13 DIAGNOSIS — R73.03 PREDIABETES: ICD-10-CM

## 2023-03-13 DIAGNOSIS — Z00.00 ANNUAL PHYSICAL EXAM: ICD-10-CM

## 2023-03-13 DIAGNOSIS — I82.432 ACUTE DEEP VEIN THROMBOSIS (DVT) OF POPLITEAL VEIN OF LEFT LOWER EXTREMITY: ICD-10-CM

## 2023-03-13 LAB
25(OH)D3+25(OH)D2 SERPL-MCNC: 64 NG/ML (ref 30–96)
ALBUMIN SERPL BCP-MCNC: 3.7 G/DL (ref 3.5–5.2)
ALP SERPL-CCNC: 64 U/L (ref 55–135)
ALT SERPL W/O P-5'-P-CCNC: 17 U/L (ref 10–44)
ANION GAP SERPL CALC-SCNC: 10 MMOL/L (ref 8–16)
AST SERPL-CCNC: 18 U/L (ref 10–40)
BASOPHILS # BLD AUTO: 0.04 K/UL (ref 0–0.2)
BASOPHILS NFR BLD: 0.6 % (ref 0–1.9)
BILIRUB SERPL-MCNC: 0.6 MG/DL (ref 0.1–1)
BUN SERPL-MCNC: 13 MG/DL (ref 8–23)
CALCIUM SERPL-MCNC: 9.5 MG/DL (ref 8.7–10.5)
CHLORIDE SERPL-SCNC: 107 MMOL/L (ref 95–110)
CHOLEST SERPL-MCNC: 155 MG/DL (ref 120–199)
CHOLEST/HDLC SERPL: 3 {RATIO} (ref 2–5)
CO2 SERPL-SCNC: 28 MMOL/L (ref 23–29)
COMPLEXED PSA SERPL-MCNC: 0.45 NG/ML (ref 0–4)
CREAT SERPL-MCNC: 1.1 MG/DL (ref 0.5–1.4)
D DIMER PPP IA.FEU-MCNC: <0.19 MG/L FEU
DIFFERENTIAL METHOD: NORMAL
EOSINOPHIL # BLD AUTO: 0.1 K/UL (ref 0–0.5)
EOSINOPHIL NFR BLD: 1.9 % (ref 0–8)
ERYTHROCYTE [DISTWIDTH] IN BLOOD BY AUTOMATED COUNT: 14.5 % (ref 11.5–14.5)
EST. GFR  (NO RACE VARIABLE): >60 ML/MIN/1.73 M^2
ESTIMATED AVG GLUCOSE: 114 MG/DL (ref 68–131)
GLUCOSE SERPL-MCNC: 91 MG/DL (ref 70–110)
HBA1C MFR BLD: 5.6 % (ref 4–5.6)
HCT VFR BLD AUTO: 45.4 % (ref 40–54)
HDLC SERPL-MCNC: 52 MG/DL (ref 40–75)
HDLC SERPL: 33.5 % (ref 20–50)
HGB BLD-MCNC: 14.8 G/DL (ref 14–18)
IMM GRANULOCYTES # BLD AUTO: 0.01 K/UL (ref 0–0.04)
IMM GRANULOCYTES NFR BLD AUTO: 0.2 % (ref 0–0.5)
LDLC SERPL CALC-MCNC: 94 MG/DL (ref 63–159)
LYMPHOCYTES # BLD AUTO: 1.7 K/UL (ref 1–4.8)
LYMPHOCYTES NFR BLD: 26.2 % (ref 18–48)
MCH RBC QN AUTO: 27 PG (ref 27–31)
MCHC RBC AUTO-ENTMCNC: 32.6 G/DL (ref 32–36)
MCV RBC AUTO: 83 FL (ref 82–98)
MONOCYTES # BLD AUTO: 0.7 K/UL (ref 0.3–1)
MONOCYTES NFR BLD: 11.1 % (ref 4–15)
NEUTROPHILS # BLD AUTO: 3.8 K/UL (ref 1.8–7.7)
NEUTROPHILS NFR BLD: 60 % (ref 38–73)
NONHDLC SERPL-MCNC: 103 MG/DL
NRBC BLD-RTO: 0 /100 WBC
PLATELET # BLD AUTO: 205 K/UL (ref 150–450)
PMV BLD AUTO: 11.2 FL (ref 9.2–12.9)
POTASSIUM SERPL-SCNC: 4.2 MMOL/L (ref 3.5–5.1)
PROT SERPL-MCNC: 7.2 G/DL (ref 6–8.4)
RBC # BLD AUTO: 5.49 M/UL (ref 4.6–6.2)
SODIUM SERPL-SCNC: 145 MMOL/L (ref 136–145)
TRIGL SERPL-MCNC: 45 MG/DL (ref 30–150)
TSH SERPL DL<=0.005 MIU/L-ACNC: 1.34 UIU/ML (ref 0.4–4)
WBC # BLD AUTO: 6.3 K/UL (ref 3.9–12.7)

## 2023-03-13 PROCEDURE — 36415 COLL VENOUS BLD VENIPUNCTURE: CPT | Mod: HCNC,PO | Performed by: INTERNAL MEDICINE

## 2023-03-13 PROCEDURE — 1126F AMNT PAIN NOTED NONE PRSNT: CPT | Mod: HCNC,CPTII,S$GLB, | Performed by: STUDENT IN AN ORGANIZED HEALTH CARE EDUCATION/TRAINING PROGRAM

## 2023-03-13 PROCEDURE — 3078F PR MOST RECENT DIASTOLIC BLOOD PRESSURE < 80 MM HG: ICD-10-PCS | Mod: HCNC,CPTII,S$GLB, | Performed by: STUDENT IN AN ORGANIZED HEALTH CARE EDUCATION/TRAINING PROGRAM

## 2023-03-13 PROCEDURE — 83036 HEMOGLOBIN GLYCOSYLATED A1C: CPT | Mod: HCNC | Performed by: STUDENT IN AN ORGANIZED HEALTH CARE EDUCATION/TRAINING PROGRAM

## 2023-03-13 PROCEDURE — 1126F PR PAIN SEVERITY QUANTIFIED, NO PAIN PRESENT: ICD-10-PCS | Mod: HCNC,CPTII,S$GLB, | Performed by: STUDENT IN AN ORGANIZED HEALTH CARE EDUCATION/TRAINING PROGRAM

## 2023-03-13 PROCEDURE — 99999 PR PBB SHADOW E&M-EST. PATIENT-LVL IV: ICD-10-PCS | Mod: PBBFAC,HCNC,, | Performed by: STUDENT IN AN ORGANIZED HEALTH CARE EDUCATION/TRAINING PROGRAM

## 2023-03-13 PROCEDURE — 3008F BODY MASS INDEX DOCD: CPT | Mod: HCNC,CPTII,S$GLB, | Performed by: STUDENT IN AN ORGANIZED HEALTH CARE EDUCATION/TRAINING PROGRAM

## 2023-03-13 PROCEDURE — 85379 FIBRIN DEGRADATION QUANT: CPT | Mod: HCNC | Performed by: INTERNAL MEDICINE

## 2023-03-13 PROCEDURE — 3078F DIAST BP <80 MM HG: CPT | Mod: HCNC,CPTII,S$GLB, | Performed by: STUDENT IN AN ORGANIZED HEALTH CARE EDUCATION/TRAINING PROGRAM

## 2023-03-13 PROCEDURE — 90677 PCV20 VACCINE IM: CPT | Mod: HCNC,S$GLB,, | Performed by: STUDENT IN AN ORGANIZED HEALTH CARE EDUCATION/TRAINING PROGRAM

## 2023-03-13 PROCEDURE — 1160F PR REVIEW ALL MEDS BY PRESCRIBER/CLIN PHARMACIST DOCUMENTED: ICD-10-PCS | Mod: HCNC,CPTII,S$GLB, | Performed by: STUDENT IN AN ORGANIZED HEALTH CARE EDUCATION/TRAINING PROGRAM

## 2023-03-13 PROCEDURE — 80053 COMPREHEN METABOLIC PANEL: CPT | Mod: HCNC | Performed by: STUDENT IN AN ORGANIZED HEALTH CARE EDUCATION/TRAINING PROGRAM

## 2023-03-13 PROCEDURE — 99397 PER PM REEVAL EST PAT 65+ YR: CPT | Mod: HCNC,S$GLB,, | Performed by: STUDENT IN AN ORGANIZED HEALTH CARE EDUCATION/TRAINING PROGRAM

## 2023-03-13 PROCEDURE — G0009 ADMIN PNEUMOCOCCAL VACCINE: HCPCS | Mod: HCNC,S$GLB,, | Performed by: STUDENT IN AN ORGANIZED HEALTH CARE EDUCATION/TRAINING PROGRAM

## 2023-03-13 PROCEDURE — 80061 LIPID PANEL: CPT | Mod: HCNC | Performed by: STUDENT IN AN ORGANIZED HEALTH CARE EDUCATION/TRAINING PROGRAM

## 2023-03-13 PROCEDURE — 84443 ASSAY THYROID STIM HORMONE: CPT | Mod: HCNC | Performed by: STUDENT IN AN ORGANIZED HEALTH CARE EDUCATION/TRAINING PROGRAM

## 2023-03-13 PROCEDURE — 3074F PR MOST RECENT SYSTOLIC BLOOD PRESSURE < 130 MM HG: ICD-10-PCS | Mod: HCNC,CPTII,S$GLB, | Performed by: STUDENT IN AN ORGANIZED HEALTH CARE EDUCATION/TRAINING PROGRAM

## 2023-03-13 PROCEDURE — 1101F PT FALLS ASSESS-DOCD LE1/YR: CPT | Mod: HCNC,CPTII,S$GLB, | Performed by: STUDENT IN AN ORGANIZED HEALTH CARE EDUCATION/TRAINING PROGRAM

## 2023-03-13 PROCEDURE — 1101F PR PT FALLS ASSESS DOC 0-1 FALLS W/OUT INJ PAST YR: ICD-10-PCS | Mod: HCNC,CPTII,S$GLB, | Performed by: STUDENT IN AN ORGANIZED HEALTH CARE EDUCATION/TRAINING PROGRAM

## 2023-03-13 PROCEDURE — 99999 PR PBB SHADOW E&M-EST. PATIENT-LVL IV: CPT | Mod: PBBFAC,HCNC,, | Performed by: STUDENT IN AN ORGANIZED HEALTH CARE EDUCATION/TRAINING PROGRAM

## 2023-03-13 PROCEDURE — 3008F PR BODY MASS INDEX (BMI) DOCUMENTED: ICD-10-PCS | Mod: HCNC,CPTII,S$GLB, | Performed by: STUDENT IN AN ORGANIZED HEALTH CARE EDUCATION/TRAINING PROGRAM

## 2023-03-13 PROCEDURE — 99397 PR PREVENTIVE VISIT,EST,65 & OVER: ICD-10-PCS | Mod: HCNC,S$GLB,, | Performed by: STUDENT IN AN ORGANIZED HEALTH CARE EDUCATION/TRAINING PROGRAM

## 2023-03-13 PROCEDURE — G0009 PNEUMOCOCCAL CONJUGATE VACCINE 20-VALENT: ICD-10-PCS | Mod: HCNC,S$GLB,, | Performed by: STUDENT IN AN ORGANIZED HEALTH CARE EDUCATION/TRAINING PROGRAM

## 2023-03-13 PROCEDURE — 1159F PR MEDICATION LIST DOCUMENTED IN MEDICAL RECORD: ICD-10-PCS | Mod: HCNC,CPTII,S$GLB, | Performed by: STUDENT IN AN ORGANIZED HEALTH CARE EDUCATION/TRAINING PROGRAM

## 2023-03-13 PROCEDURE — 1160F RVW MEDS BY RX/DR IN RCRD: CPT | Mod: HCNC,CPTII,S$GLB, | Performed by: STUDENT IN AN ORGANIZED HEALTH CARE EDUCATION/TRAINING PROGRAM

## 2023-03-13 PROCEDURE — 90677 PNEUMOCOCCAL CONJUGATE VACCINE 20-VALENT: ICD-10-PCS | Mod: HCNC,S$GLB,, | Performed by: STUDENT IN AN ORGANIZED HEALTH CARE EDUCATION/TRAINING PROGRAM

## 2023-03-13 PROCEDURE — 1159F MED LIST DOCD IN RCRD: CPT | Mod: HCNC,CPTII,S$GLB, | Performed by: STUDENT IN AN ORGANIZED HEALTH CARE EDUCATION/TRAINING PROGRAM

## 2023-03-13 PROCEDURE — 3288F PR FALLS RISK ASSESSMENT DOCUMENTED: ICD-10-PCS | Mod: HCNC,CPTII,S$GLB, | Performed by: STUDENT IN AN ORGANIZED HEALTH CARE EDUCATION/TRAINING PROGRAM

## 2023-03-13 PROCEDURE — 82306 VITAMIN D 25 HYDROXY: CPT | Mod: HCNC | Performed by: STUDENT IN AN ORGANIZED HEALTH CARE EDUCATION/TRAINING PROGRAM

## 2023-03-13 PROCEDURE — 3074F SYST BP LT 130 MM HG: CPT | Mod: HCNC,CPTII,S$GLB, | Performed by: STUDENT IN AN ORGANIZED HEALTH CARE EDUCATION/TRAINING PROGRAM

## 2023-03-13 PROCEDURE — 85025 COMPLETE CBC W/AUTO DIFF WBC: CPT | Mod: HCNC | Performed by: STUDENT IN AN ORGANIZED HEALTH CARE EDUCATION/TRAINING PROGRAM

## 2023-03-13 PROCEDURE — 84153 ASSAY OF PSA TOTAL: CPT | Mod: HCNC | Performed by: STUDENT IN AN ORGANIZED HEALTH CARE EDUCATION/TRAINING PROGRAM

## 2023-03-13 PROCEDURE — 3288F FALL RISK ASSESSMENT DOCD: CPT | Mod: HCNC,CPTII,S$GLB, | Performed by: STUDENT IN AN ORGANIZED HEALTH CARE EDUCATION/TRAINING PROGRAM

## 2023-03-13 NOTE — PROGRESS NOTES
SUBJECTIVE     Chief Complaint   Patient presents with    Annual Exam       HPI  Ascencion Owens is a 67 y.o. male with  PE, h/o cluster headaches, HTN, HLD, CKD, arthritis, prediabetes  that presents for annual exam and establishment of care.    Pt is established to this clinic with my colleague Dr. Lombard, but this is my first time seeing Mr. Owens.    Overall feeling well without complaint. Pt reports that he used to smoke cigars, has not smoked in 12 years.    PAST MEDICAL HISTORY:  Past Medical History:   Diagnosis Date    Acute bronchitis     Arthritis     CKD (chronic kidney disease)     Cluster headache     Dyslipidemia     Essential hypertension, benign     Hypercholesteremia     Obesity        ALLERGIES AND MEDICATIONS: updated and reviewed.  Review of patient's allergies indicates:   Allergen Reactions    Perfume Rash     Allergic to certain cologne fragrance - rash certain area of the body      Current Outpatient Medications   Medication Sig Dispense Refill    amLODIPine (NORVASC) 10 MG tablet TAKE 1 TABLET (10 MG TOTAL) BY MOUTH ONCE DAILY. 90 tablet 0    apixaban (ELIQUIS) 5 mg Tab Take 1 tablet (5 mg total) by mouth 2 (two) times daily. 60 tablet 2    atorvastatin (LIPITOR) 20 MG tablet TAKE 1 TABLET EVERY DAY 90 tablet 3    tiZANidine (ZANAFLEX) 4 MG tablet TAKE 1 TO 2 TABLETS BY MOUTH NIGHTLY AS NEEDED FOR PAIN 180 tablet 2     No current facility-administered medications for this visit.       ROS  Review of Systems   Constitutional:  Negative for chills, fatigue and fever.   HENT:  Negative for rhinorrhea and sore throat.    Respiratory:  Negative for cough and shortness of breath.    Cardiovascular:  Negative for chest pain and palpitations.   Gastrointestinal:  Negative for constipation, diarrhea, nausea and vomiting.   Genitourinary:  Negative for dysuria.   Musculoskeletal:  Negative for joint swelling.   Skin:  Negative for rash and wound.   Neurological:  Negative for light-headedness  "and headaches.   Psychiatric/Behavioral:  Negative for dysphoric mood and sleep disturbance. The patient is not nervous/anxious.        OBJECTIVE     Physical Exam  Vitals:    03/13/23 1029   BP: 122/78   Pulse: 70   Resp:    Temp:     Body mass index is 32.2 kg/m².  Weight: 98.9 kg (218 lb 0.6 oz)   Height: 5' 9" (175.3 cm)     Physical Exam  Vitals reviewed.   Constitutional:       General: He is not in acute distress.  HENT:      Right Ear: External ear normal.      Left Ear: External ear normal.      Nose: Nose normal.      Mouth/Throat:      Mouth: Mucous membranes are moist.   Eyes:      Extraocular Movements: Extraocular movements intact.      Conjunctiva/sclera: Conjunctivae normal.      Pupils: Pupils are equal, round, and reactive to light.   Cardiovascular:      Rate and Rhythm: Normal rate and regular rhythm.      Pulses: Normal pulses.      Heart sounds: No murmur heard.  Pulmonary:      Effort: Pulmonary effort is normal.      Breath sounds: Normal breath sounds.   Abdominal:      General: There is no distension.      Palpations: Abdomen is soft.   Musculoskeletal:         General: No swelling. Normal range of motion.      Cervical back: Normal range of motion.   Skin:     General: Skin is warm and dry.      Findings: No rash.   Neurological:      General: No focal deficit present.      Mental Status: He is alert and oriented to person, place, and time.   Psychiatric:         Mood and Affect: Mood normal.         Behavior: Behavior normal.         Health Maintenance         Date Due Completion Date    Pneumococcal Vaccines (Age 65+) (2 - PCV) 09/23/2018 9/23/2017    Abdominal Aortic Aneurysm Screening Never done ---    Hemoglobin A1c (Prediabetes) 12/07/2023 12/7/2022    Colorectal Cancer Screening 06/10/2025 6/10/2015    Lipid Panel 06/07/2027 6/7/2022    TETANUS VACCINE 10/02/2029 10/2/2019              ASSESSMENT     67 y.o. male with     1. Annual physical exam    2. Essential hypertension    3. " Hyperlipidemia, acquired    4. History of pulmonary embolism    5. Class 1 obesity due to excess calories with serious comorbidity and body mass index (BMI) of 32.0 to 32.9 in adult    6. Prostate cancer screening    7. Prediabetes    8. Vitamin D insufficiency    9. Encounter for abdominal aortic aneurysm (AAA) screening    10. Pneumococcal vaccination indicated        PLAN:     1. Annual physical exam  - Discussed age and gender appropriate screenings at this visit and encouraged a healthy diet low in simple carbohydrates, and increased physical activity.  Counseled on medically appropriate vaccines based on age and current health condition.  Screening test reviewed and discussed with patient.   - Hemoglobin A1C; Future  - Lipid Panel; Future  - TSH; Future  - Comprehensive Metabolic Panel; Future  - CBC Auto Differential; Future    2. Essential hypertension  - Patient was counseled and encouraged to maintain a low sodium diet, as well as increasing physical activity.  Recommend random BP checks at home on a regular basis. Repeat BP at end of visit was improved. Will continue medication at this time, and follow up in 3-6 months, or sooner if blood pressure begins to increase.     - Hemoglobin A1C; Future  - Lipid Panel; Future  - TSH; Future  - Comprehensive Metabolic Panel; Future  - CBC Auto Differential; Future    3. Hyperlipidemia, acquired  - Stable, continue current regimen. Due for laboratory monitoring, ordered. Follow up 1 year.  - Hemoglobin A1C; Future  - Lipid Panel; Future  - TSH; Future  - Comprehensive Metabolic Panel; Future  - CBC Auto Differential; Future    4. History of pulmonary embolism  - Stable, continue current regimen. Due for laboratory monitoring, ordered. Follow up 1 year.  - Hemoglobin A1C; Future  - Lipid Panel; Future  - TSH; Future  - Comprehensive Metabolic Panel; Future  - CBC Auto Differential; Future    5. Class 1 obesity due to excess calories with serious comorbidity and body  mass index (BMI) of 32.0 to 32.9 in adult  - Noted; patient counseled on diet and exercise changes to maintain a healthy lifestyle.  - Hemoglobin A1C; Future  - Lipid Panel; Future  - TSH; Future  - Comprehensive Metabolic Panel; Future  - CBC Auto Differential; Future    6. Prostate cancer screening  - Due, ordered after shared decision-making with patient.  - Hemoglobin A1C; Future  - Lipid Panel; Future  - TSH; Future  - Comprehensive Metabolic Panel; Future  - CBC Auto Differential; Future  - PSA, SCREENING; Future    7. Prediabetes  - Stable, continue current regimen. Due for laboratory monitoring, ordered. Follow up 1 year.  - Hemoglobin A1C; Future  - Lipid Panel; Future  - TSH; Future  - Comprehensive Metabolic Panel; Future  - CBC Auto Differential; Future    8. Vitamin D insufficiency  - Stable, continue current regimen. Due for laboratory monitoring, ordered. Follow up 1 year.  - Hemoglobin A1C; Future  - Lipid Panel; Future  - TSH; Future  - Comprehensive Metabolic Panel; Future  - CBC Auto Differential; Future  - Vitamin D; Future    9. Encounter for abdominal aortic aneurysm (AAA) screening  - Due, ordered.  - [facility] US AAA Screening; Future    10. Pneumococcal vaccination indicated  - Due, ordered.  - (In Office Administered) Pneumococcal Conjugate Vaccine (20 Valent) (IM)        Josselin Parson MD  03/13/2023 10:08 AM        No follow-ups on file.

## 2023-03-13 NOTE — PROGRESS NOTES
Health Maintenance Due   Topic     Pneumococcal Vaccines (Age 65+) (2 - PCV) Pt agree to get today    Abdominal Aortic Aneurysm Screening  Consult pcp

## 2023-03-21 ENCOUNTER — PES CALL (OUTPATIENT)
Dept: ADMINISTRATIVE | Facility: CLINIC | Age: 68
End: 2023-03-21
Payer: MEDICARE

## 2023-04-10 ENCOUNTER — HOSPITAL ENCOUNTER (EMERGENCY)
Facility: HOSPITAL | Age: 68
Discharge: HOME | End: 2023-04-10
Attending: EMERGENCY MEDICINE
Payer: MEDICARE

## 2023-04-10 ENCOUNTER — NURSE TRIAGE (OUTPATIENT)
Dept: ADMINISTRATIVE | Facility: CLINIC | Age: 68
End: 2023-04-10
Payer: MEDICARE

## 2023-04-10 VITALS
WEIGHT: 215 LBS | SYSTOLIC BLOOD PRESSURE: 167 MMHG | HEART RATE: 68 BPM | OXYGEN SATURATION: 100 % | DIASTOLIC BLOOD PRESSURE: 74 MMHG | HEIGHT: 69 IN | RESPIRATION RATE: 18 BRPM | TEMPERATURE: 98 F | BODY MASS INDEX: 31.84 KG/M2

## 2023-04-10 DIAGNOSIS — S00.81XA FACIAL ABRASION, INITIAL ENCOUNTER: Primary | ICD-10-CM

## 2023-04-10 DIAGNOSIS — S05.11XA ORBITAL CONTUSION, RIGHT, INITIAL ENCOUNTER: ICD-10-CM

## 2023-04-10 PROCEDURE — 99284 EMERGENCY DEPT VISIT MOD MDM: CPT | Mod: 25,HCNC

## 2023-04-10 RX ORDER — HYDROCODONE BITARTRATE AND ACETAMINOPHEN 5; 325 MG/1; MG/1
1 TABLET ORAL EVERY 6 HOURS PRN
Qty: 12 TABLET | Refills: 0 | Status: SHIPPED | OUTPATIENT
Start: 2023-04-10 | End: 2023-09-14

## 2023-04-10 NOTE — ED PROVIDER NOTES
Encounter Date: 4/10/2023       History     Chief Complaint   Patient presents with    Laceration     Pt states that he had a trip and fall around noon today hitting his face on a wood pallet, pt has laceration noted near R eye with swelling noted. Pt is on a blood thinner, no LOC no blurry vision and no headache.     Chief complaint:  Laceration     History of present illness: Patient is a 67-year-old male who reports that at noon he had a trip and fall hitting his face on a wooden Pallet.  He denies loss of consciousness neck or back pain but has swelling below the right eye.  He reports he did break his glasses during the fall and is using a pair that he does not usually use but does have some vision disturbance which he thinks is due to this.  He reports a 4/10 severity pain.  States his tetanus is up-to-date but he is taking Eliquis.  He denies repeated nausea or vomiting since the time of the fall.    The history is provided by the patient. No  was used.   Review of patient's allergies indicates:   Allergen Reactions    Perfume Rash     Allergic to certain cologne fragrance - rash certain area of the body      Past Medical History:   Diagnosis Date    Acute bronchitis     Arthritis     CKD (chronic kidney disease)     Cluster headache     Dyslipidemia     Essential hypertension, benign     Hypercholesteremia     Obesity      Past Surgical History:   Procedure Laterality Date    APPENDECTOMY      COLONOSCOPY  2004    FRACTURE SURGERY  Ankle [employer] football    lt ankle surgery  1973    VASECTOMY  Around      Family History   Problem Relation Age of Onset    Diabetes Mother     Hypertension Mother     Heart disease Mother     Arthritis Mother     Arthritis Father     Arthritis Sister      Social History     Tobacco Use    Smoking status: Former     Types: Cigars     Start date:      Quit date: 2010     Years since quittin.2    Smokeless tobacco: Never    Tobacco  comments:     occ   Substance Use Topics    Alcohol use: Yes     Alcohol/week: 1.0 standard drink     Types: 1 Shots of liquor per week     Comment: occasionally    Drug use: No     Review of Systems   Constitutional:  Negative for appetite change, chills, diaphoresis, fatigue and fever.   HENT:  Negative for congestion, ear discharge, ear pain, postnasal drip, rhinorrhea, sinus pressure, sneezing, sore throat and voice change.    Eyes:  Negative for discharge, itching and visual disturbance.   Respiratory:  Negative for cough, shortness of breath and wheezing.    Cardiovascular:  Negative for chest pain, palpitations and leg swelling.   Gastrointestinal:  Negative for abdominal pain, nausea and vomiting.   Endocrine: Negative for polydipsia, polyphagia and polyuria.   Genitourinary:  Negative for difficulty urinating, dysuria, frequency, hematuria, penile discharge, penile pain, penile swelling and urgency.   Musculoskeletal:  Negative for arthralgias and myalgias.   Skin:  Positive for wound. Negative for rash.        Facial swelling under the right eye   Neurological:  Negative for dizziness, seizures, syncope and weakness.   Hematological:  Negative for adenopathy. Does not bruise/bleed easily.   Psychiatric/Behavioral:  Negative for agitation and self-injury. The patient is not nervous/anxious.      Physical Exam     Initial Vitals [04/10/23 1717]   BP Pulse Resp Temp SpO2   138/75 70 18 98 °F (36.7 °C) 98 %      MAP       --         Physical Exam    Nursing note and vitals reviewed.  Constitutional: He appears well-developed and well-nourished. He is not diaphoretic. No distress.   HENT:   Head: Normocephalic and atraumatic.       Right Ear: External ear normal.   Left Ear: External ear normal.   Nose: Nose normal.   Facial tenderness only as noted.  Eomi, perrl.  Spine is without tenderness or stepoffs.   Eyes: Pupils are equal, round, and reactive to light. Right eye exhibits no discharge. Left eye exhibits  no discharge. No scleral icterus.   Neck:   Normal range of motion.  Pulmonary/Chest: No respiratory distress.   Abdominal: He exhibits no distension.   Musculoskeletal:         General: Normal range of motion.      Cervical back: Normal range of motion.     Neurological: He is alert and oriented to person, place, and time.   Skin: Skin is dry. Capillary refill takes less than 2 seconds.       ED Course   Procedures  Labs Reviewed - No data to display       Imaging Results              CT Head Without Contrast (Final result)  Result time 04/10/23 18:49:30      Final result by Tatianna Campbell MD (04/10/23 18:49:30)                   Impression:      No acute intracranial abnormalities identified.    No acute facial fractures identified.      Electronically signed by: Tatianna Campbell MD  Date:    04/10/2023  Time:    18:49               Narrative:    EXAMINATION:  CT HEAD WITHOUT CONTRAST; CT MAXILLOFACIAL WITHOUT CONTRAST    CLINICAL HISTORY:  Head trauma, minor (Age >= 65y);; Facial trauma, blunt;    TECHNIQUE:  Low dose axial images were obtained through the head and maxillofacial region.  Coronal and sagittal reformations were also performed. Contrast was not administered.    COMPARISON:  CT head from August 2017.    FINDINGS:  No evidence of acute/recent major vascular distribution cerebral infarction, intraparenchymal hemorrhage, or intra-axial space occupying lesion. The ventricular system is normal in size and configuration with no evidence of hydrocephalus. No effacement of the skull-base cisterns. No abnormal extra-axial fluid collections or blood products.    No acute facial fractures are identified.  There is right facial and periorbital soft tissue swelling and edema.  No acute abnormalities are seen involving the orbits.  Visualized paranasal sinuses and mastoid air cells are essentially clear.  The calvarium shows no significant abnormality.                                       CT Maxillofacial  Without Contrast (Final result)  Result time 04/10/23 18:49:30      Final result by Tatianna Campbell MD (04/10/23 18:49:30)                   Impression:      No acute intracranial abnormalities identified.    No acute facial fractures identified.      Electronically signed by: Tatianna Campbell MD  Date:    04/10/2023  Time:    18:49               Narrative:    EXAMINATION:  CT HEAD WITHOUT CONTRAST; CT MAXILLOFACIAL WITHOUT CONTRAST    CLINICAL HISTORY:  Head trauma, minor (Age >= 65y);; Facial trauma, blunt;    TECHNIQUE:  Low dose axial images were obtained through the head and maxillofacial region.  Coronal and sagittal reformations were also performed. Contrast was not administered.    COMPARISON:  CT head from August 2017.    FINDINGS:  No evidence of acute/recent major vascular distribution cerebral infarction, intraparenchymal hemorrhage, or intra-axial space occupying lesion. The ventricular system is normal in size and configuration with no evidence of hydrocephalus. No effacement of the skull-base cisterns. No abnormal extra-axial fluid collections or blood products.    No acute facial fractures are identified.  There is right facial and periorbital soft tissue swelling and edema.  No acute abnormalities are seen involving the orbits.  Visualized paranasal sinuses and mastoid air cells are essentially clear.  The calvarium shows no significant abnormality.                                       Medications - No data to display     Medical Decision Making  MEDICAL DECISION MAKING    Initial Assessment:  67-year-old male who suffered a trip and fall onto a wooden Pallet causing abrasions to the right side of the face and closed-head injury.  He is on anticoagulant therapy.  On examination he had marked ecchymosis to the right inferior orbit.  There were 2 avulsions to the right side of the face near the outer canthus.  The skin had essentially been removed by abrasion.  I applied skin glue not as a laceration  closure but as a protectant and hemostatic agent following cleansing by nursing staff.  Extraocular movements intact pupils equal round reactive to light normal neurologic function was noted on exam and spine is without tenderness or step-offs.  He declined pain medication.  Full range of motion of the neck was witnessed.    Differential Diagnosis:  Subdural hematoma subarachnoid hemorrhage tetanus wound infection orbital fracture    Diagnostic Plan:  CT head, max face    See ED Course Below for Interpretation and Time Stamped Events    Discharge Planning:  Patient will be discharged home to use Tylenol  for discomfort     Amount and/or Complexity of Data Reviewed  Radiology: ordered. Decision-making details documented in ED Course.    Risk  OTC drugs.  Prescription drug management.  Diagnosis or treatment significantly limited by social determinants of health.                 ED Course as of 04/11/23 1617   Mon Apr 10, 2023   1758 BP: 138/75 [VC]   1758 Temp: 98 °F (36.7 °C) [VC]   1758 Temp Source: Oral [VC]   1758 Pulse: 70 [VC]   1758 Resp: 18 [VC]   1758 SpO2: 98 % [VC]   1854 CT Head Without Contrast  No acute intracranial abnormalities identified.     No acute facial fractures identified [VC]      ED Course User Index  [VC] John Jasmine DNP                 Clinical Impression:   Final diagnoses:  [S00.81XA] Facial abrasion, initial encounter (Primary)  [S05.11XA] Orbital contusion, right, initial encounter        ED Disposition Condition    Discharge Stable          ED Prescriptions       Medication Sig Dispense Start Date End Date Auth. Provider    HYDROcodone-acetaminophen (NORCO) 5-325 mg per tablet Take 1 tablet by mouth every 6 (six) hours as needed for Pain. 12 tablet 4/10/2023 -- John Jasmine DNP          Follow-up Information       Follow up With Specialties Details Why Contact Info    Josselin Parson MD Family Medicine Schedule an appointment as soon as possible for a visit   [facility]  [address] [address]  [phone]               John Jasmine, LETY  04/11/23 6595

## 2023-04-10 NOTE — TELEPHONE ENCOUNTER
Pt calling and he said that he hit his cheek after falling around 12:15 and takes Eliquis and its still bleeding pt said that he has a large hematoma on cheek bone and also now has swelling under the eye pt triage and told to go to the ED per care advice and he was told to apply pressure and to go in to the ED he was already there in the parking lot. Pt to call once he is back home and if any question or worsening of condition.              Reason for Disposition   [1] Bleeding AND [2] won't stop after 10 minutes of direct pressure (using correct technique)    Additional Information   Negative: [1] Major bleeding (e.g., actively dripping or spurting) AND [2] can't be stopped   Negative: Bullet wound, knife wound, or other penetrating object   Negative: Difficulty breathing or choking   Negative: Knocked out (unconscious) > 1 minute   Negative: Difficult to awaken or acting confused (e.g., disoriented, slurred speech)   Negative: Severe neck pain   Negative: Sounds like a life-threatening emergency to the triager   Negative: Looks like a broken bone (e.g., cheekbone is flat on one side)   Negative: Can't fully open or close the mouth   Negative: Crooked face or smile    Protocols used: Face Injury-A-AH

## 2023-05-04 DIAGNOSIS — I82.432 ACUTE DEEP VEIN THROMBOSIS (DVT) OF POPLITEAL VEIN OF LEFT LOWER EXTREMITY: ICD-10-CM

## 2023-05-04 RX ORDER — APIXABAN 5 MG/1
TABLET, FILM COATED ORAL
Qty: 180 TABLET | Refills: 3 | Status: SHIPPED | OUTPATIENT
Start: 2023-05-04 | End: 2024-01-17 | Stop reason: SDUPTHER

## 2023-05-09 ENCOUNTER — LAB VISIT (OUTPATIENT)
Dept: LAB | Facility: HOSPITAL | Age: 68
End: 2023-05-09
Attending: INTERNAL MEDICINE
Payer: MEDICARE

## 2023-05-09 DIAGNOSIS — I82.432 ACUTE DEEP VEIN THROMBOSIS (DVT) OF POPLITEAL VEIN OF LEFT LOWER EXTREMITY: ICD-10-CM

## 2023-05-09 LAB — HCYS SERPL-SCNC: 6.8 UMOL/L (ref 4–16.5)

## 2023-05-09 PROCEDURE — 85300 ANTITHROMBIN III ACTIVITY: CPT | Performed by: INTERNAL MEDICINE

## 2023-05-09 PROCEDURE — 85306 CLOT INHIBIT PROT S FREE: CPT | Performed by: INTERNAL MEDICINE

## 2023-05-09 PROCEDURE — 85303 CLOT INHIBIT PROT C ACTIVITY: CPT | Performed by: INTERNAL MEDICINE

## 2023-05-09 PROCEDURE — 83090 ASSAY OF HOMOCYSTEINE: CPT | Performed by: INTERNAL MEDICINE

## 2023-05-09 PROCEDURE — 86147 CARDIOLIPIN ANTIBODY EA IG: CPT | Mod: 59 | Performed by: INTERNAL MEDICINE

## 2023-05-11 LAB
AT III ACT/NOR PPP CHRO: 107 % (ref 83–118)
PROT C ACT/NOR PPP CHRO: 74 % (ref 70–150)

## 2023-05-12 LAB — PROT S FREE AG ACT/NOR PPP IA: 96 % (ref 57–171)

## 2023-05-15 LAB
CARDIOLIPIN IGG SER IA-ACNC: <9.4 GPL (ref 0–14.99)
CARDIOLIPIN IGM SER IA-ACNC: <9.4 MPL (ref 0–12.49)

## 2023-05-17 ENCOUNTER — OFFICE VISIT (OUTPATIENT)
Dept: HEMATOLOGY/ONCOLOGY | Facility: CLINIC | Age: 68
End: 2023-05-17
Payer: MEDICARE

## 2023-05-17 VITALS
DIASTOLIC BLOOD PRESSURE: 71 MMHG | SYSTOLIC BLOOD PRESSURE: 129 MMHG | BODY MASS INDEX: 32.2 KG/M2 | HEART RATE: 71 BPM | WEIGHT: 217.38 LBS | OXYGEN SATURATION: 97 % | RESPIRATION RATE: 18 BRPM | HEIGHT: 69 IN

## 2023-05-17 DIAGNOSIS — Z86.711 HISTORY OF PULMONARY EMBOLISM: ICD-10-CM

## 2023-05-17 DIAGNOSIS — I10 ESSENTIAL HYPERTENSION: ICD-10-CM

## 2023-05-17 DIAGNOSIS — I82.432 ACUTE DEEP VEIN THROMBOSIS (DVT) OF POPLITEAL VEIN OF LEFT LOWER EXTREMITY: Primary | ICD-10-CM

## 2023-05-17 DIAGNOSIS — E78.5 HYPERLIPIDEMIA, ACQUIRED: ICD-10-CM

## 2023-05-17 PROCEDURE — 1100F PR PT FALLS ASSESS DOC 2+ FALLS/FALL W/INJURY/YR: ICD-10-PCS | Mod: CPTII,,, | Performed by: INTERNAL MEDICINE

## 2023-05-17 PROCEDURE — 3008F BODY MASS INDEX DOCD: CPT | Mod: CPTII,,, | Performed by: INTERNAL MEDICINE

## 2023-05-17 PROCEDURE — 3078F DIAST BP <80 MM HG: CPT | Mod: CPTII,,, | Performed by: INTERNAL MEDICINE

## 2023-05-17 PROCEDURE — 1160F RVW MEDS BY RX/DR IN RCRD: CPT | Mod: CPTII,,, | Performed by: INTERNAL MEDICINE

## 2023-05-17 PROCEDURE — 3288F PR FALLS RISK ASSESSMENT DOCUMENTED: ICD-10-PCS | Mod: CPTII,,, | Performed by: INTERNAL MEDICINE

## 2023-05-17 PROCEDURE — 1159F MED LIST DOCD IN RCRD: CPT | Mod: CPTII,,, | Performed by: INTERNAL MEDICINE

## 2023-05-17 PROCEDURE — 99214 PR OFFICE/OUTPT VISIT, EST, LEVL IV, 30-39 MIN: ICD-10-PCS | Mod: ,,, | Performed by: INTERNAL MEDICINE

## 2023-05-17 PROCEDURE — 99213 OFFICE O/P EST LOW 20 MIN: CPT | Mod: PO | Performed by: INTERNAL MEDICINE

## 2023-05-17 PROCEDURE — 1159F PR MEDICATION LIST DOCUMENTED IN MEDICAL RECORD: ICD-10-PCS | Mod: CPTII,,, | Performed by: INTERNAL MEDICINE

## 2023-05-17 PROCEDURE — 1126F AMNT PAIN NOTED NONE PRSNT: CPT | Mod: CPTII,,, | Performed by: INTERNAL MEDICINE

## 2023-05-17 PROCEDURE — 3074F PR MOST RECENT SYSTOLIC BLOOD PRESSURE < 130 MM HG: ICD-10-PCS | Mod: CPTII,,, | Performed by: INTERNAL MEDICINE

## 2023-05-17 PROCEDURE — 3044F HG A1C LEVEL LT 7.0%: CPT | Mod: CPTII,,, | Performed by: INTERNAL MEDICINE

## 2023-05-17 PROCEDURE — 1160F PR REVIEW ALL MEDS BY PRESCRIBER/CLIN PHARMACIST DOCUMENTED: ICD-10-PCS | Mod: CPTII,,, | Performed by: INTERNAL MEDICINE

## 2023-05-17 PROCEDURE — 1100F PTFALLS ASSESS-DOCD GE2>/YR: CPT | Mod: CPTII,,, | Performed by: INTERNAL MEDICINE

## 2023-05-17 PROCEDURE — 3288F FALL RISK ASSESSMENT DOCD: CPT | Mod: CPTII,,, | Performed by: INTERNAL MEDICINE

## 2023-05-17 PROCEDURE — 3044F PR MOST RECENT HEMOGLOBIN A1C LEVEL <7.0%: ICD-10-PCS | Mod: CPTII,,, | Performed by: INTERNAL MEDICINE

## 2023-05-17 PROCEDURE — 3078F PR MOST RECENT DIASTOLIC BLOOD PRESSURE < 80 MM HG: ICD-10-PCS | Mod: CPTII,,, | Performed by: INTERNAL MEDICINE

## 2023-05-17 PROCEDURE — 3008F PR BODY MASS INDEX (BMI) DOCUMENTED: ICD-10-PCS | Mod: CPTII,,, | Performed by: INTERNAL MEDICINE

## 2023-05-17 PROCEDURE — 99214 OFFICE O/P EST MOD 30 MIN: CPT | Mod: ,,, | Performed by: INTERNAL MEDICINE

## 2023-05-17 PROCEDURE — 3074F SYST BP LT 130 MM HG: CPT | Mod: CPTII,,, | Performed by: INTERNAL MEDICINE

## 2023-05-17 PROCEDURE — 1126F PR PAIN SEVERITY QUANTIFIED, NO PAIN PRESENT: ICD-10-PCS | Mod: CPTII,,, | Performed by: INTERNAL MEDICINE

## 2023-05-17 PROCEDURE — 99999 PR PBB SHADOW E&M-EST. PATIENT-LVL III: ICD-10-PCS | Mod: PBBFAC,,, | Performed by: INTERNAL MEDICINE

## 2023-05-17 PROCEDURE — 99999 PR PBB SHADOW E&M-EST. PATIENT-LVL III: CPT | Mod: PBBFAC,,, | Performed by: INTERNAL MEDICINE

## 2023-05-21 NOTE — PROGRESS NOTES
"PATIENT: Ascencion Owens  MRN: [id number]  DATE: 5/21/2023    Subjective:     Chief complaint:  Chief Complaint   Patient presents with    Follow-up       Interval History: Mr. Owens returns for follow up while on Eliquis for recurrent DVT/PE. He had an episode where he had a mechanical fall and injured his eye resulting in a rather pronounced black eye however he denies any loss of consciousness or pre-syncopal symptoms at that time. He has otherwise had good tolerance of the Eliquis without any side-effects or complaints.       Objective:      Vitals:   Vitals:    05/17/23 1010   BP: 129/71   BP Location: Right arm   Patient Position: Sitting   BP Method: Medium (Automatic)   Pulse: 71   Resp: 18   SpO2: 97%   Weight: 98.6 kg (217 lb 6 oz)   Height: 5' 9" (1.753 m)     BMI: Body mass index is 32.1 kg/m².      Physical Exam:   Physical Exam  Vitals and nursing note reviewed.   Constitutional:       General: He is not in acute distress.     Appearance: Normal appearance. He is normal weight. He is not toxic-appearing.   HENT:      Head: Normocephalic and atraumatic.   Eyes:      General: No scleral icterus.     Conjunctiva/sclera: Conjunctivae normal.   Cardiovascular:      Rate and Rhythm: Normal rate and regular rhythm.      Heart sounds: Normal heart sounds.   Pulmonary:      Effort: Pulmonary effort is normal.      Breath sounds: Normal breath sounds. No wheezing, rhonchi or rales.   Abdominal:      General: Abdomen is flat. Bowel sounds are normal.      Palpations: Abdomen is soft.      Tenderness: There is no abdominal tenderness.   Musculoskeletal:         General: No tenderness or deformity.      Cervical back: Neck supple.   Lymphadenopathy:      Cervical: No cervical adenopathy.   Skin:     General: Skin is warm and dry.      Coloration: Skin is not jaundiced.      Findings: No bruising or erythema.   Neurological:      General: No focal deficit present.      Mental Status: He is alert and oriented to " person, place, and time. Mental status is at baseline.   Psychiatric:         Mood and Affect: Mood normal.         Behavior: Behavior normal.         Thought Content: Thought content normal.         Laboratory Data:  WBC   Date Value Ref Range Status   03/13/2023 6.30 3.90 - 12.70 K/uL Final     Hemoglobin   Date Value Ref Range Status   03/13/2023 14.8 14.0 - 18.0 g/dL Final     Hematocrit   Date Value Ref Range Status   03/13/2023 45.4 40.0 - 54.0 % Final     Platelets   Date Value Ref Range Status   03/13/2023 205 150 - 450 K/uL Final     Gran # (ANC)   Date Value Ref Range Status   03/13/2023 3.8 1.8 - 7.7 K/uL Final     Gran %   Date Value Ref Range Status   03/13/2023 60.0 38.0 - 73.0 % Final       Chemistry        Component Value Date/Time     03/13/2023 1037    K 4.2 03/13/2023 1037     03/13/2023 1037    CO2 28 03/13/2023 1037    BUN 13 03/13/2023 1037    CREATININE 1.1 03/13/2023 1037    GLU 91 03/13/2023 1037        Component Value Date/Time    CALCIUM 9.5 03/13/2023 1037    ALKPHOS 64 03/13/2023 1037    AST 18 03/13/2023 1037    ALT 17 03/13/2023 1037    BILITOT 0.6 03/13/2023 1037    ESTGFRAFRICA >60.0 06/07/2022 0912    EGFRNONAA >60.0 06/07/2022 0912              Assessment/Plan:     1. Acute deep vein thrombosis (DVT) of popliteal vein of left lower extremity    2. History of pulmonary embolism    3. Essential hypertension    4. Hyperlipidemia, acquired      Recurrent DVT/PE--plan for indefinite anticoagulation.  RTC 6 months or sooner PRN    Med and Orders:       Follow Up:  Follow up in about 6 months (around 11/17/2023).    Patient instructions:   There are no Patient Instructions on file for this visit.    Above care plan was discussed with patient and all questions were addressed to their expressed satisfaction.       Shahbaz Sexton MD, [facility]  Hematology & Medical Oncology  [facility]     Total time of this visit, including time spent face to face with patient and/or via  video/audio, and also in preparing for today's visit for MDM and documentation. (Medical Decision Making, including consideration of possible diagnoses, management options, complex medical record review, review of diagnostic tests and information, consideration and discussion of significant complications based on comorbidities, and discussion with providers involved with the care of the patient) 31 minutes. Greater than 50% was spent face to face with the patient counseling and coordinating care.

## 2023-06-11 DIAGNOSIS — I10 ESSENTIAL HYPERTENSION: ICD-10-CM

## 2023-06-12 RX ORDER — AMLODIPINE BESYLATE 10 MG/1
TABLET ORAL
Qty: 90 TABLET | Refills: 0 | Status: SHIPPED | OUTPATIENT
Start: 2023-06-12 | End: 2023-09-11

## 2023-08-03 ENCOUNTER — PATIENT MESSAGE (OUTPATIENT)
Dept: HEMATOLOGY/ONCOLOGY | Facility: CLINIC | Age: 68
End: 2023-08-03
Payer: MEDICARE

## 2023-08-08 ENCOUNTER — OFFICE VISIT (OUTPATIENT)
Dept: FAMILY MEDICINE | Facility: CLINIC | Age: 68
End: 2023-08-08
Payer: MEDICARE

## 2023-08-08 VITALS
TEMPERATURE: 99 F | BODY MASS INDEX: 31.51 KG/M2 | SYSTOLIC BLOOD PRESSURE: 130 MMHG | WEIGHT: 212.75 LBS | OXYGEN SATURATION: 96 % | HEIGHT: 69 IN | RESPIRATION RATE: 18 BRPM | DIASTOLIC BLOOD PRESSURE: 64 MMHG | HEART RATE: 77 BPM

## 2023-08-08 DIAGNOSIS — F51.04 PSYCHOPHYSIOLOGICAL INSOMNIA: Primary | ICD-10-CM

## 2023-08-08 DIAGNOSIS — Z01.818 PREOP EXAM FOR INTERNAL MEDICINE: ICD-10-CM

## 2023-08-08 PROCEDURE — 1100F PTFALLS ASSESS-DOCD GE2>/YR: CPT | Mod: HCNC,CPTII,S$GLB, | Performed by: STUDENT IN AN ORGANIZED HEALTH CARE EDUCATION/TRAINING PROGRAM

## 2023-08-08 PROCEDURE — 3044F HG A1C LEVEL LT 7.0%: CPT | Mod: HCNC,CPTII,S$GLB, | Performed by: STUDENT IN AN ORGANIZED HEALTH CARE EDUCATION/TRAINING PROGRAM

## 2023-08-08 PROCEDURE — 3288F FALL RISK ASSESSMENT DOCD: CPT | Mod: HCNC,CPTII,S$GLB, | Performed by: STUDENT IN AN ORGANIZED HEALTH CARE EDUCATION/TRAINING PROGRAM

## 2023-08-08 PROCEDURE — 3078F PR MOST RECENT DIASTOLIC BLOOD PRESSURE < 80 MM HG: ICD-10-PCS | Mod: HCNC,CPTII,S$GLB, | Performed by: STUDENT IN AN ORGANIZED HEALTH CARE EDUCATION/TRAINING PROGRAM

## 2023-08-08 PROCEDURE — 3078F DIAST BP <80 MM HG: CPT | Mod: HCNC,CPTII,S$GLB, | Performed by: STUDENT IN AN ORGANIZED HEALTH CARE EDUCATION/TRAINING PROGRAM

## 2023-08-08 PROCEDURE — 99999 PR PBB SHADOW E&M-EST. PATIENT-LVL III: ICD-10-PCS | Mod: PBBFAC,HCNC,, | Performed by: STUDENT IN AN ORGANIZED HEALTH CARE EDUCATION/TRAINING PROGRAM

## 2023-08-08 PROCEDURE — 1126F AMNT PAIN NOTED NONE PRSNT: CPT | Mod: HCNC,CPTII,S$GLB, | Performed by: STUDENT IN AN ORGANIZED HEALTH CARE EDUCATION/TRAINING PROGRAM

## 2023-08-08 PROCEDURE — 1126F PR PAIN SEVERITY QUANTIFIED, NO PAIN PRESENT: ICD-10-PCS | Mod: HCNC,CPTII,S$GLB, | Performed by: STUDENT IN AN ORGANIZED HEALTH CARE EDUCATION/TRAINING PROGRAM

## 2023-08-08 PROCEDURE — 99999 PR PBB SHADOW E&M-EST. PATIENT-LVL III: CPT | Mod: PBBFAC,HCNC,, | Performed by: STUDENT IN AN ORGANIZED HEALTH CARE EDUCATION/TRAINING PROGRAM

## 2023-08-08 PROCEDURE — 3288F PR FALLS RISK ASSESSMENT DOCUMENTED: ICD-10-PCS | Mod: HCNC,CPTII,S$GLB, | Performed by: STUDENT IN AN ORGANIZED HEALTH CARE EDUCATION/TRAINING PROGRAM

## 2023-08-08 PROCEDURE — 1100F PR PT FALLS ASSESS DOC 2+ FALLS/FALL W/INJURY/YR: ICD-10-PCS | Mod: HCNC,CPTII,S$GLB, | Performed by: STUDENT IN AN ORGANIZED HEALTH CARE EDUCATION/TRAINING PROGRAM

## 2023-08-08 PROCEDURE — 1159F MED LIST DOCD IN RCRD: CPT | Mod: HCNC,CPTII,S$GLB, | Performed by: STUDENT IN AN ORGANIZED HEALTH CARE EDUCATION/TRAINING PROGRAM

## 2023-08-08 PROCEDURE — 99214 OFFICE O/P EST MOD 30 MIN: CPT | Mod: HCNC,S$GLB,, | Performed by: STUDENT IN AN ORGANIZED HEALTH CARE EDUCATION/TRAINING PROGRAM

## 2023-08-08 PROCEDURE — 3075F PR MOST RECENT SYSTOLIC BLOOD PRESS GE 130-139MM HG: ICD-10-PCS | Mod: HCNC,CPTII,S$GLB, | Performed by: STUDENT IN AN ORGANIZED HEALTH CARE EDUCATION/TRAINING PROGRAM

## 2023-08-08 PROCEDURE — 3008F BODY MASS INDEX DOCD: CPT | Mod: HCNC,CPTII,S$GLB, | Performed by: STUDENT IN AN ORGANIZED HEALTH CARE EDUCATION/TRAINING PROGRAM

## 2023-08-08 PROCEDURE — 3008F PR BODY MASS INDEX (BMI) DOCUMENTED: ICD-10-PCS | Mod: HCNC,CPTII,S$GLB, | Performed by: STUDENT IN AN ORGANIZED HEALTH CARE EDUCATION/TRAINING PROGRAM

## 2023-08-08 PROCEDURE — 99214 PR OFFICE/OUTPT VISIT, EST, LEVL IV, 30-39 MIN: ICD-10-PCS | Mod: HCNC,S$GLB,, | Performed by: STUDENT IN AN ORGANIZED HEALTH CARE EDUCATION/TRAINING PROGRAM

## 2023-08-08 PROCEDURE — 1159F PR MEDICATION LIST DOCUMENTED IN MEDICAL RECORD: ICD-10-PCS | Mod: HCNC,CPTII,S$GLB, | Performed by: STUDENT IN AN ORGANIZED HEALTH CARE EDUCATION/TRAINING PROGRAM

## 2023-08-08 PROCEDURE — 3075F SYST BP GE 130 - 139MM HG: CPT | Mod: HCNC,CPTII,S$GLB, | Performed by: STUDENT IN AN ORGANIZED HEALTH CARE EDUCATION/TRAINING PROGRAM

## 2023-08-08 PROCEDURE — 3044F PR MOST RECENT HEMOGLOBIN A1C LEVEL <7.0%: ICD-10-PCS | Mod: HCNC,CPTII,S$GLB, | Performed by: STUDENT IN AN ORGANIZED HEALTH CARE EDUCATION/TRAINING PROGRAM

## 2023-08-08 RX ORDER — TRAZODONE HYDROCHLORIDE 50 MG/1
50 TABLET ORAL NIGHTLY
Qty: 30 TABLET | Refills: 11 | Status: SHIPPED | OUTPATIENT
Start: 2023-08-08 | End: 2023-09-14

## 2023-08-08 NOTE — PATIENT INSTRUCTIONS
"Sleep Hygiene    Unplug ideally at 8pm from phone, email, social media, or one hour before sleeping.  Use bedroom only for sleeping, relaxing, meditation, and intimacy. Avoid television use in the bedroom.  Light: get 15 minutes of direct sunlight before lunch. Research shows people with windows in their office sleep 46 minutes longer than people with no windows.  Blue light: avoid too much blue light specially at night (computer, tablets, phones, TV). Some devices may allow you to switch to "night mode" which can reduced exposure to blue light in the evening.    Optimal Timing  Setting a sleep schedule and sticking to it if possible.    - Begin relaxation time period at the same time every night where you begin to "wind down" from the day. Begin this roughly 2 hours before bedtime.   - Lie down in bed at roughly the same time each night.   - Wake up at the same time each morning, even on days off.    - Do not eat within 2 hours of your set bedtime.    - Ok to drink fluids, but with caution as fluids ingested later in the evening can result in needing to urinate during the night. Try to hydrate as much as possible in the morning or afternoon to decrease the number of times you urinate during the night.    - Do not look at the time if you wake up during the night, this can cause additional sleep anxiety.    Bedtime ritual (creating one that suits you)  Relaxing music  Journaling (journal dumping, success journal, gratefulness journal)  Bath time (lavender oil, Epsom salt)  Stretching or yoga (legs up the wall, child pose)  Squeeze and release muscle contractions (or relax your body muscle by muscle: face, jaw, eyes, shoulders all the way to the feet)  4 (in) - 7 (hold) - 8 (out) breathing technique  Meditation (Guided or self-led)  Hypnosis CDs ([email or web address].[employer], Blanca Short)  Deep Sleep, [place], iTunes  Pay attention to dreams (befriending sub-consciousness)  Honor your sleep, its a sacred time for " the sub-consciousness  Preparation for tomorrows healthy meals    Other Strategies  Remember old memories (childhood, as far back as possible) instead of thinking about today or tomorrow (shifts the attention from front of the brain to the back of the brain)  Count backyards from [place] (counting sheep)  Aromatherapy (lavender is very calming)  Allow creativity instead of trying to sleep (if thats happening occasionally)  Ask your partner for a foot massage  Dont fight when you cant sleep - rather think to yourself Im resting now, that will help me tomorrow  a. Higher pillow (adjustable bed), sleep mask, ear plugs, sound machine (white noise), delta waves, sleep phone, ice pack under neck, cool room  Blackout curtains are really helpful  If you get less than 8 hours of sleep, try to catch up on weekends  No caffeine after lunch (try if it makes a difference)  A study shows that sleeping pills and placebos have the same effects (mental)  Light snack before going to bed (dairy) works for some people  Book: The Sleep Revolution by Shari Granados  CBT-i  (nicky that gives suggestions for better sleep)  Completing the day: to do lists, finding solutions, journaling (for 20 min. 2 hours ahead of sleep time)  Worry sheet (good worries what is in my control, bad worries what is not in my control, I dont know)  Instead of thinking about stressful things, imagine you are at a beach playing (or lying in a canoe on a calm lake with the blue lidia above you)  Grounding - direct contact with the earth (reduces night-time levels of cortisol)  Eating a healthy breakfast within the hour of waking up, and light and early meals at night    Natural Supplements  Melatonin (take for at least 3 weeks to see results),  Magnesium, Vitamin B complex, Valerian Root     General

## 2023-08-08 NOTE — PROGRESS NOTES
SUBJECTIVE     Chief Complaint   Patient presents with    Pre-op Exam       HPI  Ascencion Owens is a 67 y.o. male presents to clinic for pre-operative evaluation for cataract surgery.     Patient denies any chest pain, SOB, palpitations. Patient reports normal exercise tolerance, able to climb stairs with no issues. Patient currently still taking Eliquis for DVT episode this last January, reports no recent issues with bleeding/bruising. Patient reports occasional snoring reported by wife, and does not usually lay flat on the bed sleeping (for years).     Patient also reports being under stress due to work. Patient is having difficulty with sleep by having early morning awakenings and difficulty falling back asleep. Patient has tried benadryl, unisom and melatonin for sleep, reporting that melatonin doesn't really help and that there's a tolerance to benadryl/unisom when taken for consecutive nights.     PAST MEDICAL HISTORY:  Past Medical History:   Diagnosis Date    Acute bronchitis     Arthritis     CKD (chronic kidney disease)     Cluster headache     Dyslipidemia     Essential hypertension, benign     Hypercholesteremia     Obesity        ALLERGIES AND MEDICATIONS: updated and reviewed.  Review of patient's allergies indicates:   Allergen Reactions    Perfume Rash     Allergic to certain cologne fragrance - rash certain area of the body      Current Outpatient Medications   Medication Sig Dispense Refill    amLODIPine (NORVASC) 10 MG tablet TAKE 1 TABLET EVERY DAY 90 tablet 0    apixaban (ELIQUIS) 5 mg Tab TAKE 1 TABLET TWICE DAILY 180 tablet 3    atorvastatin (LIPITOR) 20 MG tablet TAKE 1 TABLET EVERY DAY 90 tablet 3    HYDROcodone-acetaminophen (NORCO) 5-325 mg per tablet Take 1 tablet by mouth every 6 (six) hours as needed for Pain. 12 tablet 0    tiZANidine (ZANAFLEX) 4 MG tablet TAKE 1 TO 2 TABLETS BY MOUTH NIGHTLY AS NEEDED FOR PAIN 180 tablet 2    traZODone (DESYREL) 50 MG tablet Take 1 tablet (50  "mg total) by mouth every evening. 30 tablet 11     No current facility-administered medications for this visit.       ROS  Review of Systems   Constitutional:  Negative for chills and fever.   HENT:  Negative for congestion, nosebleeds and sore throat.    Eyes:  Negative for visual disturbance.   Respiratory:  Negative for cough and shortness of breath.    Cardiovascular:  Negative for chest pain and palpitations.   Gastrointestinal:  Negative for abdominal pain.   Genitourinary:  Positive for frequency (chronic problem).   Musculoskeletal:  Negative for arthralgias.   Skin:  Negative for rash.   Neurological:  Negative for dizziness, syncope and headaches.   Hematological:  Does not bruise/bleed easily.   Psychiatric/Behavioral:  The patient is nervous/anxious (stressed with work).          OBJECTIVE     Physical Exam  Vitals:    08/08/23 0943   BP: 130/64   Pulse: 77   Resp: 18   Temp: 98.7 °F (37.1 °C)    Body mass index is 31.42 kg/m².  Weight: 96.5 kg (212 lb 11.9 oz)   Height: 5' 9" (175.3 cm)     Physical Exam  HENT:      Head: Normocephalic and atraumatic.   Eyes:      Extraocular Movements: Extraocular movements intact.      Pupils: Pupils are equal, round, and reactive to light.   Cardiovascular:      Rate and Rhythm: Normal rate and regular rhythm.      Heart sounds: Normal heart sounds.   Pulmonary:      Effort: Pulmonary effort is normal.      Breath sounds: Normal breath sounds.   Abdominal:      General: There is no distension.   Musculoskeletal:         General: Normal range of motion.      Cervical back: Normal range of motion.      Right lower leg: Edema (chronic, on amlodipine) present.      Left lower leg: Edema (chronic, on amlodipine) present.   Skin:     General: Skin is warm and dry.   Neurological:      General: No focal deficit present.      Mental Status: He is alert.   Psychiatric:         Mood and Affect: Mood normal.         Behavior: Behavior normal.         Health Maintenance         " Date Due Completion Date    Abdominal Aortic Aneurysm Screening Never done ---    Influenza Vaccine (1) 09/01/2023 9/27/2022    Hemoglobin A1c (Prediabetes) 03/13/2024 3/13/2023    PROSTATE-SPECIFIC ANTIGEN 05/09/2024 5/9/2023    Colorectal Cancer Screening 06/10/2025 6/10/2015    Lipid Panel 03/13/2028 3/13/2023    TETANUS VACCINE 10/02/2029 10/2/2019              ASSESSMENT     67 y.o. male with     1. Psychophysiological insomnia    2. Preop exam for internal medicine        PLAN:     1. Psychophysiological insomnia  - Uncontrolled. Given pt handout on sleep hygeine and relaxation techniques. Will start trazodone. F/u 6 weeks, sooner if needed.  - traZODone (DESYREL) 50 MG tablet; Take 1 tablet (50 mg total) by mouth every evening.  Dispense: 30 tablet; Refill: 11    2. Preop exam for internal medicine  - Pt is low risk for very low risk procedure. Paperwork filled out. F/u 6 weeks.        Josselin Parson MD  08/08/2023 9:57 AM        Follow up in about 6 weeks (around 9/19/2023).

## 2023-08-21 ENCOUNTER — PATIENT MESSAGE (OUTPATIENT)
Dept: HEMATOLOGY/ONCOLOGY | Facility: CLINIC | Age: 68
End: 2023-08-21
Payer: MEDICARE

## 2023-09-09 DIAGNOSIS — I10 ESSENTIAL HYPERTENSION: ICD-10-CM

## 2023-09-11 RX ORDER — AMLODIPINE BESYLATE 10 MG/1
TABLET ORAL
Qty: 90 TABLET | Refills: 0 | Status: SHIPPED | OUTPATIENT
Start: 2023-09-11 | End: 2024-02-01 | Stop reason: SDUPTHER

## 2023-09-11 NOTE — TELEPHONE ENCOUNTER
Last Office Visit Info:   The patient's last visit with Josselin Parson MD was on 8/8/2023.    The patient's last visit in current department was on 8/8/2023.        Last CBC Results:   Lab Results   Component Value Date    WBC 6.30 03/13/2023    HGB 14.8 03/13/2023    HCT 45.4 03/13/2023     03/13/2023       Last CMP Results  Lab Results   Component Value Date     03/13/2023    K 4.2 03/13/2023     03/13/2023    CO2 28 03/13/2023    BUN 13 03/13/2023    CREATININE 1.1 03/13/2023    CALCIUM 9.5 03/13/2023    ALBUMIN 3.7 03/13/2023    AST 18 03/13/2023    ALT 17 03/13/2023       Last Lipids  Lab Results   Component Value Date    CHOL 155 03/13/2023    TRIG 45 03/13/2023    HDL 52 03/13/2023    LDLCALC 94.0 03/13/2023       Last A1C  Lab Results   Component Value Date    HGBA1C 5.6 03/13/2023       Last TSH  Lab Results   Component Value Date    TSH 1.341 03/13/2023             Current Med Refills  Medication List with Changes/Refills   Current Medications    AMLODIPINE (NORVASC) 10 MG TABLET    TAKE 1 TABLET EVERY DAY       Start Date: 6/12/2023 End Date: --    APIXABAN (ELIQUIS) 5 MG TAB    TAKE 1 TABLET TWICE DAILY       Start Date: 5/4/2023  End Date: --    ATORVASTATIN (LIPITOR) 20 MG TABLET    TAKE 1 TABLET EVERY DAY       Start Date: 7/16/2022 End Date: --    HYDROCODONE-ACETAMINOPHEN (NORCO) 5-325 MG PER TABLET    Take 1 tablet by mouth every 6 (six) hours as needed for Pain.       Start Date: 4/10/2023 End Date: --    TIZANIDINE (ZANAFLEX) 4 MG TABLET    TAKE 1 TO 2 TABLETS BY MOUTH NIGHTLY AS NEEDED FOR PAIN       Start Date: 2/28/2022 End Date: --    TRAZODONE (DESYREL) 50 MG TABLET    Take 1 tablet (50 mg total) by mouth every evening.       Start Date: 8/8/2023  End Date: 8/7/2024       Order(s) placed per written order guidelines: none    Please advise.

## 2023-09-14 ENCOUNTER — OFFICE VISIT (OUTPATIENT)
Dept: FAMILY MEDICINE | Facility: CLINIC | Age: 68
End: 2023-09-14
Payer: MEDICARE

## 2023-09-14 VITALS
WEIGHT: 214.31 LBS | HEART RATE: 63 BPM | OXYGEN SATURATION: 97 % | SYSTOLIC BLOOD PRESSURE: 136 MMHG | TEMPERATURE: 98 F | DIASTOLIC BLOOD PRESSURE: 82 MMHG | BODY MASS INDEX: 31.64 KG/M2

## 2023-09-14 DIAGNOSIS — I10 ESSENTIAL HYPERTENSION: Primary | ICD-10-CM

## 2023-09-14 DIAGNOSIS — Z23 NEED FOR INFLUENZA VACCINATION: ICD-10-CM

## 2023-09-14 DIAGNOSIS — F51.04 PSYCHOPHYSIOLOGICAL INSOMNIA: ICD-10-CM

## 2023-09-14 PROCEDURE — 3079F PR MOST RECENT DIASTOLIC BLOOD PRESSURE 80-89 MM HG: ICD-10-PCS | Mod: HCNC,CPTII,S$GLB, | Performed by: STUDENT IN AN ORGANIZED HEALTH CARE EDUCATION/TRAINING PROGRAM

## 2023-09-14 PROCEDURE — 3044F HG A1C LEVEL LT 7.0%: CPT | Mod: HCNC,CPTII,S$GLB, | Performed by: STUDENT IN AN ORGANIZED HEALTH CARE EDUCATION/TRAINING PROGRAM

## 2023-09-14 PROCEDURE — 99214 PR OFFICE/OUTPT VISIT, EST, LEVL IV, 30-39 MIN: ICD-10-PCS | Mod: HCNC,S$GLB,, | Performed by: STUDENT IN AN ORGANIZED HEALTH CARE EDUCATION/TRAINING PROGRAM

## 2023-09-14 PROCEDURE — 3008F PR BODY MASS INDEX (BMI) DOCUMENTED: ICD-10-PCS | Mod: HCNC,CPTII,S$GLB, | Performed by: STUDENT IN AN ORGANIZED HEALTH CARE EDUCATION/TRAINING PROGRAM

## 2023-09-14 PROCEDURE — 3075F PR MOST RECENT SYSTOLIC BLOOD PRESS GE 130-139MM HG: ICD-10-PCS | Mod: HCNC,CPTII,S$GLB, | Performed by: STUDENT IN AN ORGANIZED HEALTH CARE EDUCATION/TRAINING PROGRAM

## 2023-09-14 PROCEDURE — 1159F PR MEDICATION LIST DOCUMENTED IN MEDICAL RECORD: ICD-10-PCS | Mod: HCNC,CPTII,S$GLB, | Performed by: STUDENT IN AN ORGANIZED HEALTH CARE EDUCATION/TRAINING PROGRAM

## 2023-09-14 PROCEDURE — 99999 PR PBB SHADOW E&M-EST. PATIENT-LVL III: ICD-10-PCS | Mod: PBBFAC,HCNC,, | Performed by: STUDENT IN AN ORGANIZED HEALTH CARE EDUCATION/TRAINING PROGRAM

## 2023-09-14 PROCEDURE — G0008 ADMIN INFLUENZA VIRUS VAC: HCPCS | Mod: HCNC,S$GLB,, | Performed by: STUDENT IN AN ORGANIZED HEALTH CARE EDUCATION/TRAINING PROGRAM

## 2023-09-14 PROCEDURE — 3008F BODY MASS INDEX DOCD: CPT | Mod: HCNC,CPTII,S$GLB, | Performed by: STUDENT IN AN ORGANIZED HEALTH CARE EDUCATION/TRAINING PROGRAM

## 2023-09-14 PROCEDURE — 1159F MED LIST DOCD IN RCRD: CPT | Mod: HCNC,CPTII,S$GLB, | Performed by: STUDENT IN AN ORGANIZED HEALTH CARE EDUCATION/TRAINING PROGRAM

## 2023-09-14 PROCEDURE — 99214 OFFICE O/P EST MOD 30 MIN: CPT | Mod: HCNC,S$GLB,, | Performed by: STUDENT IN AN ORGANIZED HEALTH CARE EDUCATION/TRAINING PROGRAM

## 2023-09-14 PROCEDURE — 3075F SYST BP GE 130 - 139MM HG: CPT | Mod: HCNC,CPTII,S$GLB, | Performed by: STUDENT IN AN ORGANIZED HEALTH CARE EDUCATION/TRAINING PROGRAM

## 2023-09-14 PROCEDURE — 99999 PR PBB SHADOW E&M-EST. PATIENT-LVL III: CPT | Mod: PBBFAC,HCNC,, | Performed by: STUDENT IN AN ORGANIZED HEALTH CARE EDUCATION/TRAINING PROGRAM

## 2023-09-14 PROCEDURE — G0008 FLU VACCINE - QUADRIVALENT - ADJUVANTED: ICD-10-PCS | Mod: HCNC,S$GLB,, | Performed by: STUDENT IN AN ORGANIZED HEALTH CARE EDUCATION/TRAINING PROGRAM

## 2023-09-14 PROCEDURE — 3044F PR MOST RECENT HEMOGLOBIN A1C LEVEL <7.0%: ICD-10-PCS | Mod: HCNC,CPTII,S$GLB, | Performed by: STUDENT IN AN ORGANIZED HEALTH CARE EDUCATION/TRAINING PROGRAM

## 2023-09-14 PROCEDURE — 90694 FLU VACCINE - QUADRIVALENT - ADJUVANTED: ICD-10-PCS | Mod: HCNC,S$GLB,, | Performed by: STUDENT IN AN ORGANIZED HEALTH CARE EDUCATION/TRAINING PROGRAM

## 2023-09-14 PROCEDURE — 3079F DIAST BP 80-89 MM HG: CPT | Mod: HCNC,CPTII,S$GLB, | Performed by: STUDENT IN AN ORGANIZED HEALTH CARE EDUCATION/TRAINING PROGRAM

## 2023-09-14 PROCEDURE — 90694 VACC AIIV4 NO PRSRV 0.5ML IM: CPT | Mod: HCNC,S$GLB,, | Performed by: STUDENT IN AN ORGANIZED HEALTH CARE EDUCATION/TRAINING PROGRAM

## 2023-09-14 RX ORDER — BIMATOPROST 0.1 MG/ML
1 SOLUTION/ DROPS OPHTHALMIC NIGHTLY
COMMUNITY
Start: 2023-08-24 | End: 2023-09-14

## 2023-09-14 RX ORDER — TIMOLOL MALEATE 5 MG/ML
1 SOLUTION/ DROPS OPHTHALMIC 2 TIMES DAILY
COMMUNITY
Start: 2023-08-31 | End: 2023-11-21

## 2023-09-14 RX ORDER — KETOROLAC TROMETHAMINE 5 MG/ML
SOLUTION OPHTHALMIC
COMMUNITY
Start: 2023-08-20

## 2023-09-14 RX ORDER — PREDNISOLONE ACETATE 10 MG/ML
SUSPENSION/ DROPS OPHTHALMIC
COMMUNITY
Start: 2023-08-20 | End: 2023-11-21

## 2023-09-14 RX ORDER — OFLOXACIN 3 MG/ML
SOLUTION/ DROPS OPHTHALMIC
COMMUNITY
Start: 2023-09-11 | End: 2023-09-14

## 2023-09-14 NOTE — PROGRESS NOTES
Health Maintenance Due   Topic     Abdominal Aortic Aneurysm Screening  Consult pcp    Influenza Vaccine (1)

## 2023-09-14 NOTE — PROGRESS NOTES
SUBJECTIVE     Chief Complaint   Patient presents with    Follow-up       HPI  Ascencion Owens is a 67 y.o. male with multiple medical diagnoses as listed in the medical history and problem list that presents for follow-up of insomnia.    Pt started on trazodone for insomnia at last visit and given sleep hygiene info. Today, reports he did not take medications but he did sleep hygiene information and feels better. Would like to try NAD.    PAST MEDICAL HISTORY:  Past Medical History:   Diagnosis Date    Acute bronchitis     Arthritis     CKD (chronic kidney disease)     Cluster headache     Dyslipidemia     Essential hypertension, benign     Hypercholesteremia     Obesity        ALLERGIES AND MEDICATIONS: updated and reviewed.  Review of patient's allergies indicates:   Allergen Reactions    Perfume Rash     Allergic to certain cologne fragrance - rash certain area of the body      Current Outpatient Medications   Medication Sig Dispense Refill    amLODIPine (NORVASC) 10 MG tablet TAKE 1 TABLET EVERY DAY 90 tablet 0    apixaban (ELIQUIS) 5 mg Tab TAKE 1 TABLET TWICE DAILY 180 tablet 3    atorvastatin (LIPITOR) 20 MG tablet TAKE 1 TABLET EVERY DAY 90 tablet 3    ketorolac 0.5% (ACULAR) 0.5 % Drop Place into the left eye.      prednisoLONE acetate (PRED FORTE) 1 % DrpS Place into the left eye.      timolol maleate 0.5% (TIMOPTIC) 0.5 % Drop Place 1 drop into the left eye 2 (two) times daily.      tiZANidine (ZANAFLEX) 4 MG tablet TAKE 1 TO 2 TABLETS BY MOUTH NIGHTLY AS NEEDED FOR PAIN (Patient not taking: Reported on 9/14/2023) 180 tablet 2     No current facility-administered medications for this visit.       ROS  Review of Systems   Constitutional:  Negative for chills, fatigue and fever.   HENT:  Negative for rhinorrhea and sore throat.    Respiratory:  Negative for cough and shortness of breath.    Cardiovascular:  Negative for chest pain and palpitations.   Gastrointestinal:  Negative for constipation,  diarrhea, nausea and vomiting.   Genitourinary:  Negative for dysuria.   Musculoskeletal:  Negative for joint swelling.   Skin:  Negative for rash and wound.   Neurological:  Negative for light-headedness and headaches.   Psychiatric/Behavioral:  Positive for sleep disturbance. Negative for dysphoric mood. The patient is not nervous/anxious.          OBJECTIVE     Physical Exam  Vitals:    09/14/23 1029   BP: 136/82   Pulse: 63   Temp: 98.3 °F (36.8 °C)    Body mass index is 31.64 kg/m².  Weight: 97.2 kg (214 lb 4.6 oz)         Physical Exam  Vitals reviewed.   Constitutional:       General: He is not in acute distress.  HENT:      Right Ear: External ear normal.      Left Ear: External ear normal.      Nose: Nose normal.      Mouth/Throat:      Mouth: Mucous membranes are moist.   Eyes:      Extraocular Movements: Extraocular movements intact.      Conjunctiva/sclera: Conjunctivae normal.      Pupils: Pupils are equal, round, and reactive to light.   Pulmonary:      Effort: Pulmonary effort is normal.   Abdominal:      General: There is no distension.      Palpations: Abdomen is soft.   Musculoskeletal:         General: No swelling. Normal range of motion.      Cervical back: Normal range of motion.   Skin:     General: Skin is warm and dry.      Findings: No rash.   Neurological:      General: No focal deficit present.      Mental Status: He is alert and oriented to person, place, and time.   Psychiatric:         Mood and Affect: Mood normal.         Behavior: Behavior normal.           Health Maintenance         Date Due Completion Date    Abdominal Aortic Aneurysm Screening Never done ---    Hemoglobin A1c (Prediabetes) 03/13/2024 3/13/2023    PROSTATE-SPECIFIC ANTIGEN 05/09/2024 5/9/2023    Colorectal Cancer Screening 06/10/2025 6/10/2015    Lipid Panel 03/13/2028 3/13/2023    TETANUS VACCINE 10/02/2029 10/2/2019              ASSESSMENT     67 y.o. male with     1. Essential hypertension    2.  Psychophysiological insomnia    3. Need for influenza vaccination        PLAN:     1. Essential hypertension  - Stable. Patient was counseled and encouraged to maintain a low sodium diet, as well as increasing physical activity.  Recommend random BP checks at home on a regular basis. Will continue medication at this time, and follow up in 3-6 months, or sooner if blood pressure begins to increase.       2. Psychophysiological insomnia  - Improved but not yet resolved. Ok to start NAD and continue sleep hygeine. F/u 6 months, sooner if needed.    3. Need for influenza vaccination  - Due, ordered.  - Influenza (FLUAD) - Quadrivalent (Adjuvanted) *Preferred* (65+) (PF)        Josselin Parson MD  09/14/2023 10:41 AM        Follow up in about 6 months (around 3/14/2024).

## 2023-10-24 ENCOUNTER — TELEPHONE (OUTPATIENT)
Dept: HEMATOLOGY/ONCOLOGY | Facility: CLINIC | Age: 68
End: 2023-10-24
Payer: MEDICARE

## 2023-10-24 NOTE — TELEPHONE ENCOUNTER
Spoke with patient he was asking about Eliquis. He said that the copay for the quarters of the year with the insurance gets to be a bit to expensive and wanted to see if there's anything we can do to get him help so it's now as high. I told him that I will speak with Dr. Sexton and with his insurance and see what the best option will be. I told him he may end up having to change him to a different medication altogether. I told patient I will get back with him either today or tomorrow. Patient verbalized understanding and thanked me for looking into this for him.       ----- Message from Flora Severino sent at 10/23/2023  4:21 PM CDT -----  Needs advice from nurse:      Who Called:pt  Regarding:would like a call back regarding a prescription  Would the patient rather a call back or VIA [employer]?  Best Call Back number:[phone]  Additional Info:

## 2023-10-31 ENCOUNTER — TELEPHONE (OUTPATIENT)
Dept: HEMATOLOGY/ONCOLOGY | Facility: CLINIC | Age: 68
End: 2023-10-31
Payer: MEDICARE

## 2023-10-31 NOTE — TELEPHONE ENCOUNTER
Spoke with patient and let him know for the [email or web address] assistance program I have everything printed and filled out for our portion he will just have to fill out and sign his parts and also will need his proof of income. He will take it home with him when he comes in the see Dr. Sexton again in a couple of weeks.       ----- Message from Micheal Musa sent at 10/31/2023  1:44 PM CDT -----  Contact: pt  Type:  Patient Returning Call    Who Called:pt  Who Left Message for Patient: Tracie   Does the patient know what this is regarding?:yes  Would the patient rather a call back or a response via [facility]? call  Best Call Back Number:[phone]  Additional Information:

## 2023-11-15 ENCOUNTER — OFFICE VISIT (OUTPATIENT)
Dept: HEMATOLOGY/ONCOLOGY | Facility: CLINIC | Age: 68
End: 2023-11-15
Payer: MEDICARE

## 2023-11-15 ENCOUNTER — TELEPHONE (OUTPATIENT)
Dept: HEMATOLOGY/ONCOLOGY | Facility: CLINIC | Age: 68
End: 2023-11-15
Payer: MEDICARE

## 2023-11-15 VITALS
BODY MASS INDEX: 32.88 KG/M2 | HEIGHT: 69 IN | WEIGHT: 222 LBS | OXYGEN SATURATION: 98 % | SYSTOLIC BLOOD PRESSURE: 137 MMHG | HEART RATE: 65 BPM | RESPIRATION RATE: 16 BRPM | DIASTOLIC BLOOD PRESSURE: 68 MMHG

## 2023-11-15 DIAGNOSIS — I10 ESSENTIAL HYPERTENSION: ICD-10-CM

## 2023-11-15 DIAGNOSIS — Z86.711 HISTORY OF PULMONARY EMBOLISM: Primary | ICD-10-CM

## 2023-11-15 PROCEDURE — 99999 PR PBB SHADOW E&M-EST. PATIENT-LVL III: CPT | Mod: PBBFAC,HCNC,, | Performed by: INTERNAL MEDICINE

## 2023-11-15 PROCEDURE — 3078F DIAST BP <80 MM HG: CPT | Mod: HCNC,CPTII,S$GLB, | Performed by: INTERNAL MEDICINE

## 2023-11-15 PROCEDURE — 99999 PR PBB SHADOW E&M-EST. PATIENT-LVL III: ICD-10-PCS | Mod: PBBFAC,HCNC,, | Performed by: INTERNAL MEDICINE

## 2023-11-15 PROCEDURE — 3008F PR BODY MASS INDEX (BMI) DOCUMENTED: ICD-10-PCS | Mod: HCNC,CPTII,S$GLB, | Performed by: INTERNAL MEDICINE

## 2023-11-15 PROCEDURE — 3288F FALL RISK ASSESSMENT DOCD: CPT | Mod: HCNC,CPTII,S$GLB, | Performed by: INTERNAL MEDICINE

## 2023-11-15 PROCEDURE — 3044F PR MOST RECENT HEMOGLOBIN A1C LEVEL <7.0%: ICD-10-PCS | Mod: HCNC,CPTII,S$GLB, | Performed by: INTERNAL MEDICINE

## 2023-11-15 PROCEDURE — 1101F PR PT FALLS ASSESS DOC 0-1 FALLS W/OUT INJ PAST YR: ICD-10-PCS | Mod: HCNC,CPTII,S$GLB, | Performed by: INTERNAL MEDICINE

## 2023-11-15 PROCEDURE — 3075F PR MOST RECENT SYSTOLIC BLOOD PRESS GE 130-139MM HG: ICD-10-PCS | Mod: HCNC,CPTII,S$GLB, | Performed by: INTERNAL MEDICINE

## 2023-11-15 PROCEDURE — 99213 OFFICE O/P EST LOW 20 MIN: CPT | Mod: HCNC,S$GLB,, | Performed by: INTERNAL MEDICINE

## 2023-11-15 PROCEDURE — 1126F AMNT PAIN NOTED NONE PRSNT: CPT | Mod: HCNC,CPTII,S$GLB, | Performed by: INTERNAL MEDICINE

## 2023-11-15 PROCEDURE — 1159F MED LIST DOCD IN RCRD: CPT | Mod: HCNC,CPTII,S$GLB, | Performed by: INTERNAL MEDICINE

## 2023-11-15 PROCEDURE — 1126F PR PAIN SEVERITY QUANTIFIED, NO PAIN PRESENT: ICD-10-PCS | Mod: HCNC,CPTII,S$GLB, | Performed by: INTERNAL MEDICINE

## 2023-11-15 PROCEDURE — 1159F PR MEDICATION LIST DOCUMENTED IN MEDICAL RECORD: ICD-10-PCS | Mod: HCNC,CPTII,S$GLB, | Performed by: INTERNAL MEDICINE

## 2023-11-15 PROCEDURE — 3075F SYST BP GE 130 - 139MM HG: CPT | Mod: HCNC,CPTII,S$GLB, | Performed by: INTERNAL MEDICINE

## 2023-11-15 PROCEDURE — 3044F HG A1C LEVEL LT 7.0%: CPT | Mod: HCNC,CPTII,S$GLB, | Performed by: INTERNAL MEDICINE

## 2023-11-15 PROCEDURE — 3288F PR FALLS RISK ASSESSMENT DOCUMENTED: ICD-10-PCS | Mod: HCNC,CPTII,S$GLB, | Performed by: INTERNAL MEDICINE

## 2023-11-15 PROCEDURE — 99213 PR OFFICE/OUTPT VISIT, EST, LEVL III, 20-29 MIN: ICD-10-PCS | Mod: HCNC,S$GLB,, | Performed by: INTERNAL MEDICINE

## 2023-11-15 PROCEDURE — 1101F PT FALLS ASSESS-DOCD LE1/YR: CPT | Mod: HCNC,CPTII,S$GLB, | Performed by: INTERNAL MEDICINE

## 2023-11-15 PROCEDURE — 3008F BODY MASS INDEX DOCD: CPT | Mod: HCNC,CPTII,S$GLB, | Performed by: INTERNAL MEDICINE

## 2023-11-15 PROCEDURE — 3078F PR MOST RECENT DIASTOLIC BLOOD PRESSURE < 80 MM HG: ICD-10-PCS | Mod: HCNC,CPTII,S$GLB, | Performed by: INTERNAL MEDICINE

## 2023-11-15 NOTE — TELEPHONE ENCOUNTER
----- Message from Heather Ta sent at 11/15/2023  7:46 AM CST -----  Type:  Needs Medical Advice    Who Called: pt     Would the patient rather a call back or a response via [employer]? call  Best Call Back Number: [phone]  Additional Information: pt is requesting to get a return call pt has some questions /concerns about the appointment that is scheduled for 11/15/23 attn nurse barton

## 2023-11-21 NOTE — PROGRESS NOTES
"PATIENT: Ascencion Owens  MRN: [id number]  DATE: 11/21/2023    Subjective:     Chief complaint:  Chief Complaint   Patient presents with    thrombophilia       Interval History: Mr. Owens returns for follow up while on Eliquis for DVT/PE. He has not had any issues since his last appointment earlier this year. Tolerating Eliquis well. Discussed again that he would need to remain on it indefinitely. He is aware.      Review of Systems   A comprehensive review of systems was performed; pertinent positives and negatives are noted in the HPI.        Objective:      Vitals:   Vitals:    11/15/23 0949   BP: 137/68   BP Location: Left arm   Patient Position: Sitting   BP Method: Medium (Automatic)   Pulse: 65   Resp: 16   SpO2: 98%   Weight: 100.7 kg (222 lb 0.1 oz)   Height: 5' 9" (1.753 m)     BMI: Body mass index is 32.78 kg/m².      Physical Exam:   Physical Exam  Vitals and nursing note reviewed.   Constitutional:       General: He is not in acute distress.     Appearance: Normal appearance. He is normal weight. He is not toxic-appearing.   HENT:      Head: Normocephalic and atraumatic.   Eyes:      General: No scleral icterus.     Conjunctiva/sclera: Conjunctivae normal.   Cardiovascular:      Rate and Rhythm: Normal rate and regular rhythm.      Heart sounds: Normal heart sounds.   Pulmonary:      Effort: Pulmonary effort is normal.      Breath sounds: Normal breath sounds. No wheezing, rhonchi or rales.   Musculoskeletal:         General: No tenderness or deformity.      Cervical back: Neck supple.   Lymphadenopathy:      Cervical: No cervical adenopathy.   Skin:     General: Skin is warm and dry.      Coloration: Skin is not jaundiced.      Findings: No bruising or erythema.   Neurological:      General: No focal deficit present.      Mental Status: He is alert and oriented to person, place, and time. Mental status is at baseline.   Psychiatric:         Mood and Affect: Mood normal.         Behavior: Behavior " normal.         Thought Content: Thought content normal.           Laboratory Data:  WBC   Date Value Ref Range Status   03/13/2023 6.30 3.90 - 12.70 K/uL Final     Hemoglobin   Date Value Ref Range Status   03/13/2023 14.8 14.0 - 18.0 g/dL Final     Hematocrit   Date Value Ref Range Status   03/13/2023 45.4 40.0 - 54.0 % Final     Platelets   Date Value Ref Range Status   03/13/2023 205 150 - 450 K/uL Final     Gran # (ANC)   Date Value Ref Range Status   03/13/2023 3.8 1.8 - 7.7 K/uL Final     Gran %   Date Value Ref Range Status   03/13/2023 60.0 38.0 - 73.0 % Final       Chemistry        Component Value Date/Time     03/13/2023 1037    K 4.2 03/13/2023 1037     03/13/2023 1037    CO2 28 03/13/2023 1037    BUN 13 03/13/2023 1037    CREATININE 1.1 03/13/2023 1037    GLU 91 03/13/2023 1037        Component Value Date/Time    CALCIUM 9.5 03/13/2023 1037    ALKPHOS 64 03/13/2023 1037    AST 18 03/13/2023 1037    ALT 17 03/13/2023 1037    BILITOT 0.6 03/13/2023 1037    ESTGFRAFRICA >60.0 06/07/2022 0912    EGFRNONAA >60.0 06/07/2022 0912              Assessment/Plan:     1. History of pulmonary embolism    2. Essential hypertension      Continue eliquis indefinitely. Tolerating well.   RTC 1 yr or sooner prn.     Med and Orders:       Follow Up:  Follow up in about 1 year (around 11/15/2024).      Above care plan was discussed with patient and all questions were addressed to their expressed satisfaction.       Shahbaz Sexton MD, FACP  Hematology & Medical Oncology  [facility]

## 2024-01-17 ENCOUNTER — TELEPHONE (OUTPATIENT)
Dept: HEMATOLOGY/ONCOLOGY | Facility: CLINIC | Age: 69
End: 2024-01-17
Payer: MEDICARE

## 2024-01-17 DIAGNOSIS — I82.432 ACUTE DEEP VEIN THROMBOSIS (DVT) OF POPLITEAL VEIN OF LEFT LOWER EXTREMITY: ICD-10-CM

## 2024-01-17 NOTE — TELEPHONE ENCOUNTER
Spoke with patient he said that he has enough Eliquis to last him the end of today but will need enough pills to get him through to the beginning of next week when his shipment will get delivered to him. He said he spoke with the pharmacy and they had issues so it was a delay with shipping out on time but they shipped them yesterday and said it will take up to a week to be delivered. I told him that I will let Dr. Sexton know so he can send him a few to hold him over but the only issue is that the pharmacy may not want to pay for it but to let me know if not so I can see what I can do to help the process.      ----- Message from Roseann Gonzalez sent at 1/17/2024 11:12 AM CST -----  Type:  Needs Medical Advice     Who Called: LEROY ALFREDO [[id number]]      Pharmacy name and phone #: [facility] [address] [place] [address] [phone]        Would the patient rather a call back or a response via [facility]? Call back     Best Call Back Number:  [phone]    Additional Information: Pt medication apixaban (ELIQUIS) 5 mg Tab will take 4 to 5 days to be sent to him he would like to know if possible if a couple of days supply can be called in to his pharmacy.

## 2024-01-19 ENCOUNTER — TELEPHONE (OUTPATIENT)
Dept: HEMATOLOGY/ONCOLOGY | Facility: CLINIC | Age: 69
End: 2024-01-19
Payer: MEDICARE

## 2024-01-19 NOTE — TELEPHONE ENCOUNTER
----- Message from Mayra Sierra sent at 1/19/2024  1:54 PM CST -----  Type:  Needs Medical Advice    Who Called: Pt   Would the patient rather a call back or a response via [employer]? Call back   Best Call Back Number: [phone]  Additional Information:  Pt is requesting a call back from this provider office

## 2024-01-20 ENCOUNTER — NURSE TRIAGE (OUTPATIENT)
Dept: ADMINISTRATIVE | Facility: CLINIC | Age: 69
End: 2024-01-20
Payer: MEDICARE

## 2024-01-20 NOTE — TELEPHONE ENCOUNTER
Problem with Eliquis 5mg, normally comes from [facility], delayed due to CC error. Took his last dose on Wednesday. He has been on RX since March. Was told my [employer] insurance will not fill yet. He would have to pay 150.00 for RX. Advised I would call [facility] to clarify and call him back.   Spoke with Bartolome at [facility], he could purchase 4 tablets for 37 dollars with coupon. Advised I would let patient know. Dr. Sexton, the ordering provider is on call.   Report to Dr. Sexton re inability to fill [facility]. Ok for patient to wait until Monday due to length of time on RX. Patient called back and advised. Also advised of Bartolome, at [facility] assisting with price if needed and can purchase 4 pills for 37.00, with use of their coupon. Verb understanding. No further questions or concerns at this time.   Reason for Disposition   [1] Prescription refill request for ESSENTIAL medicine (i.e., likelihood of harm to patient if not taken) AND [2] triager unable to refill per department policy    Protocols used: Medication Refill and Renewal Call-A-

## 2024-01-22 ENCOUNTER — PATIENT MESSAGE (OUTPATIENT)
Dept: HEMATOLOGY/ONCOLOGY | Facility: CLINIC | Age: 69
End: 2024-01-22
Payer: MEDICARE

## 2024-02-01 DIAGNOSIS — E78.5 HYPERLIPIDEMIA, ACQUIRED: ICD-10-CM

## 2024-02-01 DIAGNOSIS — I10 ESSENTIAL HYPERTENSION: ICD-10-CM

## 2024-02-01 NOTE — TELEPHONE ENCOUNTER
----- Message from [place] sent at 2/1/2024  3:49 PM CST -----  Type: RX Refill Request    Who Called: [facility] pharmacy [phone]    Have you contacted your pharmacy:    Refill or New Rx:  amLODIPine (NORVASC) 10 MG tablet        RX Name and Strength:    How is the patient currently taking it? (ex. 1XDay):    Is this a 30 day or 90 day RX:    Preferred Pharmacy with phone number:  [facility] - [place], [place] - [address]  [address] [address]  Phone: [phone] Fax: [phone]          Local or Mail Order:    Ordering Provider:    Would the patient rather a call back or a response via [facility]? call    Best Call Back Number:[phone] (home)       Additional Information:

## 2024-02-02 RX ORDER — AMLODIPINE BESYLATE 10 MG/1
10 TABLET ORAL DAILY
Qty: 90 TABLET | Refills: 3 | Status: SHIPPED | OUTPATIENT
Start: 2024-02-02 | End: 2024-03-21

## 2024-02-02 RX ORDER — ATORVASTATIN CALCIUM 20 MG/1
20 TABLET, FILM COATED ORAL DAILY
Qty: 90 TABLET | Refills: 3 | Status: SHIPPED | OUTPATIENT
Start: 2024-02-02 | End: 2024-02-06 | Stop reason: SDUPTHER

## 2024-02-05 ENCOUNTER — PATIENT OUTREACH (OUTPATIENT)
Dept: ADMINISTRATIVE | Facility: HOSPITAL | Age: 69
End: 2024-02-05
Payer: MEDICARE

## 2024-02-05 ENCOUNTER — PATIENT MESSAGE (OUTPATIENT)
Dept: ADMINISTRATIVE | Facility: HOSPITAL | Age: 69
End: 2024-02-05
Payer: MEDICARE

## 2024-02-05 DIAGNOSIS — E78.5 HYPERLIPIDEMIA, ACQUIRED: ICD-10-CM

## 2024-02-05 NOTE — PROGRESS NOTES
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 If want to follow Dr Parson can go to [facility] at [address] [place] [address] [address], [place] [address] - phone: [phone].   Unable to reach - sent portal message.  Further Action Needed If Patient Returns Outreach:      Please advise of message above.      Updates Requested / Reviewed:     [x]  Care Everywhere    []     []  External Sources ([facility], [facility], DIS, etc.)    [] [facility]   [] [facility]   [] Other:    [x]  Care Team Updated   []  Removed  or Duplicate Orders   []  Immunization Reconciliation Completed / Queried    [] [place]   [] [place]   [] [place]   [] [place]      Health Maintenance Topics Addressed and Outreach Outcomes / Actions Taken:             Breast Cancer Screening []  Mammogram Order Placed    []  Mammogram Screening Scheduled    []  External Records Requested & Care Team Updated if Applicable    []  External Records Uploaded & Care Team Updated if Applicable    []  Pt Declined Scheduling Mammogram    []  Pt Will Schedule with External Provider / Order Routed & Care Team Updated if Applicable              Cervical Cancer Screening []  Pap Smear Scheduled in Primary Care or OBGYN    []  External Records Requested & Care Team Updated if Applicable       []  External Records Uploaded, Care Team Updated, & History Updated if Applicable    []  Patient Declined Scheduling Pap Smear    []  Patient Will Schedule with External Provider & Care Team Updated if Applicable                  Colorectal Cancer Screening []  Colonoscopy Case Request / Referral / Home Test Order Placed    []  External Records Requested & Care Team Updated if Applicable    []  External Records Uploaded, Care Team Updated, & History Updated if Applicable    []  Patient Declined Completing Colon Cancer  Screening    []  Patient Will Schedule with External Provider & Care Team Updated if Applicable    []  Fit Kit Mailed (add the SmartPhrase under additional notes)    []  Reminded Patient to Complete Home Test                Diabetic Eye Exam []  Eye Exam Screening Order Placed    []  Eye Camera Scheduled or Optometry/Ophthalmology Referral Placed    []  External Records Requested & Care Team Updated if Applicable    []  External Records Uploaded, Care Team Updated, & History Updated if Applicable    []  Patient Declined Scheduling Eye Exam    []  Patient Will Schedule with External Provider & Care Team Updated if Applicable             Blood Pressure Control []  Primary Care Follow Up Visit Scheduled     []  Remote Blood Pressure Reading Captured    []  Patient Declined Remote Reading or Scheduling Appt - Escalated to PCP    []  Patient Will Call Back or Send Portal Message with Reading                 HbA1c & Other Labs []  Overdue Lab(s) Ordered    []  Overdue Lab(s) Scheduled    []  External Records Uploaded & Care Team Updated if Applicable    []  Primary Care Follow Up Visit Scheduled     []  Reminded Patient to Complete A1c Home Test    []  Patient Declined Scheduling Labs or Will Call Back to Schedule    []  Patient Will Schedule with External Provider / Order Routed, & Care Team Updated if Applicable           Primary Care Appointment []  Primary Care Appt Scheduled    []  Patient Declined Scheduling or Will Call Back to Schedule    []  Pt Established with External Provider, Updated Care Team, & Informed Pt to Notify Payor if Applicable           Medication Adherence /    Statin Use []  Primary Care Appointment Scheduled    []  Patient Reminded to  Prescription    []  Patient Declined, Provider Notified if Needed    []  Sent Provider Message to Review to Evaluate Pt for Statin, Add Exclusion Dx Codes, Document   Exclusion in Problem List, Change Statin Intensity Level to Moderate or High Intensity if  Applicable                Osteoporosis Screening []  Dexa Order Placed    []  Dexa Appointment Scheduled    []  External Records Requested & Care Team Updated    []  External Records Uploaded, Care Team Updated, & History Updated if Applicable    []  Patient Declined Scheduling Dexa or Will Call Back to Schedule    []  Patient Will Schedule with External Provider / Order Routed & Care Team Updated if Applicable       Additional Notes:

## 2024-02-06 ENCOUNTER — TELEPHONE (OUTPATIENT)
Dept: FAMILY MEDICINE | Facility: CLINIC | Age: 69
End: 2024-02-06
Payer: MEDICARE

## 2024-02-06 RX ORDER — ATORVASTATIN CALCIUM 20 MG/1
20 TABLET, FILM COATED ORAL DAILY
Qty: 30 TABLET | Refills: 0 | Status: SHIPPED | OUTPATIENT
Start: 2024-02-06 | End: 2024-05-09 | Stop reason: SDUPTHER

## 2024-02-06 NOTE — TELEPHONE ENCOUNTER
----- Message from Lexie Mina sent at 2/6/2024 10:14 AM CST -----  Regarding: [id number]  Type: RX Refill Request    Who Called: self     Have you contacted your pharmacy:yes     Refill or New Rx:refill     RX Name and Strength:atorvastatin (LIPITOR) 20 MG tablet    Preferred Pharmacy with phone number:     [id number] - [place], [place] - [address] [place] [address] [address]  [facility] [address]  Phone: [phone] Fax: [phone]         Local or Mail Order:local     Ordering Provider:Josselin     Would the patient rather a call back or a response via [facility]? Call back     Best Call Back Number:[phone]       Additional Information: Pt states he is at his last pill, he states he does not know when his mail service will bring his meds.

## 2024-03-14 ENCOUNTER — OFFICE VISIT (OUTPATIENT)
Dept: FAMILY MEDICINE | Facility: CLINIC | Age: 69
End: 2024-03-14
Payer: MEDICARE

## 2024-03-14 VITALS
DIASTOLIC BLOOD PRESSURE: 74 MMHG | TEMPERATURE: 98 F | BODY MASS INDEX: 32.95 KG/M2 | OXYGEN SATURATION: 98 % | HEART RATE: 62 BPM | WEIGHT: 223.13 LBS | SYSTOLIC BLOOD PRESSURE: 128 MMHG

## 2024-03-14 DIAGNOSIS — Z13.6 SCREENING FOR AAA (AORTIC ABDOMINAL ANEURYSM): ICD-10-CM

## 2024-03-14 DIAGNOSIS — R79.9 ABNORMAL FINDING OF BLOOD CHEMISTRY, UNSPECIFIED: ICD-10-CM

## 2024-03-14 DIAGNOSIS — R35.1 NOCTURIA: ICD-10-CM

## 2024-03-14 DIAGNOSIS — Z00.00 ANNUAL PHYSICAL EXAM: Primary | ICD-10-CM

## 2024-03-14 DIAGNOSIS — F51.04 PSYCHOPHYSIOLOGICAL INSOMNIA: ICD-10-CM

## 2024-03-14 DIAGNOSIS — Z12.5 ENCOUNTER FOR SCREENING FOR MALIGNANT NEOPLASM OF PROSTATE: ICD-10-CM

## 2024-03-14 DIAGNOSIS — I10 ESSENTIAL HYPERTENSION: ICD-10-CM

## 2024-03-14 PROCEDURE — 1101F PT FALLS ASSESS-DOCD LE1/YR: CPT | Mod: CPTII,S$GLB,, | Performed by: INTERNAL MEDICINE

## 2024-03-14 PROCEDURE — 3074F SYST BP LT 130 MM HG: CPT | Mod: CPTII,S$GLB,, | Performed by: INTERNAL MEDICINE

## 2024-03-14 PROCEDURE — 1159F MED LIST DOCD IN RCRD: CPT | Mod: CPTII,S$GLB,, | Performed by: INTERNAL MEDICINE

## 2024-03-14 PROCEDURE — 1126F AMNT PAIN NOTED NONE PRSNT: CPT | Mod: CPTII,S$GLB,, | Performed by: INTERNAL MEDICINE

## 2024-03-14 PROCEDURE — 3288F FALL RISK ASSESSMENT DOCD: CPT | Mod: CPTII,S$GLB,, | Performed by: INTERNAL MEDICINE

## 2024-03-14 PROCEDURE — 99999 PR PBB SHADOW E&M-EST. PATIENT-LVL III: CPT | Mod: PBBFAC,HCNC,, | Performed by: INTERNAL MEDICINE

## 2024-03-14 PROCEDURE — 3078F DIAST BP <80 MM HG: CPT | Mod: CPTII,S$GLB,, | Performed by: INTERNAL MEDICINE

## 2024-03-14 PROCEDURE — 99214 OFFICE O/P EST MOD 30 MIN: CPT | Mod: S$GLB,,, | Performed by: INTERNAL MEDICINE

## 2024-03-14 PROCEDURE — 3008F BODY MASS INDEX DOCD: CPT | Mod: CPTII,S$GLB,, | Performed by: INTERNAL MEDICINE

## 2024-03-14 PROCEDURE — 1160F RVW MEDS BY RX/DR IN RCRD: CPT | Mod: CPTII,S$GLB,, | Performed by: INTERNAL MEDICINE

## 2024-03-14 NOTE — PROGRESS NOTES
"Chief Complaint: Establish Care, Follow-up, and Insomnia (Pt states that they only sleep good for about 3 hours and about every 3-4 days pt may have a good night's rest)      Ascencion Owens  is a 68 y.o. year old patient who presents today for the following issues     Insomnia: no issues falling asleep but does wake up with stress and/or nocturia   Nocturia: "good days" goes 2-3 times a day   Does have a lot of stress from work issues and with the terminal illness of his brother in law     Past Surgical History:   Procedure Laterality Date    APPENDECTOMY      COLONOSCOPY  2004    FRACTURE SURGERY  Ankle [employer] football    lt ankle surgery  1973    VASECTOMY  Around         Family History   Problem Relation Age of Onset    Diabetes Mother     Hypertension Mother     Heart disease Mother     Arthritis Mother     Arthritis Father     Arthritis Sister         Social History     Socioeconomic History    Marital status:    Tobacco Use    Smoking status: Former     Types: Cigars     Start date:      Quit date:      Years since quittin.2    Smokeless tobacco: Never    Tobacco comments:     occ   Substance and Sexual Activity    Alcohol use: Yes     Alcohol/week: 1.0 standard drink of alcohol     Types: 1 Shots of liquor per week     Comment: occasionally    Drug use: No    Sexual activity: Yes     Partners: Female     Social Determinants of Health     Financial Resource Strain: Low Risk  (2022)    Overall Financial Resource Strain (CARDIA)     Difficulty of Paying Living Expenses: Not hard at all   Food Insecurity: No Food Insecurity (2022)    Hunger Vital Sign     Worried About Running Out of Food in the Last Year: Never true     Ran Out of Food in the Last Year: Never true   Transportation Needs: No Transportation Needs (2022)    PRAPARE - Transportation     Lack of Transportation (Medical): No     Lack of Transportation (Non-Medical): No   Physical Activity: " Sufficiently Active (6/8/2022)    Exercise Vital Sign     Days of Exercise per Week: 3 days     Minutes of Exercise per Session: 150+ min   Stress: Stress Concern Present (6/8/2022)    [place] [employer] - Occupational Stress Questionnaire     Feeling of Stress : Rather much   Social Connections: Unknown (6/8/2022)    Social Connection and Isolation Panel [NHANES]     Frequency of Communication with Friends and Family: More than three times a week     Frequency of Social Gatherings with Friends and Family: Once a week     Active Member of Clubs or Organizations: Yes     Attends Club or Organization Meetings: More than 4 times per year     Marital Status:    Housing Stability: Low Risk  (6/8/2022)    Housing Stability Vital Sign     Unable to Pay for Housing in the Last Year: No     Number of Places Lived in the Last Year: 1     Unstable Housing in the Last Year: No         Current Outpatient Medications:     amLODIPine (NORVASC) 10 MG tablet, Take 1 tablet (10 mg total) by mouth once daily., Disp: 90 tablet, Rfl: 3    atorvastatin (LIPITOR) 20 MG tablet, Take 1 tablet (20 mg total) by mouth once daily., Disp: 30 tablet, Rfl: 0    tiZANidine (ZANAFLEX) 4 MG tablet, TAKE 1 TO 2 TABLETS BY MOUTH NIGHTLY AS NEEDED FOR PAIN, Disp: 180 tablet, Rfl: 2    ketorolac 0.5% (ACULAR) 0.5 % Drop, Place into the left eye., Disp: , Rfl:      Review of Systems   HENT:  Negative for congestion.    Eyes:  Negative for blurred vision.   Respiratory:  Negative for cough and shortness of breath.    Cardiovascular:  Negative for chest pain and leg swelling.   Gastrointestinal:  Negative for abdominal pain, blood in stool, constipation, diarrhea, melena and nausea.   Genitourinary:  Negative for dysuria.        Nocturia   Musculoskeletal:  Negative for joint pain.   Neurological:  Negative for headaches.   Psychiatric/Behavioral:  Negative for depression. The patient is nervous/anxious and has insomnia.          Objective:      Vitals:    03/14/24 1406   BP: 128/74   Pulse: 62   Temp: 98.2 °F (36.8 °C)       Physical Exam  Vitals and nursing note reviewed.   Constitutional:       Appearance: Normal appearance. He is obese.   HENT:      Head: Normocephalic and atraumatic.   Cardiovascular:      Rate and Rhythm: Normal rate and regular rhythm.   Pulmonary:      Effort: Pulmonary effort is normal.      Breath sounds: Normal breath sounds. No wheezing or rales.   Abdominal:      General: Bowel sounds are normal.      Palpations: Abdomen is soft.      Tenderness: There is no abdominal tenderness.   Musculoskeletal:      Right lower leg: No edema.      Left lower leg: No edema.   Skin:     General: Skin is warm and dry.   Neurological:      General: No focal deficit present.      Mental Status: He is alert and oriented to person, place, and time.   Psychiatric:         Mood and Affect: Mood normal.         Behavior: Behavior normal.          Assessment:       1. Annual physical exam    2. Abnormal finding of blood chemistry, unspecified    3. Encounter for screening for malignant neoplasm of prostate    4. Screening for AAA (aortic abdominal aneurysm)    5. Essential hypertension    6. Psychophysiological insomnia    7. Nocturia          Plan:   1. Annual physical exam  Assessment & Plan:  Discussed HCM with patient  - patient UTD with vaccines. Discussed the importance of vaccines.   - annual labs ordered  - Last Colonoscopy completed on 6/10/2015. Due 2025  - discussed benefits and risks of screening for prostate cancer. PSA ordered   - advised patient to follow up with ophthalmologist and dentist every year  - reviewed medical, surgical, family and social history      Orders:  -     CBC Auto Differential; Future; Expected date: 03/14/2024  -     Comprehensive Metabolic Panel; Future; Expected date: 03/14/2024  -     Hemoglobin A1C; Future; Expected date: 03/14/2024  -     Lipid Panel; Future; Expected date: 03/14/2024  -    "  PSA, Screening; Future; Expected date: 03/14/2024    2. Abnormal finding of blood chemistry, unspecified  -     CBC Auto Differential; Future; Expected date: 03/14/2024  -     Hemoglobin A1C; Future; Expected date: 03/14/2024  -     Lipid Panel; Future; Expected date: 03/14/2024    3. Encounter for screening for malignant neoplasm of prostate  -     PSA, Screening; Future; Expected date: 03/14/2024    4. Screening for AAA (aortic abdominal aneurysm)  -      AAA Screening; Future; Expected date: 03/14/2024    5. Essential hypertension  Assessment & Plan:  Chronic, controlled     - continue amlodipine         6. Psychophysiological insomnia  Assessment & Plan:  Does have a lot of stress from work issues and with the terminal illness of his brother in law     - counseled on sleep hygiene   - ok for patient to use OTC avinol PM   - recommended meditation       7. Nocturia  Assessment & Plan:  Took tamsulosin at night   "good days" goes 2-3 times a day     - recommended Flomax in the morning   - counseled on lifestyle changes to help with nocturia            Follow up in about 6 months (around 9/14/2024) for f/up.         "

## 2024-03-14 NOTE — PROGRESS NOTES
Health Maintenance Due   Topic     Abdominal Aortic Aneurysm Screening  Consult pcp    COVID-19 Vaccine (9 - 2023-24 season) Not offered at this office    Hemoglobin A1c (Prediabetes)  Consult pcp

## 2024-03-14 NOTE — ASSESSMENT & PLAN NOTE
Does have a lot of stress from work issues and with the terminal illness of his brother in law     - counseled on sleep hygiene   - ok for patient to use OTC avinol PM   - recommended meditation

## 2024-03-14 NOTE — ASSESSMENT & PLAN NOTE
"Took tamsulosin at night   "good days" goes 2-3 times a day     - recommended Flomax in the morning   - counseled on lifestyle changes to help with nocturia   "

## 2024-03-14 NOTE — ASSESSMENT & PLAN NOTE
Discussed HCM with patient  - patient UTD with vaccines. Discussed the importance of vaccines.   - annual labs ordered  - Last Colonoscopy completed on 6/10/2015. Due 2025  - discussed benefits and risks of screening for prostate cancer. PSA ordered   - advised patient to follow up with ophthalmologist and dentist every year  - reviewed medical, surgical, family and social history

## 2024-03-19 ENCOUNTER — LAB VISIT (OUTPATIENT)
Dept: LAB | Facility: HOSPITAL | Age: 69
End: 2024-03-19
Attending: INTERNAL MEDICINE
Payer: MEDICARE

## 2024-03-19 ENCOUNTER — PATIENT MESSAGE (OUTPATIENT)
Dept: FAMILY MEDICINE | Facility: CLINIC | Age: 69
End: 2024-03-19
Payer: MEDICARE

## 2024-03-19 DIAGNOSIS — Z12.5 ENCOUNTER FOR SCREENING FOR MALIGNANT NEOPLASM OF PROSTATE: ICD-10-CM

## 2024-03-19 DIAGNOSIS — Z00.00 ANNUAL PHYSICAL EXAM: ICD-10-CM

## 2024-03-19 DIAGNOSIS — R79.9 ABNORMAL FINDING OF BLOOD CHEMISTRY, UNSPECIFIED: ICD-10-CM

## 2024-03-19 LAB
ALBUMIN SERPL BCP-MCNC: 3.8 G/DL (ref 3.5–5.2)
ALP SERPL-CCNC: 67 U/L (ref 55–135)
ALT SERPL W/O P-5'-P-CCNC: 16 U/L (ref 10–44)
ANION GAP SERPL CALC-SCNC: 9 MMOL/L (ref 8–16)
AST SERPL-CCNC: 21 U/L (ref 10–40)
BASOPHILS # BLD AUTO: 0.05 K/UL (ref 0–0.2)
BASOPHILS NFR BLD: 0.7 % (ref 0–1.9)
BILIRUB SERPL-MCNC: 0.8 MG/DL (ref 0.1–1)
BUN SERPL-MCNC: 12 MG/DL (ref 8–23)
CALCIUM SERPL-MCNC: 9.5 MG/DL (ref 8.7–10.5)
CHLORIDE SERPL-SCNC: 105 MMOL/L (ref 95–110)
CHOLEST SERPL-MCNC: 162 MG/DL (ref 120–199)
CHOLEST/HDLC SERPL: 3.5 {RATIO} (ref 2–5)
CO2 SERPL-SCNC: 30 MMOL/L (ref 23–29)
COMPLEXED PSA SERPL-MCNC: 0.48 NG/ML (ref 0–4)
CREAT SERPL-MCNC: 1.2 MG/DL (ref 0.5–1.4)
DIFFERENTIAL METHOD BLD: NORMAL
EOSINOPHIL # BLD AUTO: 0.1 K/UL (ref 0–0.5)
EOSINOPHIL NFR BLD: 1.9 % (ref 0–8)
ERYTHROCYTE [DISTWIDTH] IN BLOOD BY AUTOMATED COUNT: 13.8 % (ref 11.5–14.5)
EST. GFR  (NO RACE VARIABLE): >60 ML/MIN/1.73 M^2
ESTIMATED AVG GLUCOSE: 120 MG/DL (ref 68–131)
GLUCOSE SERPL-MCNC: 95 MG/DL (ref 70–110)
HBA1C MFR BLD: 5.8 % (ref 4–5.6)
HCT VFR BLD AUTO: 46.5 % (ref 40–54)
HDLC SERPL-MCNC: 46 MG/DL (ref 40–75)
HDLC SERPL: 28.4 % (ref 20–50)
HGB BLD-MCNC: 15.2 G/DL (ref 14–18)
IMM GRANULOCYTES # BLD AUTO: 0.01 K/UL (ref 0–0.04)
IMM GRANULOCYTES NFR BLD AUTO: 0.1 % (ref 0–0.5)
LDLC SERPL CALC-MCNC: 103.6 MG/DL (ref 63–159)
LYMPHOCYTES # BLD AUTO: 2.4 K/UL (ref 1–4.8)
LYMPHOCYTES NFR BLD: 34.4 % (ref 18–48)
MCH RBC QN AUTO: 27.3 PG (ref 27–31)
MCHC RBC AUTO-ENTMCNC: 32.7 G/DL (ref 32–36)
MCV RBC AUTO: 84 FL (ref 82–98)
MONOCYTES # BLD AUTO: 0.7 K/UL (ref 0.3–1)
MONOCYTES NFR BLD: 9.9 % (ref 4–15)
NEUTROPHILS # BLD AUTO: 3.7 K/UL (ref 1.8–7.7)
NEUTROPHILS NFR BLD: 53 % (ref 38–73)
NONHDLC SERPL-MCNC: 116 MG/DL
NRBC BLD-RTO: 0 /100 WBC
PLATELET # BLD AUTO: 205 K/UL (ref 150–450)
PMV BLD AUTO: 11.8 FL (ref 9.2–12.9)
POTASSIUM SERPL-SCNC: 3.9 MMOL/L (ref 3.5–5.1)
PROT SERPL-MCNC: 6.7 G/DL (ref 6–8.4)
RBC # BLD AUTO: 5.57 M/UL (ref 4.6–6.2)
SODIUM SERPL-SCNC: 144 MMOL/L (ref 136–145)
TRIGL SERPL-MCNC: 62 MG/DL (ref 30–150)
WBC # BLD AUTO: 6.89 K/UL (ref 3.9–12.7)

## 2024-03-19 PROCEDURE — 80061 LIPID PANEL: CPT | Performed by: INTERNAL MEDICINE

## 2024-03-19 PROCEDURE — 80053 COMPREHEN METABOLIC PANEL: CPT | Performed by: INTERNAL MEDICINE

## 2024-03-19 PROCEDURE — 84153 ASSAY OF PSA TOTAL: CPT | Performed by: INTERNAL MEDICINE

## 2024-03-19 PROCEDURE — 85025 COMPLETE CBC W/AUTO DIFF WBC: CPT | Performed by: INTERNAL MEDICINE

## 2024-03-19 PROCEDURE — 83036 HEMOGLOBIN GLYCOSYLATED A1C: CPT | Performed by: INTERNAL MEDICINE

## 2024-03-19 PROCEDURE — 36415 COLL VENOUS BLD VENIPUNCTURE: CPT | Mod: PO | Performed by: INTERNAL MEDICINE

## 2024-03-20 DIAGNOSIS — I10 ESSENTIAL HYPERTENSION: ICD-10-CM

## 2024-03-21 RX ORDER — AMLODIPINE BESYLATE 10 MG/1
10 TABLET ORAL
Qty: 90 TABLET | Refills: 3 | Status: SHIPPED | OUTPATIENT
Start: 2024-03-21

## 2024-03-21 NOTE — TELEPHONE ENCOUNTER
Last Office Visit Info:   The patient's last visit with Brittnee Hoskins MD was on 3/14/2024.    The patient's last visit in current department was on 3/14/2024.        Last CBC Results:   Lab Results   Component Value Date    WBC 6.89 03/19/2024    HGB 15.2 03/19/2024    HCT 46.5 03/19/2024     03/19/2024       Last CMP Results  Lab Results   Component Value Date     03/19/2024    K 3.9 03/19/2024     03/19/2024    CO2 30 (H) 03/19/2024    BUN 12 03/19/2024    CREATININE 1.2 03/19/2024    CALCIUM 9.5 03/19/2024    ALBUMIN 3.8 03/19/2024    AST 21 03/19/2024    ALT 16 03/19/2024       Last Lipids  Lab Results   Component Value Date    CHOL 162 03/19/2024    TRIG 62 03/19/2024    HDL 46 03/19/2024    LDLCALC 103.6 03/19/2024       Last A1C  Lab Results   Component Value Date    HGBA1C 5.8 (H) 03/19/2024       Last TSH  Lab Results   Component Value Date    TSH 1.341 03/13/2023             Current Med Refills  Medication List with Changes/Refills   Current Medications    AMLODIPINE (NORVASC) 10 MG TABLET    Take 1 tablet (10 mg total) by mouth once daily.       Start Date: 2/2/2024  End Date: --    ATORVASTATIN (LIPITOR) 20 MG TABLET    Take 1 tablet (20 mg total) by mouth once daily.       Start Date: 2/6/2024  End Date: 3/14/2024    KETOROLAC 0.5% (ACULAR) 0.5 % DROP    Place into the left eye.       Start Date: 8/20/2023 End Date: --    TIZANIDINE (ZANAFLEX) 4 MG TABLET    TAKE 1 TO 2 TABLETS BY MOUTH NIGHTLY AS NEEDED FOR PAIN       Start Date: 2/28/2022 End Date: --       Order(s) placed per written order guidelines: none    Please advise.

## 2024-04-01 ENCOUNTER — TELEPHONE (OUTPATIENT)
Dept: HEMATOLOGY/ONCOLOGY | Facility: CLINIC | Age: 69
End: 2024-04-01
Payer: MEDICARE

## 2024-04-01 NOTE — TELEPHONE ENCOUNTER
----- Message from Amelia Elder sent at 4/1/2024 11:48 AM CDT -----  Type:   Call    Who Called:pt  Who Left Message for Patient:Tracie  Does the patient know what this is regarding?:call  Would the patient rather a call back or a response via [facility]? call  Best Call Back Number:[phone]  Additional Information:

## 2024-04-03 ENCOUNTER — PATIENT MESSAGE (OUTPATIENT)
Dept: HEMATOLOGY/ONCOLOGY | Facility: CLINIC | Age: 69
End: 2024-04-03
Payer: MEDICARE

## 2024-04-03 ENCOUNTER — TELEPHONE (OUTPATIENT)
Dept: HEMATOLOGY/ONCOLOGY | Facility: CLINIC | Age: 69
End: 2024-04-03
Payer: MEDICARE

## 2024-04-03 NOTE — TELEPHONE ENCOUNTER
Spoke with patient he said that the number he called that I gave him for copay assistance for Eliquis said he doesn't qualify being he has medicare advantage. I told him that I will speak with Dr. Sexton and see what he wants to do and get back with him. Patient verbalized understanding and will wait for my call.       ----- Message from Veronica Palomo sent at 4/2/2024 11:54 AM CDT -----  Type:  Needs Medical Advice    Who Called: pt  Would the patient rather a call back or a response via [facility]? call  Best Call Back Number:  [phone]  Additional Information: pt would like to speak with deon regarding conversation on yesterday 4.1

## 2024-04-17 DIAGNOSIS — I82.432 ACUTE DEEP VEIN THROMBOSIS (DVT) OF POPLITEAL VEIN OF LEFT LOWER EXTREMITY: ICD-10-CM

## 2024-04-17 DIAGNOSIS — I82.432 ACUTE DEEP VEIN THROMBOSIS (DVT) OF POPLITEAL VEIN OF LEFT LOWER EXTREMITY: Primary | ICD-10-CM

## 2024-05-09 DIAGNOSIS — E78.5 HYPERLIPIDEMIA, ACQUIRED: ICD-10-CM

## 2024-05-10 RX ORDER — ATORVASTATIN CALCIUM 20 MG/1
20 TABLET, FILM COATED ORAL DAILY
Qty: 90 TABLET | Refills: 3 | Status: SHIPPED | OUTPATIENT
Start: 2024-05-10 | End: 2025-05-10

## 2024-05-16 ENCOUNTER — LAB VISIT (OUTPATIENT)
Dept: LAB | Facility: HOSPITAL | Age: 69
End: 2024-05-16
Attending: INTERNAL MEDICINE
Payer: MEDICARE

## 2024-05-16 ENCOUNTER — OFFICE VISIT (OUTPATIENT)
Dept: HEMATOLOGY/ONCOLOGY | Facility: CLINIC | Age: 69
End: 2024-05-16
Payer: MEDICARE

## 2024-05-16 VITALS
DIASTOLIC BLOOD PRESSURE: 57 MMHG | SYSTOLIC BLOOD PRESSURE: 119 MMHG | HEIGHT: 69 IN | WEIGHT: 221.13 LBS | HEART RATE: 60 BPM | OXYGEN SATURATION: 95 % | RESPIRATION RATE: 16 BRPM | BODY MASS INDEX: 32.75 KG/M2

## 2024-05-16 DIAGNOSIS — R53.83 FATIGUE, UNSPECIFIED TYPE: ICD-10-CM

## 2024-05-16 DIAGNOSIS — G47.33 OBSTRUCTIVE SLEEP APNEA SYNDROME: ICD-10-CM

## 2024-05-16 DIAGNOSIS — I82.432 ACUTE DEEP VEIN THROMBOSIS (DVT) OF POPLITEAL VEIN OF LEFT LOWER EXTREMITY: Primary | ICD-10-CM

## 2024-05-16 DIAGNOSIS — I82.432 ACUTE DEEP VEIN THROMBOSIS (DVT) OF POPLITEAL VEIN OF LEFT LOWER EXTREMITY: ICD-10-CM

## 2024-05-16 LAB
ALBUMIN SERPL BCP-MCNC: 3.8 G/DL (ref 3.5–5.2)
ALP SERPL-CCNC: 58 U/L (ref 55–135)
ALT SERPL W/O P-5'-P-CCNC: 16 U/L (ref 10–44)
ANION GAP SERPL CALC-SCNC: 9 MMOL/L (ref 8–16)
AST SERPL-CCNC: 24 U/L (ref 10–40)
BASOPHILS # BLD AUTO: 0.04 K/UL (ref 0–0.2)
BASOPHILS NFR BLD: 0.6 % (ref 0–1.9)
BILIRUB SERPL-MCNC: 0.7 MG/DL (ref 0.1–1)
BUN SERPL-MCNC: 11 MG/DL (ref 8–23)
CALCIUM SERPL-MCNC: 9.7 MG/DL (ref 8.7–10.5)
CHLORIDE SERPL-SCNC: 106 MMOL/L (ref 95–110)
CO2 SERPL-SCNC: 24 MMOL/L (ref 23–29)
CREAT SERPL-MCNC: 1.3 MG/DL (ref 0.5–1.4)
DIFFERENTIAL METHOD BLD: NORMAL
EOSINOPHIL # BLD AUTO: 0.1 K/UL (ref 0–0.5)
EOSINOPHIL NFR BLD: 1.5 % (ref 0–8)
ERYTHROCYTE [DISTWIDTH] IN BLOOD BY AUTOMATED COUNT: 13.9 % (ref 11.5–14.5)
EST. GFR  (NO RACE VARIABLE): 59.8 ML/MIN/1.73 M^2
GLUCOSE SERPL-MCNC: 94 MG/DL (ref 70–110)
HCT VFR BLD AUTO: 43.9 % (ref 40–54)
HGB BLD-MCNC: 14.7 G/DL (ref 14–18)
IMM GRANULOCYTES # BLD AUTO: 0.01 K/UL (ref 0–0.04)
IMM GRANULOCYTES NFR BLD AUTO: 0.1 % (ref 0–0.5)
LYMPHOCYTES # BLD AUTO: 1.9 K/UL (ref 1–4.8)
LYMPHOCYTES NFR BLD: 28.5 % (ref 18–48)
MCH RBC QN AUTO: 27.6 PG (ref 27–31)
MCHC RBC AUTO-ENTMCNC: 33.5 G/DL (ref 32–36)
MCV RBC AUTO: 83 FL (ref 82–98)
MONOCYTES # BLD AUTO: 0.7 K/UL (ref 0.3–1)
MONOCYTES NFR BLD: 10.7 % (ref 4–15)
NEUTROPHILS # BLD AUTO: 4 K/UL (ref 1.8–7.7)
NEUTROPHILS NFR BLD: 58.6 % (ref 38–73)
NRBC BLD-RTO: 0 /100 WBC
PLATELET # BLD AUTO: 202 K/UL (ref 150–450)
PMV BLD AUTO: 11.5 FL (ref 9.2–12.9)
POTASSIUM SERPL-SCNC: 4 MMOL/L (ref 3.5–5.1)
PROT SERPL-MCNC: 7.3 G/DL (ref 6–8.4)
RBC # BLD AUTO: 5.32 M/UL (ref 4.6–6.2)
SODIUM SERPL-SCNC: 139 MMOL/L (ref 136–145)
TSH SERPL DL<=0.005 MIU/L-ACNC: 1.34 UIU/ML (ref 0.4–4)
WBC # BLD AUTO: 6.8 K/UL (ref 3.9–12.7)

## 2024-05-16 PROCEDURE — 3044F HG A1C LEVEL LT 7.0%: CPT | Mod: HCNC,CPTII,S$GLB, | Performed by: INTERNAL MEDICINE

## 2024-05-16 PROCEDURE — 3288F FALL RISK ASSESSMENT DOCD: CPT | Mod: HCNC,CPTII,S$GLB, | Performed by: INTERNAL MEDICINE

## 2024-05-16 PROCEDURE — 84443 ASSAY THYROID STIM HORMONE: CPT | Mod: HCNC | Performed by: INTERNAL MEDICINE

## 2024-05-16 PROCEDURE — 99214 OFFICE O/P EST MOD 30 MIN: CPT | Mod: HCNC,S$GLB,, | Performed by: INTERNAL MEDICINE

## 2024-05-16 PROCEDURE — 1159F MED LIST DOCD IN RCRD: CPT | Mod: HCNC,CPTII,S$GLB, | Performed by: INTERNAL MEDICINE

## 2024-05-16 PROCEDURE — 80053 COMPREHEN METABOLIC PANEL: CPT | Mod: HCNC | Performed by: INTERNAL MEDICINE

## 2024-05-16 PROCEDURE — 1126F AMNT PAIN NOTED NONE PRSNT: CPT | Mod: HCNC,CPTII,S$GLB, | Performed by: INTERNAL MEDICINE

## 2024-05-16 PROCEDURE — 3074F SYST BP LT 130 MM HG: CPT | Mod: HCNC,CPTII,S$GLB, | Performed by: INTERNAL MEDICINE

## 2024-05-16 PROCEDURE — 3008F BODY MASS INDEX DOCD: CPT | Mod: HCNC,CPTII,S$GLB, | Performed by: INTERNAL MEDICINE

## 2024-05-16 PROCEDURE — 36415 COLL VENOUS BLD VENIPUNCTURE: CPT | Mod: HCNC | Performed by: INTERNAL MEDICINE

## 2024-05-16 PROCEDURE — 3078F DIAST BP <80 MM HG: CPT | Mod: HCNC,CPTII,S$GLB, | Performed by: INTERNAL MEDICINE

## 2024-05-16 PROCEDURE — 99999 PR PBB SHADOW E&M-EST. PATIENT-LVL IV: CPT | Mod: PBBFAC,HCNC,, | Performed by: INTERNAL MEDICINE

## 2024-05-16 PROCEDURE — 1101F PT FALLS ASSESS-DOCD LE1/YR: CPT | Mod: HCNC,CPTII,S$GLB, | Performed by: INTERNAL MEDICINE

## 2024-05-16 PROCEDURE — 85025 COMPLETE CBC W/AUTO DIFF WBC: CPT | Mod: HCNC | Performed by: INTERNAL MEDICINE

## 2024-05-17 PROBLEM — G47.37 CENTRAL SLEEP APNEA DUE TO MEDICAL CONDITION: Status: ACTIVE | Noted: 2024-05-17

## 2024-05-17 NOTE — PROGRESS NOTES
PATIENT: Ascencion Owens  MRN: [id number]  DATE: 5/16/2024      Subjective:     Chief complaint:  Chief Complaint   Patient presents with    Deep Vein Thrombosis    Follow-up       HEME History:  Initial heme clinic 2/7/23  Mr. Owens is a 67 y.o. male who presents for evaluation and management of recent acute DVT. 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 He developed acute onset of left leg pain and swelling and presented to the ED where ultrasound was performed and showed thrombus in the popliteal, peroneal, and posterior tibial veins. He was started on Eliquis and states that the swelling has decreased since starting that but has not disappeared entirely. His last colonoscopy was in 2015 and a 10-year interval screening exam was recommended so he is up to date on that; he is a nonsmoker; he has had normal PSA testing in recent past. He has no constitutional or B symptoms. He notes that he has been told that he has thick blood on multiple occasions when having blood drawn. He denies family history of blood clotting issues. He had labs done when he had clots in 2011 showing normal PT gene and normal FV.              Interval History: Mr. Owens returns for follow up. He was recently switched from eliquis to xarelto due to insurance/cost issues. He notes since he started eliquis he has been feeling more fatigued. He wasn't sure if that might be related to eliquis. Upon further history taking he admits he snores loud enough that his spouse has to go to another room sometimes. Coupled with the fatigue, I suggested that this sounds like he should be evaluated for sleep apnea. He accepts sleep medicine referral.       Review of Systems   A comprehensive review of systems was performed; pertinent positives and negatives are noted in the HPI.        Objective:      Vitals:   Vitals:     "05/16/24 1456   BP: (!) 119/57   BP Location: Right arm   Patient Position: Sitting   BP Method: Medium (Automatic)   Pulse: 60   Resp: 16   SpO2: 95%   Weight: 100.3 kg (221 lb 1.9 oz)   Height: 5' 9" (1.753 m)     BMI: Body mass index is 32.65 kg/m².      Physical Exam:   Physical Exam  Vitals and nursing note reviewed.   Constitutional:       General: He is not in acute distress.     Appearance: Normal appearance. He is normal weight. He is not toxic-appearing.   HENT:      Head: Normocephalic and atraumatic.   Eyes:      General: No scleral icterus.     Conjunctiva/sclera: Conjunctivae normal.   Cardiovascular:      Rate and Rhythm: Normal rate.   Pulmonary:      Effort: Pulmonary effort is normal. No respiratory distress.   Abdominal:      General: There is no distension.   Musculoskeletal:         General: No deformity.      Cervical back: Neck supple.   Skin:     Coloration: Skin is not jaundiced.      Findings: No erythema.   Neurological:      General: No focal deficit present.      Mental Status: He is alert and oriented to person, place, and time. Mental status is at baseline.   Psychiatric:         Mood and Affect: Mood normal.         Behavior: Behavior normal.         Thought Content: Thought content normal.           Laboratory Data:  WBC   Date Value Ref Range Status   05/16/2024 6.80 3.90 - 12.70 K/uL Final     Hemoglobin   Date Value Ref Range Status   05/16/2024 14.7 14.0 - 18.0 g/dL Final     Hematocrit   Date Value Ref Range Status   05/16/2024 43.9 40.0 - 54.0 % Final     Platelets   Date Value Ref Range Status   05/16/2024 202 150 - 450 K/uL Final     Gran # (ANC)   Date Value Ref Range Status   05/16/2024 4.0 1.8 - 7.7 K/uL Final     Gran %   Date Value Ref Range Status   05/16/2024 58.6 38.0 - 73.0 % Final       Chemistry        Component Value Date/Time     05/16/2024 1536    K 4.0 05/16/2024 1536     05/16/2024 1536    CO2 24 05/16/2024 1536    BUN 11 05/16/2024 1536    " CREATININE 1.3 05/16/2024 1536    GLU 94 05/16/2024 1536        Component Value Date/Time    CALCIUM 9.7 05/16/2024 1536    ALKPHOS 58 05/16/2024 1536    AST 24 05/16/2024 1536    ALT 16 05/16/2024 1536    BILITOT 0.7 05/16/2024 1536    ESTGFRAFRICA >60.0 06/07/2022 0912    EGFRNONAA >60.0 06/07/2022 0912              Assessment/Plan:     1. Acute deep vein thrombosis (DVT) of popliteal vein of left lower extremity    2. Obstructive sleep apnea syndrome    3. Fatigue, unspecified type    Tolerating Xarelto fine. Continue prophylactic dose indefinitely.   Sleep medicine referral.       Med and Orders:  Orders Placed This Encounter    CBC W/ AUTO DIFFERENTIAL    CMP    TSH    Ambulatory referral/consult to Sleep Disorders       Follow Up:  Follow up in about 3 months (around 8/16/2024).      Above care plan was discussed with patient and all questions were addressed to their expressed satisfaction.       Shahbaz Sexton MD, FACP  Hematology & Medical Oncology  [facility]

## 2024-06-17 PROBLEM — Z00.00 ANNUAL PHYSICAL EXAM: Status: RESOLVED | Noted: 2024-03-14 | Resolved: 2024-06-17

## 2024-06-25 ENCOUNTER — PATIENT MESSAGE (OUTPATIENT)
Dept: FAMILY MEDICINE | Facility: CLINIC | Age: 69
End: 2024-06-25
Payer: MEDICARE

## 2024-06-25 RX ORDER — MELOXICAM 15 MG/1
15 TABLET ORAL DAILY PRN
Qty: 30 TABLET | Refills: 11 | Status: SHIPPED | OUTPATIENT
Start: 2024-06-25 | End: 2025-06-25

## 2024-06-25 RX ORDER — MELOXICAM 15 MG/1
TABLET ORAL
COMMUNITY
Start: 2024-06-25 | End: 2024-06-25 | Stop reason: SDUPTHER

## 2024-06-25 NOTE — TELEPHONE ENCOUNTER
No care due was identified.  Health [facility] Embedded Care Due Messages. Reference number: [id number].   6/25/2024 1:39:15 PM CDT

## 2024-07-13 ENCOUNTER — PATIENT MESSAGE (OUTPATIENT)
Dept: ADMINISTRATIVE | Facility: CLINIC | Age: 69
End: 2024-07-13
Payer: MEDICARE

## 2024-07-15 ENCOUNTER — TELEPHONE (OUTPATIENT)
Dept: ADMINISTRATIVE | Facility: CLINIC | Age: 69
End: 2024-07-15
Payer: MEDICARE

## 2024-07-16 ENCOUNTER — OFFICE VISIT (OUTPATIENT)
Dept: INTERNAL MEDICINE | Facility: CLINIC | Age: 69
End: 2024-07-16
Payer: MEDICARE

## 2024-07-16 VITALS
BODY MASS INDEX: 32.75 KG/M2 | WEIGHT: 221.13 LBS | HEART RATE: 63 BPM | SYSTOLIC BLOOD PRESSURE: 120 MMHG | DIASTOLIC BLOOD PRESSURE: 58 MMHG | HEIGHT: 69 IN

## 2024-07-16 DIAGNOSIS — I10 ESSENTIAL HYPERTENSION: ICD-10-CM

## 2024-07-16 DIAGNOSIS — Z00.00 ENCOUNTER FOR PREVENTIVE HEALTH EXAMINATION: Primary | ICD-10-CM

## 2024-07-16 DIAGNOSIS — I82.432 ACUTE EMBOLISM AND THROMBOSIS OF LEFT POPLITEAL VEIN: ICD-10-CM

## 2024-07-16 DIAGNOSIS — E78.5 HYPERLIPIDEMIA, ACQUIRED: ICD-10-CM

## 2024-07-16 DIAGNOSIS — Z86.711 HISTORY OF PULMONARY EMBOLISM: ICD-10-CM

## 2024-07-16 DIAGNOSIS — E66.09 CLASS 1 OBESITY DUE TO EXCESS CALORIES WITH SERIOUS COMORBIDITY AND BODY MASS INDEX (BMI) OF 32.0 TO 32.9 IN ADULT: ICD-10-CM

## 2024-07-16 PROCEDURE — G0439 PPPS, SUBSEQ VISIT: HCPCS | Mod: HCNC,95,, | Performed by: NURSE PRACTITIONER

## 2024-07-16 PROCEDURE — 3044F HG A1C LEVEL LT 7.0%: CPT | Mod: HCNC,CPTII,95, | Performed by: NURSE PRACTITIONER

## 2024-07-16 PROCEDURE — 1160F RVW MEDS BY RX/DR IN RCRD: CPT | Mod: HCNC,CPTII,95, | Performed by: NURSE PRACTITIONER

## 2024-07-16 PROCEDURE — 1170F FXNL STATUS ASSESSED: CPT | Mod: HCNC,CPTII,95, | Performed by: NURSE PRACTITIONER

## 2024-07-16 PROCEDURE — 1126F AMNT PAIN NOTED NONE PRSNT: CPT | Mod: HCNC,CPTII,95, | Performed by: NURSE PRACTITIONER

## 2024-07-16 PROCEDURE — 3078F DIAST BP <80 MM HG: CPT | Mod: HCNC,CPTII,95, | Performed by: NURSE PRACTITIONER

## 2024-07-16 PROCEDURE — 1101F PT FALLS ASSESS-DOCD LE1/YR: CPT | Mod: HCNC,CPTII,95, | Performed by: NURSE PRACTITIONER

## 2024-07-16 PROCEDURE — 1158F ADVNC CARE PLAN TLK DOCD: CPT | Mod: HCNC,CPTII,95, | Performed by: NURSE PRACTITIONER

## 2024-07-16 PROCEDURE — 3074F SYST BP LT 130 MM HG: CPT | Mod: HCNC,CPTII,95, | Performed by: NURSE PRACTITIONER

## 2024-07-16 PROCEDURE — 1159F MED LIST DOCD IN RCRD: CPT | Mod: HCNC,CPTII,95, | Performed by: NURSE PRACTITIONER

## 2024-07-16 PROCEDURE — 3288F FALL RISK ASSESSMENT DOCD: CPT | Mod: HCNC,CPTII,95, | Performed by: NURSE PRACTITIONER

## 2024-07-16 RX ORDER — TAMSULOSIN HYDROCHLORIDE 0.4 MG/1
CAPSULE ORAL
COMMUNITY
Start: 2024-07-04

## 2024-07-16 RX ORDER — CHOLECALCIFEROL (VITAMIN D3) 25 MCG
5000 TABLET ORAL DAILY
COMMUNITY

## 2024-07-16 RX ORDER — MULTIVITAMIN
1 TABLET ORAL DAILY
COMMUNITY

## 2024-07-16 NOTE — PATIENT INSTRUCTIONS
Counseling and Referral of Other Preventative  (Italic type indicates deductible and co-insurance are waived)    Patient Name: Ascencion Owens  Today's Date: 7/16/2024    Health Maintenance       Date Due Completion Date    Abdominal Aortic Aneurysm Screening Never done ---    Influenza Vaccine (1) 09/01/2024 9/14/2023    COVID-19 Vaccine (10 - 2023-24 season) 10/18/2024 6/18/2024    PROSTATE-SPECIFIC ANTIGEN 03/19/2025 3/19/2024    Hemoglobin A1c (Prediabetes) 03/19/2025 3/19/2024    Colorectal Cancer Screening 06/10/2025 6/10/2015    Lipid Panel 03/19/2029 3/19/2024    TETANUS VACCINE 10/02/2029 10/2/2019        No orders of the defined types were placed in this encounter.      The following information is provided to all patients.  This information is to help you find resources for any of the problems found today that may be affecting your health:                  Living healthy guide: www.[facility].[place].[facility]      Understanding Diabetes: www.diabetes.org      Eating healthy: www.cdc.gov/healthyweight      CDC home safety checklist: www.cdc.gov/steadi/patient.html      Agency on Aging: www.goea.[place].gov      Alcoholics anonymous (AA): www.aa.org      Physical Activity: www.griffin.nih.gov/nn5sqqj      Tobacco use: www.quitwithusla.org

## 2024-07-16 NOTE — PROGRESS NOTES
"The patient location is: [place]  The chief complaint leading to consultation is:  Medicare AWV    Visit type: audiovisual    Face to Face time with patient:  30 min   45  minutes of total time spent on the encounter, which includes face to face time and non-face to face time preparing to see the patient (eg, review of tests), Obtaining and/or reviewing separately obtained history, Documenting clinical information in the electronic or other health record, Independently interpreting results (not separately reported) and communicating results to the patient/family/caregiver, or Care coordination (not separately reported).         Each patient to whom he or she provides medical services by telemedicine is:  (1) informed of the relationship between the physician and patient and the respective role of any other health care provider with respect to management of the patient; and (2) notified that he or she may decline to receive medical services by telemedicine and may withdraw from such care at any time.    Notes:       Ascencion Owens presented for a  Medicare AWV and comprehensive Health Risk Assessment today. The following components were reviewed and updated:    Medical history  Family History  Social history  Allergies and Current Medications  Health Risk Assessment  Health Maintenance  Care Team         ** See Completed Assessments for Annual Wellness Visit within the encounter summary.**         The following assessments were completed:  Living Situation  CAGE  Depression Screening  Fall Risk Assessment (MACH 10)  Hearing Assessment(HHI)  Cognitive Function Screening  Nutrition Screening  ADL Screening  PAQ Screening    Opioid documentation:      Patient does not have a current opioid prescription.        Vitals:    07/16/24 0914   BP: (!) 120/58   Pulse: 63   Weight: 100.3 kg (221 lb 1.9 oz)   Height: 5' 9" (1.753 m)     Body mass index is 32.65 kg/m².  Physical Exam  Constitutional:       General: He is not in " acute distress.     Appearance: Normal appearance. He is not ill-appearing, toxic-appearing or diaphoretic.   Pulmonary:      Effort: Pulmonary effort is normal.   Neurological:      Mental Status: He is alert and oriented to person, place, and time.   Psychiatric:         Mood and Affect: Mood normal.         Behavior: Behavior normal.               Diagnoses and health risks identified today and associated recommendations/orders:    1. Encounter for preventive health examination  Screenings performed, as noted above.  Personal preventative testing needs reviewed.      2. Essential hypertension  Treated with amlodipine, stable, cont tx    3. Hyperlipidemia, acquired  Treated with atorvastatin, stable, cont tx    4. History of pulmonary embolism  Treated with Xeralto, stable, cont tx    5. Class 1 obesity due to excess calories with serious comorbidity and body mass index (BMI) of 32.0 to 32.9 in adult  Monitored, stable, going to the gym now, heart healthy diet    6. Acute embolism and thrombosis of left popliteal vein  Treated with Xeralto, stable, cont tx    Declined the AAA US at this time due to cost, he will discuss this with his pcp    Provided Ascencion with a 5-10 year written screening schedule and personal prevention plan. Recommendations were developed using the USPSTF age appropriate recommendations. Education, counseling, and referrals were provided as needed. After Visit Summary printed and given to patient which includes a list of additional screenings\tests needed.    No follow-ups on file.    Perry Harris NP  I offered to discuss advanced care planning, including how to pick a person who would make decisions for you if you were unable to make them for yourself, called a health care power of , and what kind of decisions you might make such as use of life sustaining treatments such as ventilators and tube feeding when faced with a life limiting illness recorded on a living will that they will  need to know. (How you want to be cared for as you near the end of your natural life)     X Patient is interested in learning more about how to make advanced directives.  I provided them paperwork and offered to discuss this with them.

## 2024-07-22 ENCOUNTER — OFFICE VISIT (OUTPATIENT)
Dept: SLEEP MEDICINE | Facility: CLINIC | Age: 69
End: 2024-07-22
Payer: MEDICARE

## 2024-07-22 DIAGNOSIS — G47.10 HYPERSOMNOLENCE: Primary | ICD-10-CM

## 2024-07-22 DIAGNOSIS — G47.30 SLEEP-DISORDERED BREATHING: ICD-10-CM

## 2024-07-22 DIAGNOSIS — F51.09 OTHER INSOMNIA NOT DUE TO A SUBSTANCE OR KNOWN PHYSIOLOGICAL CONDITION: ICD-10-CM

## 2024-07-22 DIAGNOSIS — R35.1 NOCTURIA: ICD-10-CM

## 2024-07-22 DIAGNOSIS — R06.83 SNORING: ICD-10-CM

## 2024-07-22 DIAGNOSIS — R29.818 SUSPECTED SLEEP APNEA: ICD-10-CM

## 2024-07-22 PROCEDURE — 99204 OFFICE O/P NEW MOD 45 MIN: CPT | Mod: HCNC,95,, | Performed by: PHYSICIAN ASSISTANT

## 2024-07-22 PROCEDURE — 1159F MED LIST DOCD IN RCRD: CPT | Mod: HCNC,CPTII,95, | Performed by: PHYSICIAN ASSISTANT

## 2024-07-22 PROCEDURE — 1160F RVW MEDS BY RX/DR IN RCRD: CPT | Mod: HCNC,CPTII,95, | Performed by: PHYSICIAN ASSISTANT

## 2024-07-22 PROCEDURE — 3044F HG A1C LEVEL LT 7.0%: CPT | Mod: HCNC,CPTII,95, | Performed by: PHYSICIAN ASSISTANT

## 2024-07-22 NOTE — PROGRESS NOTES
The chief complaint leading to consultation is: snoring     Visit type: audiovisual     The patient location is: [place]     24 minutes of total time spent on the encounter, which includes face to face time and non-face to face time preparing to see the patient (eg, review of tests), Obtaining and/or reviewing separately obtained history, Documenting clinical information in the electronic or other health record, Independently interpreting results (not separately reported) and communicating results to the patient/family/caregiver, or Care coordination (not separately reported).      Each patient to whom he or she provides medical services by telemedicine is:  (1) informed of the relationship between the physician and patient and the respective role of any other health care provider with respect to management of the patient; and (2) notified that he or she may decline to receive medical services by telemedicine and may withdraw from such care at any time.        Referred by Shahbaz Sexton MD     NEW PATIENT VISIT    Ascencion Owens  is a pleasant 68 y.o. male  with PMH significant for HTN, HLD, hx PE, hx DVT, cluster headaches, gout, BMI 32+ who presents for sleep evaluation following referral from Heme/Onc      C/o loud snoring (worse in certain positions and after alcohol intake), rare gasping arousals, poor disrupted and un-refreshing sleep, and excessive daytime sleepiness and figure. Denies significant drowsiness when driving or hx of falling asleep behind the wheel. Denies walking/talking. Denies dream enactment behavior. Denies sx of RLS. States his Heme/Onc recommended evaluation for  JOSEPH, which is why he presents today    SLEEP SCHEDULE   Environment    Bed Time 10-11PM   Sleep Latency Within 30mins   Arousals 2-3   Nocturia 2-3 (6-7 x nightly prior to starting flomax)   Back to sleep Within 30mins or less   Wake time 5AM (lounges in bed until about 8AM)   Naps Naps when able in the afternoons    Work          Past Medical History:   Diagnosis Date    Acute bronchitis     Arthritis     CKD (chronic kidney disease)     Cluster headache     Dyslipidemia     Essential hypertension, benign     History of pulmonary embolism 11/09/2012    Hypercholesteremia     Obesity      Patient Active Problem List   Diagnosis    Essential hypertension    Hyperlipidemia, acquired    History of pulmonary embolism    Obesity    History of cluster headache    Body mass index 33.0-33.9, adult    Acute idiopathic gout involving toe of left foot    Pinguecula of both eyes    Psychophysiological insomnia    Nocturia    Central sleep apnea due to medical condition    Acute embolism and thrombosis of left popliteal vein       Current Outpatient Medications:     amLODIPine (NORVASC) 10 MG tablet, TAKE 1 TABLET EVERY DAY, Disp: 90 tablet, Rfl: 3    atorvastatin (LIPITOR) 20 MG tablet, Take 1 tablet (20 mg total) by mouth once daily., Disp: 90 tablet, Rfl: 3    meloxicam (MOBIC) 15 MG tablet, Take 1 tablet (15 mg total) by mouth daily as needed for Pain., Disp: 30 tablet, Rfl: 11    multivitamin (ONE DAILY MULTIVITAMIN) per tablet, Take 1 tablet by mouth once daily., Disp: , Rfl:     rivaroxaban (XARELTO) 20 mg Tab, Take 1 tablet (20 mg total) by mouth daily with dinner or evening meal., Disp: 30 tablet, Rfl: 11    tamsulosin (FLOMAX) 0.4 mg Cap, , Disp: , Rfl:     tiZANidine (ZANAFLEX) 4 MG tablet, TAKE 1 TO 2 TABLETS BY MOUTH NIGHTLY AS NEEDED FOR PAIN, Disp: 180 tablet, Rfl: 2    vitamin D (VITAMIN D3) 1000 units Tab, Take 5,000 Units by mouth once daily., Disp: , Rfl:      There were no vitals filed for this visit.    Physical Exam:    GEN:   Well-appearing  Psych:  Appropriate affect, demonstrates insight  SKIN:  No rash on the face or bridge of the nose      LABS:   Lab Results   Component Value Date    HGB 14.7 05/16/2024    CO2 24 05/16/2024         RECORDS REVIEWED:    No previous sleep study    ASSESSMENT    [place] Sleepiness  Scale:  Sitting and reading:   3  Watching TV:    2  Passenger in a car x 1 hr:  1  Sitting quietly after lunch:  3  Lying down to rest in PM:  3  Sitting, inactive in public:  0  Sitting+ talking to someone:  0  Stopped in traffic:   0  Total    12/24    PROBLEM DESCRIPTION/ Sx on Presentation  STATUS   Sx JOSEPH   + loud snoring, + rare gasping arousals  denies witnessed apneas  + wakes feeling un-refreshed  New   Daytime Sx   + sleepiness when inactive   Naps most days in the afternoons  ESS 12/24 on intake  New   Insomnia   Trouble falling asleep: within 30mins  Arousals:         2-3 x nightly for nocturia   Hard to get back to sleep?: less than 30mins    Prior pertinent medications:  Current pertinent medications:   New   Nocturia   x 2-3 per sleep period  New   Other issues:       PLAN      -recommend sleep testing   -PSG ordered  -discussed trial therapy if JOSEPH present and the patient is open to a trial of CPAP therapy  -discussed JOSEPH and PAP with patient in detail, including possible complications of untreated JOSEPH like heart attack/stroke  -advised on strict driving precautions; advised never to drive drowsy     Advised on plan of care. Answered all patient questions. Patient verbalized understanding and voiced agreement with plan of care.     RTC if dx of JOSEPH made and CPAP ordered, will need follow up 31-90 days after receiving machine for compliance         The patient was given open opportunity to ask questions and/or express concerns about treatment plan. All questions/concerns were discussed.     Two patient identifiers used prior to evaluation.

## 2024-07-30 ENCOUNTER — TELEPHONE (OUTPATIENT)
Dept: FAMILY MEDICINE | Facility: CLINIC | Age: 69
End: 2024-07-30
Payer: MEDICARE

## 2024-07-30 NOTE — TELEPHONE ENCOUNTER
----- Message from Sarita Elena sent at 7/30/2024  8:49 AM CDT -----  Regarding: Refill Request  Type: RX Refill Request      Who Called: Self       Refill or New Rx:  Refill     RX Name and Strength:    Disp Refills Start End ARNIE  atorvastatin (LIPITOR) 20 MG tablet          Preferred Pharmacy with phone number:  [id number] [address]        Would the patient rather a call back or a response via [facility]? Call       Best Call Back Number: [phone]          Additional Information: Going out of town on Tuesday 8/6 and will not have enough to last him while he's out of town,

## 2024-08-08 ENCOUNTER — TELEPHONE (OUTPATIENT)
Dept: SLEEP MEDICINE | Facility: HOSPITAL | Age: 69
End: 2024-08-08
Payer: MEDICARE

## 2024-08-15 ENCOUNTER — PATIENT MESSAGE (OUTPATIENT)
Dept: FAMILY MEDICINE | Facility: CLINIC | Age: 69
End: 2024-08-15
Payer: MEDICARE

## 2024-08-20 ENCOUNTER — HOSPITAL ENCOUNTER (OUTPATIENT)
Dept: SLEEP MEDICINE | Facility: HOSPITAL | Age: 69
Discharge: HOME | End: 2024-08-20
Attending: PHYSICIAN ASSISTANT
Payer: MEDICARE

## 2024-08-20 DIAGNOSIS — R29.818 SUSPECTED SLEEP APNEA: ICD-10-CM

## 2024-08-20 DIAGNOSIS — R06.83 SNORING: ICD-10-CM

## 2024-08-20 DIAGNOSIS — F51.09 OTHER INSOMNIA NOT DUE TO A SUBSTANCE OR KNOWN PHYSIOLOGICAL CONDITION: ICD-10-CM

## 2024-08-20 DIAGNOSIS — R35.1 NOCTURIA: ICD-10-CM

## 2024-08-20 DIAGNOSIS — G47.30 SLEEP-DISORDERED BREATHING: ICD-10-CM

## 2024-08-20 DIAGNOSIS — G47.10 HYPERSOMNOLENCE: ICD-10-CM

## 2024-08-20 PROCEDURE — 95811 POLYSOM 6/>YRS CPAP 4/> PARM: CPT

## 2024-08-21 PROBLEM — G47.10 HYPERSOMNOLENCE: Status: ACTIVE | Noted: 2024-08-21

## 2024-08-21 NOTE — PROGRESS NOTES
End of the night summary      Type of study performed on (Ascencion Owens-)  Mikel     Patient education/cpap information prior to study/setup      Pt was informed, of emergency cord in bathroom and given spectra link phone#      EKG Appears to be- NSR  w PACs     Low spo2 -  81%%     Any difficulties: None    Cpap mask: full face arian hybrid mask Medium    Optimal:  11?     Pt reaction to CPAP: pt tried nasal pillows arian reports to much pressure discomfort through the nose, tech switch pt to full face arian hybrid pt reports comfortable    Tech summary Comments:    pt met criteria for split on cpap. soft to none snoring observed, pt report he is unable to sleep supine, pt prefer to sleep on his right side, pt has frequent events observed on his left side,  most of pts events on left side resolved,

## 2024-08-28 NOTE — PROCEDURES
"      Split-Night Report  [facility] [address], [place] [address]  Phone: [phone]  Fax: [phone]       Patient Name: Ascencion Owens Study Date: 8/20/2024   YOB: 1955 Patient MRN: [id number]   Age:  68 year Hospital #: [id number]   Sex: Male Referring Physician: Aman Kirby MD   Height: 5' 9" Recording Tech: Jeniffer-Janes Garzaen RPSGT   Weight: 221.0 lbs Scoring Tech: JENNIFER Saxena RRT RPSGT   BMI:  32.7 AASM  1B   Diagnostic AHI: 25.0    Interpreting Physician      RERA index: 2.2   Diagnostic Low O2 sat. 77.0%     Diagnostic RDI: 27.2       Sleep architecture: This was a split night study. During the diagnostic portion of the study, the patient fell asleep in 0.0 minutes. Sleep efficiency was 100.0%. Total sleep time (TST) during the diagnostic portion was 139.0 minutes. REM latency was 62.5 minutes. Sleep architecture in the diagnostic part was significantly disrupted due to underlying sleep apnea.    Respiratory: Moderate snoring was present. The overall AHI was 25.0 with an oxygen carlos of 77.0%.  The central sleep apnea index was -.    Motor movement / Parasomnia: There were no significant limb movements of sleep noted. The total limb movement index was - (- with arousal).     Cardiac: single lead EKG revealed normal sinus rhythm     PAP titration:    Mask:  full face arian hybrid mask Medium   CPAP = 10 cwp was partially effective in lateral position in stage N2 sleep  CPAP = 11 cwp was largely effective in lateral position in stage REM sleep    Oxygenation:  Hypoxemia was only observed in relation to obstructive events.    Impression:  -obstructive sleep apnea       Recommendations:  -initial auto-CPAP settings CPAP min = 10 cwp  and CPAP max =  14 cwp  -if the patient complains of sleep disruption, CPAP min should be adjusted to 11cwp or higher depending on pressure tolerance  -ramp = 10 cwp or higher to achieve sleep onset   -the patient has follow " up with Sleep Medicine        Bassam Marie MD  (This Sleep Study was interpreted by a Board Certified Sleep Specialist who conducted an epoch-by-epoch review of the entire raw data recording.)  (The indication for this sleep study was reviewed and deemed appropriate by AASM Practice Parameters or other reasons by a Board Certified Sleep Specialist.)      Study Overview    DIAGNOSTIC TREATMENT   First Lights Off: 09:57:49 PM First Lights Off: 12:46:52 AM   Last Lights On: 12:46:52 AM Last Lights On: 05:21:32 AM   Time in Bed: 169.1 Time in Bed: 274.7   Total Sleep Time: 139.0 Total Sleep Time: 187.0   Sleep Efficiency: 82.2% Sleep Efficiency: 68.1%   Sleep Period Time: 153.0 Sleep Period Time: 268.0   Sleep Maintenance Efficiency: 90.8% Sleep Maintenance Efficiency: 69.8%   Sleep Latency: 9.5 Sleep Latency: 8.5   REM Latency from Sleep Onset: 62.5 REM Latency from Sleep Onset: 125.5     DIAGNOSTIC TREATMENT    COUNT INDEX  COUNT INDEX   Awakenings: 9 3.9 Awakenings: 14 4.5   Arousals: 39 16.8 Arousals: 51 16.4   Apneas & Hypopneas: 58 25.0 Apneas & Hypopneas: 39 12.5   Limb Movements: - - Limb Movements: - -   Snores: 28 12.1 Snores: - -           Desaturations 61  Desaturations 41    Average Oxygen Saturation:  91.7%  Average Oxygen Saturation:  94.9%      Sleep Architecture                      DIAGNOSTIC TREATMENT ENTIRE NIGHT   Stages TIME (mins) % SLEEP TIME TIME (mins) % SLEEP TIME TIME (mins) % SLEEP TIME   WAKE 30.5  88.0  118.5    Stage N1 8.0 5.8% 19.5 10.4% 27.5 8.4%   Stage N2 103.5 74.5% 117.0 62.6% 220.5 67.6%   Stage N3 10.0 7.2% - 0.0% 10.0 3.1%   REM 17.5 12.6% 50.5 27.0% 68.0 20.9%         Arousal Summary     DIAGNOSTIC TREATMENT    NREM REM Total Sleep Time NREM REM Total Sleep Time   Apnea & Hypopnea Arousals 8 8 16 8 2 10   PLM Arousals - - - - - -   Isolated Limb Movement Arousals - - - - - -   Spontaneous Arousals 16 4 20 32 8 40   RERAs  5 - 5 4 - 4   Total 27 12 39 40 11 51   Arousal Index  13.3 41.1 16.8 17.6 13.1 16.4     Respiratory Summary    RESPIRATORY EVENTS BY SLEEP STAGE   DIAGNOSTIC TREATMENT    NREM REM TOTAL NREM REM TOTAL   Sleep Time (min) 121.5 17.5 139.0 136.5 50.5 187.0            Obstructive Apnea - 1 1 1 - 1   Mixed Apnea - - - - - -   Central Apnea - - - 2 - 2   Total Apneas - 1 1 3 - 3   Total Apnea Index - - 0.4 1.3 - 1.0   Total Hypopnea 36 72.0 21 26 10 11.9   Total Hypopnea Index 17.8 72.0   72.0 11.4 11.9 10   Apnea & Hypopnea Index 17.8 75.4 25.0 12.7   11.9 12.5   RERAs 5 5 5 4 - 4   RERA Index 2.5 - 2.2 1.8 - 1.3     RESPIRATORY EVENTS BY BODY POSITION   DIAGNOSTIC TREATMENT    SUPINE NON-SUPINE TOTAL SUPINE NON-SUPINE TOTAL   Sleep Time (min) - - 139.0 - 187.0 187.0            Obstructive Apnea - 1 1 - 1 1   Mixed Apnea - - - - - -   Central Apnea - - - - 2 2   Total Apneas - 1 1 - 3 3   Total Apnea Index - 0.4 0.4 - 1.0 1.0   Total Hypopneas - 57 57 - 36 36   Total Hypopnea Index - 24.6 24.6 - 11.6 11.6   AHI - 25.0 25.0 - 12.5 12.5   RERAs - 5 5 - 4 4   RERA Index - 2.2 2.2 - 1.3 1.3         Respiratory Event Durations     DIAGNOSTIC TREATMENT    Apnea Apnea    NREM REM NREM REM   Average (seconds) - 26.3 20.3 -   Maximum (seconds) - 26.3 23.9 -      DIAGNOSTIC TREATMENT    Hypopnea Hypopnea    NREM REM NREM REM   Average (seconds) 30.0 30.4 34.5 37.8   Maximum (seconds) 47.0 50.9 47.8 48.3     Oxygen Saturation Summary     DIAGNOSTIC TREATMENT    WAKE NREM REM WAKE NREM REM   Average OSat (%) 94.3% 91.3% 90.0% 96.2% 94.4% 94.2%   Minimum OSat (%) 78.0% 77.0% 80.0% 80.0% 89.0% 88.0%   Maximum OSat (%) 98.0% 98.0% 97.0% 99.0% 98.0% 98.0%     DIAGNOSTIC                            Oxygen Saturation Distribution    Range(%) Time in range (min) Time in range (%)    90.0 - 100.0 126.2 75.7%   80.0 - 90.0 39.4 23.6%   70.0 - 80.0 1.1 0.6%   60.0 - 70.0 - -   50.0 - 60.0 - -   0.0 - 50.0 - -              Time Spent Less than 88% OSat    Range(%) Time in range (min) Time in range  (%)   0.0 - 88.0 12.9 7.8%     # of Desaturations 61   Minimum Oxygen Saturation During Desaturation 77.0%      SUPINE LEFT RIGHT PRONE   Minimum OSat Associated with Longest Apnea - 80.0% - -   Minimum OSat Associated with Longest Hypopnea - 83.0% 87.0% -     TREATMENT                            Oxygen Saturation Distribution    Range(%) Time in range (min) Time in range (%)    90.0 - 100.0 268.0 98.7%   80.0 - 90.0 3.5 1.3%   70.0 - 80.0 0.0 0.0%   60.0 - 70.0 - -   50.0 - 60.0 - -   0.0 - 50.0 - -              Time Spent Less than 88% OSat    Range(%) Time in range (min) Time in range (%)   0.0 - 88.0 0.4 0.2%     # of Desaturations 41   Minimum Oxygen Saturation During Desaturation 89.0%      SUPINE LEFT RIGHT PRONE   Minimum OSat Associated with Longest Apnea - 94.0% - -   Minimum OSat Associated with Longest Hypopnea - 93.0% 92.0% -     Limb Movement Summary          DIAGNOSTIC TREATMENT    COUNT INDEX COUNT INDEX   Isolated Limb Movements - - - -   Periodic Limb Movements (PLMs) - - - -   Total Limb Movements - - - -     Cardiac Summary     DIAGNOSTIC TREATMENT    WAKE NREM REM TOTAL WAKE NREM REM TOTAL    Average Pulse Rate (BPM) 64.7 61.9 61.9 62.3 60.0 58.4 59.0 59.0   Minimum Pulse Rate (BPM) 54.0 53.0 53.0 53.0 50.0 51.0 51.0 50.0   Maximum Pulse Rate (BPM) 115.0 76.0 79.0 115.0 81.0 73.0 78.0 81.0       DIAGNOSTIC     Pulse Rate Distribution    Range(bpm) Time in range (min) Time in range (%)   0.0 - 40.0 - -   40.0 - 60.0 45.7 27.4%   60.0 - 80.0 120.3 72.2%   80.0 - 100.0 0.5 0.3%   100.0 - 120.0 0.1 0.1%   120.0 - 140.0 - -   140.0 - 200.0 - -       TREATMENT     Pulse Rate Distribution    Range(bpm) Time in range (min) Time in range (%)   0.0 - 40.0 - -   40.0 - 60.0 195.8 72.1%   60.0 - 80.0 75.7 27.9%   80.0 - 100.0 0.1 0.1%   100.0 - 120.0 - -   120.0 - 140.0 - -   140.0 - 200.0 - -               Titration Summary      PAP Device PAP Level O2 Level Time (min) Wake (min) NREM (min) REM (min) Sleep  Eff% OA# CA# MA# Hyp# Hyp # (w/Ar/Ds) AHI AHI   (Hyp w/Ar/Ds) RERA RDI Min OSat LM# LM Index AR# AR Index   - Off - 169.5 30.5 121.5 17.5 82.0% 1 - - 57 54 25.0 23.7 5 27.2 77.0 - - 39 16.8   CPAP 5 - 70.5 38.5 32.0 0.0 45.4% - - - 11 11 20.6 20.6 4 28.1 85.0 - - 11 20.6   CPAP 6 - 28.5 9.0 19.5 0.0 68.4% - 2 - 4 4 18.5 18.5 - 18.5 91.0 - - 7 21.5   CPAP 7 - 12.0 2.0 10.0 0.0 83.3% 1 - - 8 8 54.0 54.0 - 54.0 89.0 - - 10 60.0   CPAP 8 - 4.0 0.0 4.0 0.0 100.0% - - - 1 1 15.0 15.0 - 15.0 90.0 - - 1 15.0   CPAP 9 - 8.5 0.5 8.0 0.0 94.1% - - - 1 1 7.5 7.5 - 7.5 90.0 - - 2 15.0   CPAP 10 - 93.0 36.5 30.5 26.0 60.8% - - - 10 9 10.6 9.6 - 10.6 80.0 - - 16 17.0   CPAP 11 - 58.5 1.5 32.5 24.5 97.4% - - - 1 1 1.1 1.1 - 1.1 90.0 - - 4 4.2

## 2024-08-29 ENCOUNTER — PATIENT MESSAGE (OUTPATIENT)
Dept: SLEEP MEDICINE | Facility: CLINIC | Age: 69
End: 2024-08-29
Payer: MEDICARE

## 2024-08-29 DIAGNOSIS — G47.33 OSA (OBSTRUCTIVE SLEEP APNEA): Primary | ICD-10-CM

## 2024-09-17 ENCOUNTER — OFFICE VISIT (OUTPATIENT)
Dept: FAMILY MEDICINE | Facility: CLINIC | Age: 69
End: 2024-09-17
Payer: MEDICARE

## 2024-09-17 VITALS
OXYGEN SATURATION: 95 % | RESPIRATION RATE: 19 BRPM | HEART RATE: 84 BPM | WEIGHT: 220.69 LBS | BODY MASS INDEX: 32.69 KG/M2 | DIASTOLIC BLOOD PRESSURE: 84 MMHG | HEIGHT: 69 IN | SYSTOLIC BLOOD PRESSURE: 138 MMHG | TEMPERATURE: 98 F

## 2024-09-17 DIAGNOSIS — Z12.5 PROSTATE CANCER SCREENING: ICD-10-CM

## 2024-09-17 DIAGNOSIS — R79.9 ABNORMAL FINDING OF BLOOD CHEMISTRY, UNSPECIFIED: ICD-10-CM

## 2024-09-17 DIAGNOSIS — R35.1 NOCTURIA: Primary | ICD-10-CM

## 2024-09-17 DIAGNOSIS — G47.33 OSA (OBSTRUCTIVE SLEEP APNEA): ICD-10-CM

## 2024-09-17 DIAGNOSIS — Z00.00 ANNUAL PHYSICAL EXAM: ICD-10-CM

## 2024-09-17 DIAGNOSIS — Z23 NEEDS FLU SHOT: ICD-10-CM

## 2024-09-17 PROCEDURE — 1159F MED LIST DOCD IN RCRD: CPT | Mod: HCNC,CPTII,S$GLB, | Performed by: INTERNAL MEDICINE

## 2024-09-17 PROCEDURE — 3075F SYST BP GE 130 - 139MM HG: CPT | Mod: HCNC,CPTII,S$GLB, | Performed by: INTERNAL MEDICINE

## 2024-09-17 PROCEDURE — 1126F AMNT PAIN NOTED NONE PRSNT: CPT | Mod: HCNC,CPTII,S$GLB, | Performed by: INTERNAL MEDICINE

## 2024-09-17 PROCEDURE — 99999 PR PBB SHADOW E&M-EST. PATIENT-LVL IV: CPT | Mod: PBBFAC,HCNC,, | Performed by: INTERNAL MEDICINE

## 2024-09-17 PROCEDURE — 3079F DIAST BP 80-89 MM HG: CPT | Mod: HCNC,CPTII,S$GLB, | Performed by: INTERNAL MEDICINE

## 2024-09-17 PROCEDURE — 99214 OFFICE O/P EST MOD 30 MIN: CPT | Mod: HCNC,S$GLB,, | Performed by: INTERNAL MEDICINE

## 2024-09-17 PROCEDURE — G0008 ADMIN INFLUENZA VIRUS VAC: HCPCS | Mod: HCNC,S$GLB,, | Performed by: INTERNAL MEDICINE

## 2024-09-17 PROCEDURE — 1160F RVW MEDS BY RX/DR IN RCRD: CPT | Mod: HCNC,CPTII,S$GLB, | Performed by: INTERNAL MEDICINE

## 2024-09-17 PROCEDURE — 3008F BODY MASS INDEX DOCD: CPT | Mod: HCNC,CPTII,S$GLB, | Performed by: INTERNAL MEDICINE

## 2024-09-17 PROCEDURE — 3044F HG A1C LEVEL LT 7.0%: CPT | Mod: HCNC,CPTII,S$GLB, | Performed by: INTERNAL MEDICINE

## 2024-09-17 PROCEDURE — 1101F PT FALLS ASSESS-DOCD LE1/YR: CPT | Mod: HCNC,CPTII,S$GLB, | Performed by: INTERNAL MEDICINE

## 2024-09-17 PROCEDURE — 3288F FALL RISK ASSESSMENT DOCD: CPT | Mod: HCNC,CPTII,S$GLB, | Performed by: INTERNAL MEDICINE

## 2024-09-17 PROCEDURE — 90653 IIV ADJUVANT VACCINE IM: CPT | Mod: HCNC,S$GLB,, | Performed by: INTERNAL MEDICINE

## 2024-09-17 RX ORDER — TAMSULOSIN HYDROCHLORIDE 0.4 MG/1
0.4 CAPSULE ORAL DAILY
Qty: 90 CAPSULE | Refills: 3 | Status: SHIPPED | OUTPATIENT
Start: 2024-09-17 | End: 2025-09-17

## 2024-09-17 NOTE — ASSESSMENT & PLAN NOTE
Chronic, unresolved    - continue Flomax in the morning   - counseled on lifestyle changes to help with nocturia

## 2024-09-17 NOTE — PROGRESS NOTES
Pt tolerated flu vaccine to left deltoid without difficulty; no adverse reaction noted; VIS given  Lot#[id number]

## 2024-09-17 NOTE — PROGRESS NOTES
Chief Complaint: Follow-up      Ascencion Owens  is a 68 y.o. year old patient who presents today for follow up     Patient reports no issues. Had worsening nocturia when he stopped flomax.      Past Medical History:   Diagnosis Date    Acute bronchitis     Arthritis     CKD (chronic kidney disease)     Cluster headache     Dyslipidemia     Essential hypertension, benign     History of pulmonary embolism 2012    Hypercholesteremia     Obesity        Past Surgical History:   Procedure Laterality Date    APPENDECTOMY      COLONOSCOPY  2004    FRACTURE SURGERY  Ankle [employer] football    lt ankle surgery  1973    VASECTOMY  Around         Family History   Problem Relation Name Age of Onset    Diabetes Mother Natali Owens     Hypertension Mother Natali Owens     Heart disease Mother Natali Owens     Arthritis Mother Natali Owens     Arthritis Father Vinicius Owens Sr     Arthritis Sister Natali Guerra         Social History     Socioeconomic History    Marital status:    Tobacco Use    Smoking status: Former     Types: Cigars     Start date:      Quit date:      Years since quittin.7    Smokeless tobacco: Never    Tobacco comments:     occ   Substance and Sexual Activity    Alcohol use: Yes     Alcohol/week: 1.0 standard drink of alcohol     Types: 1 Shots of liquor per week     Comment: occasionally    Drug use: No    Sexual activity: Yes     Partners: Female     Social Determinants of Health     Financial Resource Strain: Low Risk  (7/15/2024)    Overall Financial Resource Strain (CARDIA)     Difficulty of Paying Living Expenses: Not very hard   Food Insecurity: No Food Insecurity (7/15/2024)    Hunger Vital Sign     Worried About Running Out of Food in the Last Year: Never true     Ran Out of Food in the Last Year: Never true   Transportation Needs: No Transportation Needs (2022)    PRAPARE - Transportation     Lack of Transportation (Medical): No     Lack of  Transportation (Non-Medical): No   Physical Activity: Sufficiently Active (7/15/2024)    Exercise Vital Sign     Days of Exercise per Week: 3 days     Minutes of Exercise per Session: 80 min   Stress: Stress Concern Present (7/15/2024)    [employer] - Occupational Stress Questionnaire     Feeling of Stress : To some extent   Housing Stability: Low Risk  (6/8/2022)    Housing Stability Vital Sign     Unable to Pay for Housing in the Last Year: No     Number of Places Lived in the Last Year: 1     Unstable Housing in the Last Year: No         Current Outpatient Medications:     amLODIPine (NORVASC) 10 MG tablet, TAKE 1 TABLET EVERY DAY, Disp: 90 tablet, Rfl: 3    atorvastatin (LIPITOR) 20 MG tablet, Take 1 tablet (20 mg total) by mouth once daily., Disp: 90 tablet, Rfl: 3    meloxicam (MOBIC) 15 MG tablet, Take 1 tablet (15 mg total) by mouth daily as needed for Pain., Disp: 30 tablet, Rfl: 11    multivitamin (ONE DAILY MULTIVITAMIN) per tablet, Take 1 tablet by mouth once daily., Disp: , Rfl:     rivaroxaban (XARELTO) 20 mg Tab, Take 1 tablet (20 mg total) by mouth daily with dinner or evening meal., Disp: 30 tablet, Rfl: 11    tiZANidine (ZANAFLEX) 4 MG tablet, TAKE 1 TO 2 TABLETS BY MOUTH NIGHTLY AS NEEDED FOR PAIN, Disp: 180 tablet, Rfl: 2    vitamin D (VITAMIN D3) 1000 units Tab, Take 5,000 Units by mouth once daily., Disp: , Rfl:     tamsulosin (FLOMAX) 0.4 mg Cap, Take 1 capsule (0.4 mg total) by mouth once daily., Disp: 90 capsule, Rfl: 3  No current facility-administered medications for this visit.     Review of Systems   Constitutional:  Positive for malaise/fatigue.   Genitourinary:         Nocturia        Objective:      Vitals:    09/17/24 1146   BP: 138/84   Pulse: 84   Resp: 19   Temp: 98.3 °F (36.8 °C)       Physical Exam  Constitutional:       Appearance: Normal appearance.   HENT:      Head: Normocephalic and atraumatic.   Cardiovascular:      Rate and Rhythm: Normal  rate.   Musculoskeletal:         General: Normal range of motion.   Skin:     General: Skin is warm and dry.   Neurological:      General: No focal deficit present.      Mental Status: He is alert and oriented to person, place, and time.          Assessment:       1. Nocturia    2. JOSEPH (obstructive sleep apnea)    3. Needs flu shot    4. Annual physical exam    5. Prostate cancer screening    6. Abnormal finding of blood chemistry, unspecified          Plan:   1. Nocturia  Assessment & Plan:  Chronic, unresolved    - continue Flomax in the morning   - counseled on lifestyle changes to help with nocturia     Orders:  -     tamsulosin (FLOMAX) 0.4 mg Cap; Take 1 capsule (0.4 mg total) by mouth once daily.  Dispense: 90 capsule; Refill: 3    2. JOSEPH (obstructive sleep apnea)  Assessment & Plan:  AHI 25 (8/20/24), patient waiting for CPAP   - advised pt to reach out to sleep medicine provider      3. Needs flu shot  -     Discontinue: influenza (Flulaval, Fluzone, Fluarix) 45 mcg/0.5 mL IM vaccine (> or = 6 mo) 0.5 mL  -     influenza (adjuvanted) (Fluad) 45 mcg/0.5 mL IM vaccine (> or = 66 yo) 0.5 mL    4. Annual physical exam  -     CBC Auto Differential; Future; Expected date: 03/20/2025  -     Comprehensive Metabolic Panel; Future; Expected date: 03/20/2025  -     Hemoglobin A1C; Future; Expected date: 03/20/2025  -     Lipid Panel; Future; Expected date: 03/20/2025    5. Prostate cancer screening  -     PSA, Screening; Future; Expected date: 03/20/2025    6. Abnormal finding of blood chemistry, unspecified  -     CBC Auto Differential; Future; Expected date: 03/20/2025  -     Comprehensive Metabolic Panel; Future; Expected date: 03/20/2025  -     Hemoglobin A1C; Future; Expected date: 03/20/2025  -     Lipid Panel; Future; Expected date: 03/20/2025         Follow up in about 6 months (around 3/20/2025) for annual.

## 2024-09-24 ENCOUNTER — NURSE TRIAGE (OUTPATIENT)
Dept: ADMINISTRATIVE | Facility: CLINIC | Age: 69
End: 2024-09-24
Payer: MEDICARE

## 2024-09-24 ENCOUNTER — PATIENT MESSAGE (OUTPATIENT)
Dept: SLEEP MEDICINE | Facility: CLINIC | Age: 69
End: 2024-09-24
Payer: MEDICARE

## 2024-09-24 NOTE — TELEPHONE ENCOUNTER
Pt inadvertently took 2 flomax. Took the first one around 4pm and the second around 5pm. Thinks he may feel a little lightheaded but not sure. Pt also concerned because he does take 2 BP meds and a blood thinner before bed and asking if he should take those still.    Dispo- call pcp now. Spoke with dr. Melara via secure chat. She reviewed chart and advised pt should only take half of his amlodipine tonight. Pt advised and VU. Advised pt to take it east tonight. Rest and be careful when walking and going from sitting to standing due to his lightheadedness. He VU.  Reason for Disposition   [1] DOUBLE DOSE (an extra dose or lesser amount) of prescription drug AND [2] any symptoms (e.g., dizziness, nausea, pain, sleepiness)    Additional Information   Negative: [1] Intentional drug overdose AND [2] suicidal thoughts or ideas   Negative: MORE THAN A DOUBLE DOSE of a prescription or over-the-counter (OTC) drug    Protocols used: Medication Question Call-A-AH

## 2024-09-25 ENCOUNTER — TELEPHONE (OUTPATIENT)
Dept: SLEEP MEDICINE | Facility: CLINIC | Age: 69
End: 2024-09-25
Payer: MEDICARE

## 2024-09-25 NOTE — TELEPHONE ENCOUNTER
Spoke with patient to schedule his follow up compliance visit with Kofi Newton. Patient stated that he already scheduled an appointment through [facility].

## 2024-09-25 NOTE — TELEPHONE ENCOUNTER
Patient stated he is feeling fine.  Stated he only took half of his blood pressure medication last night and slept like a baby.  Wanted to thank Dr. Hoskins for checking on him.  Please be advised.

## 2024-12-02 ENCOUNTER — TELEPHONE (OUTPATIENT)
Dept: FAMILY MEDICINE | Facility: CLINIC | Age: 69
End: 2024-12-02
Payer: MEDICARE

## 2024-12-03 ENCOUNTER — LAB VISIT (OUTPATIENT)
Dept: LAB | Facility: HOSPITAL | Age: 69
End: 2024-12-03
Payer: MEDICARE

## 2024-12-03 ENCOUNTER — OFFICE VISIT (OUTPATIENT)
Dept: FAMILY MEDICINE | Facility: CLINIC | Age: 69
End: 2024-12-03
Payer: MEDICARE

## 2024-12-03 VITALS
RESPIRATION RATE: 16 BRPM | HEART RATE: 67 BPM | OXYGEN SATURATION: 99 % | TEMPERATURE: 98 F | SYSTOLIC BLOOD PRESSURE: 128 MMHG | WEIGHT: 224 LBS | DIASTOLIC BLOOD PRESSURE: 72 MMHG | BODY MASS INDEX: 33.18 KG/M2 | HEIGHT: 69 IN

## 2024-12-03 DIAGNOSIS — M54.16 LUMBAR RADICULOPATHY, CHRONIC: Primary | ICD-10-CM

## 2024-12-03 DIAGNOSIS — M54.16 LUMBAR RADICULOPATHY, CHRONIC: ICD-10-CM

## 2024-12-03 DIAGNOSIS — I10 ESSENTIAL HYPERTENSION: ICD-10-CM

## 2024-12-03 DIAGNOSIS — E78.5 HYPERLIPIDEMIA, ACQUIRED: ICD-10-CM

## 2024-12-03 LAB
CREAT SERPL-MCNC: 1 MG/DL (ref 0.5–1.4)
EST. GFR  (NO RACE VARIABLE): >60 ML/MIN/1.73 M^2

## 2024-12-03 PROCEDURE — 1159F MED LIST DOCD IN RCRD: CPT | Mod: CPTII,S$GLB,,

## 2024-12-03 PROCEDURE — 3008F BODY MASS INDEX DOCD: CPT | Mod: CPTII,S$GLB,,

## 2024-12-03 PROCEDURE — 3074F SYST BP LT 130 MM HG: CPT | Mod: CPTII,S$GLB,,

## 2024-12-03 PROCEDURE — 99999 PR PBB SHADOW E&M-EST. PATIENT-LVL IV: CPT | Mod: PBBFAC,HCNC,,

## 2024-12-03 PROCEDURE — 1125F AMNT PAIN NOTED PAIN PRSNT: CPT | Mod: CPTII,S$GLB,,

## 2024-12-03 PROCEDURE — 82565 ASSAY OF CREATININE: CPT | Mod: HCNC

## 2024-12-03 PROCEDURE — 99214 OFFICE O/P EST MOD 30 MIN: CPT | Mod: S$GLB,,,

## 2024-12-03 PROCEDURE — 3044F HG A1C LEVEL LT 7.0%: CPT | Mod: CPTII,S$GLB,,

## 2024-12-03 PROCEDURE — 36415 COLL VENOUS BLD VENIPUNCTURE: CPT | Mod: HCNC,PO

## 2024-12-03 PROCEDURE — 3078F DIAST BP <80 MM HG: CPT | Mod: CPTII,S$GLB,,

## 2024-12-03 NOTE — PROGRESS NOTES
Family Medicine      Patient Name: Ascencion Owens  MRN: [id number]  : 1955    PCP: Brittnee Hoskins MD       HPI      Ascencion Owens is a 69 y.o. male with multiple medical diagnoses as listed in the medical history and problem list that presents for   Chief Complaint   Patient presents with    Back Pain     Pt states his back pain been 2024       Mr. Owens presents today with complaints of back pain that worsened after repeated bending and working out last week. He has a known back injury from > 40 years ago, but he has been managing his symptoms with Mobic, Tizanidine and a back brace. He felt like his symptoms were under control until this most recent episode. He now experiences tingling that radiates down his right leg. The Mobic and Tizanidine are still helping, but he is in increased pain.              History      Past Medical History:  Past Medical History:   Diagnosis Date    Acute bronchitis     Arthritis     CKD (chronic kidney disease)     Cluster headache     Dyslipidemia     Essential hypertension, benign     History of pulmonary embolism 2012    Hypercholesteremia     Obesity        Past Surgical History:  Past Surgical History:   Procedure Laterality Date    APPENDECTOMY      COLONOSCOPY  2004    FRACTURE SURGERY  [employer]kle [employer] football    lt ankle surgery  1973    VASECTOMY  Around        Social History:  Social History     Socioeconomic History    Marital status:    Tobacco Use    Smoking status: Former     Types: Cigars     Start date:      Quit date:      Years since quittin.9    Smokeless tobacco: Never    Tobacco comments:     occ   Substance and Sexual Activity    Alcohol use: Yes     Alcohol/week: 1.0 standard drink of alcohol     Types: 1 Shots of liquor per week     Comment: occasionally    Drug use: No    Sexual activity: Yes     Partners: Female     Social Drivers of Health     Financial Resource Strain: Low Risk   (7/15/2024)    Overall Financial Resource Strain (CARDIA)     Difficulty of Paying Living Expenses: Not very hard   Food Insecurity: No Food Insecurity (7/15/2024)    Hunger Vital Sign     Worried About Running Out of Food in the Last Year: Never true     Ran Out of Food in the Last Year: Never true   Transportation Needs: No Transportation Needs (6/8/2022)    PRAPARE - Transportation     Lack of Transportation (Medical): No     Lack of Transportation (Non-Medical): No   Physical Activity: Sufficiently Active (7/15/2024)    Exercise Vital Sign     Days of Exercise per Week: 3 days     Minutes of Exercise per Session: 80 min   Stress: Stress Concern Present (7/15/2024)    [employer] [place] [employer] - Occupational Stress Questionnaire     Feeling of Stress : To some extent   Housing Stability: Low Risk  (6/8/2022)    Housing Stability Vital Sign     Unable to Pay for Housing in the Last Year: No     Number of Places Lived in the Last Year: 1     Unstable Housing in the Last Year: No       Family History:  Family History   Problem Relation Name Age of Onset    Diabetes Mother Natali Owens     Hypertension Mother Natali Owens     Heart disease Mother Natali Owens     Arthritis Mother Natali Owens     Arthritis Father Vinicius Owens Sr     Arthritis Sister Natali Guerra        Allergies and Medications: (updated and reviewed)  Review of patient's allergies indicates:   Allergen Reactions    Perfume Rash     Allergic to certain cologne fragrance - rash certain area of the body      Current Outpatient Medications   Medication Sig Dispense Refill    amLODIPine (NORVASC) 10 MG tablet TAKE 1 TABLET EVERY DAY 90 tablet 3    atorvastatin (LIPITOR) 20 MG tablet Take 1 tablet (20 mg total) by mouth once daily. 90 tablet 3    meloxicam (MOBIC) 15 MG tablet Take 1 tablet (15 mg total) by mouth daily as needed for Pain. 30 tablet 11    multivitamin (ONE DAILY MULTIVITAMIN) per tablet Take 1 tablet by mouth once daily.       rivaroxaban (XARELTO) 20 mg Tab Take 1 tablet (20 mg total) by mouth daily with dinner or evening meal. 30 tablet 11    tamsulosin (FLOMAX) 0.4 mg Cap Take 1 capsule (0.4 mg total) by mouth once daily. 90 capsule 3    tiZANidine (ZANAFLEX) 4 MG tablet TAKE 1 TO 2 TABLETS BY MOUTH NIGHTLY AS NEEDED FOR PAIN 180 tablet 2    vitamin D (VITAMIN D3) 1000 units Tab Take 5,000 Units by mouth once daily.       No current facility-administered medications for this visit.       Patient Care Team:  Brittnee Hoskins MD as PCP - General (Internal Medicine)         Exam      Review of Systems:  (as noted above)  Review of Systems   Musculoskeletal:  Positive for back pain. Negative for gait problem.   All other systems reviewed and are negative.      Physical Exam:  Physical Exam  Vitals and nursing note reviewed.   Constitutional:       Appearance: Normal appearance. He is normal weight.   HENT:      Head: Normocephalic and atraumatic.   Eyes:      Extraocular Movements: Extraocular movements intact.      Pupils: Pupils are equal, round, and reactive to light.   Cardiovascular:      Rate and Rhythm: Normal rate and regular rhythm.      Pulses: Normal pulses.      Heart sounds: Normal heart sounds.   Pulmonary:      Effort: Pulmonary effort is normal.      Breath sounds: Normal breath sounds.   Abdominal:      General: Abdomen is flat.      Palpations: Abdomen is soft.   Musculoskeletal:         General: Tenderness (lower spine) present. No deformity. Normal range of motion.      Cervical back: Normal range of motion and neck supple.   Skin:     General: Skin is warm and dry.   Neurological:      Mental Status: He is alert and oriented to person, place, and time. Mental status is at baseline.   Psychiatric:         Mood and Affect: Mood normal.         Behavior: Behavior normal.       Vitals:    12/03/24 1103   BP: 128/72   BP Location: Right arm   Patient Position: Sitting   Pulse: 67   Resp: 16   Temp: 98 °F (36.7 °C)   TempSrc:  "Temporal   SpO2: 99%   Weight: 101.6 kg (223 lb 15.8 oz)   Height: 5' 9" (1.753 m)      Body mass index is 33.08 kg/m².           Assessment & Plan      1. Lumbar radiculopathy, chronic  - Patient to continue Mobic and Tizanidine as prescribed for pain and muscle spasms.  - MRI Lumbar Spine W WO Contrast; Future  - Creatinine, serum: WNL. May proceed with MRI.  - Ambulatory referral/consult to Physical/Occupational Therapy; Future    2. Essential hypertension  - Controlled on current med management.    3. Hyperlipidemia, acquired  - Controlled on current med management.           --------------------------------------------      Health Maintenance:  Health Maintenance         Date Due Completion Date    Abdominal Aortic Aneurysm Screening Never done ---    PROSTATE-SPECIFIC ANTIGEN 03/19/2025 3/19/2024    Colorectal Cancer Screening 06/10/2025 6/10/2015    Hemoglobin A1c (Diabetic Prevention Screening) 03/19/2027 3/19/2024    Lipid Panel 03/19/2029 3/19/2024    TETANUS VACCINE 10/02/2029 10/2/2019            Health maintenance reviewed    Follow Up:  No follow-ups on file.       - The patient is given an After Visit Summary that lists all medications with directions, allergies, education, orders placed during this encounter and follow-up instructions.      - I have reviewed the patient's medical information including past medical, family, and social history sections including the medications and allergies.      - We discussed the patient's current medications.     This note was created by combination of typed  and MModal dictation.  Transcription errors may be present.  If there are any questions, please contact me.     Milagro Patel PA-C  [facility]          "

## 2024-12-05 ENCOUNTER — CLINICAL SUPPORT (OUTPATIENT)
Dept: REHABILITATION | Facility: OTHER | Age: 69
End: 2024-12-05
Payer: MEDICARE

## 2024-12-05 DIAGNOSIS — R29.898 DECREASED STRENGTH OF TRUNK AND BACK: Primary | ICD-10-CM

## 2024-12-05 DIAGNOSIS — M25.69 DECREASED RANGE OF MOTION OF TRUNK AND BACK: ICD-10-CM

## 2024-12-05 DIAGNOSIS — M54.16 LUMBAR RADICULOPATHY, CHRONIC: ICD-10-CM

## 2024-12-05 PROCEDURE — 97161 PT EVAL LOW COMPLEX 20 MIN: CPT | Mod: HCNC

## 2024-12-05 PROCEDURE — 97110 THERAPEUTIC EXERCISES: CPT | Mod: HCNC

## 2024-12-11 DIAGNOSIS — R35.1 NOCTURIA: ICD-10-CM

## 2024-12-11 RX ORDER — TAMSULOSIN HYDROCHLORIDE 0.4 MG/1
0.4 CAPSULE ORAL DAILY
Qty: 90 CAPSULE | Refills: 3 | Status: CANCELLED | OUTPATIENT
Start: 2024-12-11 | End: 2025-12-11

## 2024-12-11 NOTE — TELEPHONE ENCOUNTER
No care due was identified.  Health [facility] Embedded Care Due Messages. Reference number: [id number].   12/11/2024 9:54:36 AM CST

## 2024-12-11 NOTE — TELEPHONE ENCOUNTER
Spoke with patient regarding refill request that was put in for tamsulosin. Let patient know that they still have refills remaining. Patient understood

## 2024-12-12 ENCOUNTER — CLINICAL SUPPORT (OUTPATIENT)
Dept: REHABILITATION | Facility: OTHER | Age: 69
End: 2024-12-12
Payer: MEDICARE

## 2024-12-12 ENCOUNTER — HOSPITAL ENCOUNTER (OUTPATIENT)
Dept: RADIOLOGY | Facility: HOSPITAL | Age: 69
Discharge: HOME | End: 2024-12-12
Payer: MEDICARE

## 2024-12-12 DIAGNOSIS — R29.898 DECREASED STRENGTH OF TRUNK AND BACK: Primary | ICD-10-CM

## 2024-12-12 DIAGNOSIS — M25.69 DECREASED RANGE OF MOTION OF TRUNK AND BACK: ICD-10-CM

## 2024-12-12 DIAGNOSIS — M54.16 LUMBAR RADICULOPATHY, CHRONIC: ICD-10-CM

## 2024-12-12 PROCEDURE — A9585 GADOBUTROL INJECTION: HCPCS | Mod: HCNC

## 2024-12-12 PROCEDURE — 72158 MRI LUMBAR SPINE W/O & W/DYE: CPT | Mod: 26,HCNC,, | Performed by: RADIOLOGY

## 2024-12-12 PROCEDURE — 97110 THERAPEUTIC EXERCISES: CPT | Mod: HCNC,CQ

## 2024-12-12 PROCEDURE — 25500020 PHARM REV CODE 255: Mod: HCNC

## 2024-12-12 PROCEDURE — 97112 NEUROMUSCULAR REEDUCATION: CPT | Mod: HCNC,CQ

## 2024-12-12 PROCEDURE — 72158 MRI LUMBAR SPINE W/O & W/DYE: CPT | Mod: TC,HCNC

## 2024-12-12 RX ORDER — GADOBUTROL 604.72 MG/ML
10 INJECTION INTRAVENOUS
Status: COMPLETED | OUTPATIENT
Start: 2024-12-12 | End: 2024-12-12

## 2024-12-12 RX ADMIN — GADOBUTROL 10 ML: 604.72 INJECTION INTRAVENOUS at 04:12

## 2024-12-12 NOTE — PLAN OF CARE
[facility]  Physical Therapy Initial Evaluation  Date: 12/5/2024   Name: Ascencion Owens  Clinic Number: [id number]    Therapy Diagnosis:   Encounter Diagnoses   Name Primary?    Lumbar radiculopathy, chronic     Decreased strength of trunk and back Yes    Decreased range of motion of trunk and back      Physician: Milagro Patel PA-C    Physician Orders: PT Eval and Treat   Medical Diagnosis from Referral: M54.16 (ICD-10-CM) - Lumbar radiculopathy, chronic   Evaluation Date: 12/5/2024  Authorization Period Expiration: 12/03/25  Plan of Care Expiration: 01/31/25  Visit # / Visits authorized: 01/ 01   Progress Note Due: 01/05/25  FOTO: 0/ 3    PLEASE ADMIN BASELINE FOTO AT VISIT 2      Precautions: Standard    Time In: 11:10 am  Time Out: 12:10 pm   Total Appointment Time (timed & untimed codes): 60 minutes    Subjective   Date of onset: chronic  exacerbation /c bending stretch 2 weeks ago in prep for working out   History of current condition Keily [place] reports: R side LBP /c radiation to L side and B buttocks.  Patient deny N/T BLE.  Patient report minimal sx in AM. Patient report R LB sx as dull ache.    Patient thinking origin of back pain may be from a bending and twisting action > 20 yrs ago.  Patient note recent exacerbation from bending stretch but clarify finding partial relief /c standing lumbar extension.    Patient report dec exercise walking tolerance including having stopped inc intensity walks for exercise.   Patient report inc difficulty /c household chores especially /c bending tasks.    Pt report difficulty /c bending/lifting tasks specifically mentioning limitation in picking up grandkids.  Patient report recent brace use leading to improvement in immediate sx but lead to overall continue discomfort b/c of resultant inc activity from acute relief.    Patient report attending gym 2-3 x wk for circuit training including resistance training and specifically mentioning  performing abdominal crunches.      Per MD report   Mr. Owens presents today with complaints of back pain that worsened after repeated bending and working out last week. He has a known back injury from > 40 years ago, but he has been managing his symptoms with Mobic, Tizanidine and a back brace. He felt like his symptoms were under control until this most recent episode. He now experiences tingling that radiates down his right leg. The Mobic and Tizanidine are still helping, but he is in increased pain.         JYOTHI: insidious progressive from > 20 yr bending incident  Any Bowel and Bladder movement issues:  N  Any falls: N  Any dizziness: N  Any Headache:  N  Any injection in lower back: steroid or epidural: none   Pain radiates:  N  Pain constant or intermitting: intermittent     Pain:  Current 1/10, worst 5/10 /c bending/lifting task, best 0/10 in AM  Location: bilateral LB (R > L)   Description: Aching and Dull  /c shoots of sharp during activity   Aggravating Factors: Bending and Lifting, sit to stand transition   Easing Factors: sitting, wearing brace     Prior Therapy: none  Social History: lives in [facility]  lives with their spouse   Occupation:  retired    Prior Level of Function:  working out regularly, able to walk extended distance   Current Level of Function: pain /c bending/lifting, discontinue walking for exercise, hesitancy at gym workouts     Pts goals: I want to get stronger, be able to  my granddaughter     Imaging:  Per MD report:    None on file.   MRI on order as of 12/05/24       Medical History:   Past Medical History:   Diagnosis Date    Acute bronchitis     Arthritis     CKD (chronic kidney disease)     Cluster headache     Dyslipidemia     Essential hypertension, benign     History of pulmonary embolism 11/09/2012    Hypercholesteremia     Obesity        Surgical History:   Ascencion Owens  has a past surgical history that includes Appendectomy; lt ankle surgery  (01/01/1973); Colonoscopy (01/01/2004); Fracture surgery ([employer]); and Vasectomy (Around 1988).    Medications:   Ascencion has a current medication list which includes the following prescription(s): amlodipine, atorvastatin, meloxicam, multivitamin, rivaroxaban, tamsulosin, tizanidine, and vitamin d.    Allergies:   Review of patient's allergies indicates:   Allergen Reactions    Perfume Rash     Allergic to certain cologne fragrance - rash certain area of the body           Objective       Observation: alert    Posture Alignment: slouched posture;decreased lordosis;    Sensation:    BLE LT intact   L5 myotome intact   Saddle sensation intact      DTR::    B knee unable to elicit   B ankle 1+   Clonus (-)        GAIT DEVIATIONS: Ascencion displays  inc fwd trunk, dec step length     Lumbar Range of Motion:    %   Flexion Min loss guarded motion        Extension Mod loss    inc ROM /p repeated motions    Left Side Bending Mod loss   Right Side Bending Mod loss   Left rotation   Mod loss P!   no P! /p repeated motions    Right Rotation   Mod loss    *= pain    Passive hip ROM in degrees:     Left Right   IR Mod loss Mod loss   ER Mod loss Mod loss P!      Lower Extremity Strength    Right LE  Left LE    Hip flexion: 4+/5 Hip flexion: 4+/5   Knee extension: 5/5 Knee extension: 5/5   Knee flexion: 5/5 Knee flexion: 5/5   Hip IR: 4+/5 Hip IR: 4+/5   Hip ER: 4+/5 Hip ER: 4+/5   Hip extension:  4-/5 Hip extension: 4-/5   Hip abduction: 4/5 inc hip flexor activation Hip abduction: 4/5 inc hip flexor activation   Hip adduction: 5/5 Hip adduction 5/5   Ankle dorsiflexion: 5/5 Ankle dorsiflexion: 5/5   Ankle plantarflexion: 5/5 Ankle plantarflexion: 5/5     Special Tests:  -Quadrant testing: negative  -CHRISTY: negative  -Prone Instability Test: positive    REPEATED TEST MOVEMENTS:    Baseline symptoms:  1/10 LBP  Repeated Flexion in Standing pain during motion  no effect   Repeated Extension in Standing end  range pain  no effect   Repeated Flexion in lying worse   Repeated Extension in lying  end range pain  better       STATIC TESTS and other movements:1/10 LBP  Prone lie better   Prone lie on elbows better   Sitting slouched  no effect   Sitting erect better     SI provocation test: (-)       Neuro Dynamic Testing:    Sciatic nerve:      SLR: negative    Tibial bias: negative    Common Peroneal bias: negative   Femoral Nerve:    Femoral nerve test: negative   Neural Tension:     Slump test: negative    Joint Mobility: firm end feel /c lumbar and thoracic PA's     Flexibility:    Shahbaz test: R = + ; L = +    PT Evaluation Completed? Yes  Discussed Plan of Care with patient: Yes      PLEASE ADMIN BASELINE FOTO AT VISIT 2  CMS Impairment/Limitation/Restriction for FOTO LUMBAR Survey    Therapist reviewed FOTO scores for Ascencion Owens on 12/5/2024.   FOTO documents entered into [employer] - see Media section.    INTAKE Score: **TBD**%    Goal:     MDC = **TBD** points              TREATMENT     Total Treatment time separate from Evaluation: 15 minutes    Ascencion received therapeutic exercises to develop strength, endurance, ROM, flexibility, posture, and core stabilization for 15 minutes including:    HEP demonstrate include:   LTR  x 10 /c wide stance for hip mobility  Supine Hip flexor stretch 2 x 10 sec each side  PPT x 10 cue for dec valsalva  Prone press ups x 10  Standing lumbar ext x 10 cue to use in community for sx self management    NV: continue lumbopelvic stability challenges, being mindful of posture (SOC)    Home Exercises and Patient Education Provided    Education provided:   - Importance of daily HEP performance for safe tx progressions     Written Home Exercises Provided: yes.  Exercises were reviewed and Ascencion was able to demonstrate them prior to the end of the session.  Ascencion demonstrated fair  understanding of the education provided.     See EMR under Patient Instructions for exercises  provided 12/5/2024.    Assessment   Ascencion is a 69 y.o. male referred to outpatient Physical Therapy with a medical diagnosis of M54.16 (ICD-10-CM) - Lumbar radiculopathy, chronic . Pt presents with signs and sx consistent /c diagnosis including dec lumbar ROM, B hip weakness, soft tissue dysfunction, poor posture, LBP leading dec functional mobility, strength and activity tolerance.  Patient presents with reported LBP that is reproduced /c extended walking and bending/lifting.  Both repeated motions testing and subjective report indicate sx reduction /c extension motion, therefore, HEP /c extension bias and patient educated on use of repeated extension proactive to provocative activities for sx self management. Recommend f/u for understanding and application.  Upon physical assessment, pt demonstrates slouched posturing in sitting, mild hip weakness bilaterally, and trunk and hip mobility deficits. Subjective report of pain /c bending/lifting and difficulty /c PPT is HEP demonstrations indicates likely deficit in lumbopelvic stability contributing to pain presentation. Therefore, patient would benefit from LE and trunk mobility training, stability training,  improved cardiovascular and muscular endurance, neuromuscular re-education for posture, coordination, and muscular recruitment and education on positional offloading techniques to decrease the intensity and frequency of flare-ups.   All of the above noted supports potential lumbar classification as a pattern 1PEP with recurrent and consistent symptoms, thus pt is a good candidate for an active physical therapy POC.    PLEASE ADMIN BASELINE FOTO AT VISIT 2.  SEE ABOVE FOR QR CODE    Pt prognosis is Good.   Pt will benefit from skilled outpatient Physical Therapy to address the deficits stated above and in the chart below, provide pt/family education, and to maximize pt's level of independence.     Plan of care discussed with patient: Yes  Pt's spiritual,  cultural and educational needs considered and patient is agreeable to the plan of care and goals as stated below:     Anticipated Barriers for therapy: none    Medical Necessity is demonstrated by the following  History  Co-morbidities and personal factors that may impact the plan of care Co-morbidities:   coping style/mechanism, difficulty sleeping, and level of undertstanding of current condition    Personal Factors:   coping style     low   Examination  Body Structures and Functions, activity limitations and participation restrictions that may impact the plan of care Body Regions:   back  lower extremities  trunk    Body Systems:    gross symmetry  ROM  strength  transitions  motor control    Participation Restrictions:   none    Activity limitations:   Learning and applying knowledge  no deficits    General Tasks and Commands  no deficits    Communication  no deficits    Mobility  lifting and carrying objects  walking    Self care  no deficits    Domestic Life  shopping  cooking  doing house work (cleaning house, washing dishes, laundry)    Interactions/Relationships  no deficits    Life Areas  no deficits    Community and Social Life  community life  recreation and leisure         Complexity: low  See FOTO outcome assessment   Clinical Presentation stable and uncomplicated low   Decision Making/ Complexity Score: low         GOALS: Short Term Goals:  4 weeks  - Report decreased in pain at worse less than  <   / =  4  /10  to increase tolerance for functional activities.Appropriate and Ongoing   - Patient lifting 10# kettle floor to plinth x 10 /s inc LB discomfort for inc tolerance to household chores  Appropriate and Ongoing   - Patient verbalize good understanding of importance of proper posture in resisted exercise, functional activities & ADL's in order to help reduce postural strain, promote proper breathing. Appropriate and Ongoing   - Pt to tolerate HEP to improve ROM and independence with  ADL's.Appropriate and Ongoing       Long Term Goals: 8 weeks  - Report decreased in pain at worse less than  <   / =  2  /10  to increase tolerance for functional mobility.  Appropriate and Ongoing   - Increased B hip ext MMT to 4+/5 grade to increase tolerance for ADL and work activities.Appropriate and Ongoing   - Pt will report at **TBD**% or less limitation on FOTO Lumbar spine survey  to demonstrate decrease in disability and improvement in back pain.Appropriate and Ongoing   - Pt to be Independent with HEP to improve ROM and independence with ADL's. On going  - Pt to demonstrate negative Bridge Test in order to show improved core strength for lumbar stabilization. Appropriate and Ongoing   - Pt to demonstrate ability to independently control and reduce their pain through posture positioning and mechanical movements throughout a typical day. Appropriate and Ongoing  - Patient will report ability to walk > 2 miles /s inc LB discomfort to demonstrate inc tolerance to recreational tasks  Appropriate and Ongoing   - Patient lifting 20# kettle floor to plinth x 10 /s inc LB discomfort for inc tolerance to household chores  Appropriate and Ongoing       Plan   Plan of care Certification: 12/5/2024 to 01/31/25.    Outpatient Physical Therapy 2 times weekly for 8 weeks to include the following interventions: Manual Therapy, Moist Heat/ Ice, Neuromuscular Re-ed, Patient Education, Self Care, Therapeutic Activities, and Therapeutic Exercise. Mark Lucas, PT      I CERTIFY THE NEED FOR THESE SERVICES FURNISHED UNDER THIS PLAN OF TREATMENT AND WHILE UNDER MY CARE   Physician's comments:     Physician's Signature: ___________________________________________________

## 2024-12-12 NOTE — PROGRESS NOTES
"    Physical Therapy Daily Treatment Note     Name: Ascencion Owens  Clinic Number: [id number]    Therapy Diagnosis:   Encounter Diagnoses   Name Primary?    Decreased strength of trunk and back Yes    Decreased range of motion of trunk and back      Physician: Milagro Patel PA-C    Visit Date: 12/12/2024    Physician Orders: PT Eval and Treat   Medical Diagnosis from Referral: M54.16 (ICD-10-CM) - Lumbar radiculopathy, chronic   Evaluation Date: 12/5/2024  Authorization Period Expiration: 12/03/25  Plan of Care Expiration: 01/31/25  Visit # / Visits authorized: 01/ 01   Progress Note Due: 01/05/25  FOTO: 0/ 3     PLEASE ADMIN BASELINE FOTO AT VISIT 3       Precautions: Standard     Time In: 700  am  Time Out: 755 am   Total Appointment Time (timed & untimed codes): 40 minutes (1:1)    Subjective     Pt reports: He is doing alright. He did maybe 50% of the home exercises. He has more stiffness than anything.  he was somewhat compliant with home exercise program given last session.   Response to previous treatment: Eval  Functional change: Ongoing     Pain: 1/10  Location: Mid to R side low back    Pts goals: I want to get stronger, be able to  my granddaughter   Objective     Updated at Progress Report Unless Specified    Treatment     Ascencion received the following manual therapy techniques for 00 minutes:       Ascencion received therapeutic exercises to develop strength, endurance, ROM, flexibility, posture, and core stabilization for 30 minutes including:     Recumbent Bike x 6'  LTR  x 10 /c wide stance for hip mobility  Supine Hip flexor stretch 2 x 1' sec each side  PPT 2 x 10 cue for dec valsalva  Prone press ups x 10  Standing lumbar ext x 15  Bridges 2 x 10   Seated thoracic extension 15 x 5"  Standing hip abduction RTB 2 x 10 - nv              Ascencion participated in dynamic functional therapeutic activities to improve functional performance for 05  minutes, including:  -STS from chair 2 x 10 " "        Ascencion participated in neuromuscular re-education activities to improve: Balance, Coordination, and Posture for 20 minutes. The following activities were included:  -MedX lumbar machine 24# x 5 warm, (0-45) 45# x 15   -Young carry 15#  1 lap around gym ea hand  -reverse hyper 3 x 5 reps  -SAP with BTB 15 x 5" for posture   -paloff press GTB 15 x 5"    Ascencion received ice pack for 00 minutes to low back.      Home Exercises and Education Provided     Education provided:   - DOMS  - Progress towards goals     Written Home Exercises Provided: Patient instructed to cont prior HEP.  Exercises were reviewed and Ascencion was able to demonstrate them prior to the end of the session.  Ascencion demonstrated good  understanding of the HEP provided.   .   See EMR under Patient Instructions for exercises provided 12/5/2024.  Assessment      Ascencion presents to first follow up with out c/o pain. Reviewed HEP with pt able to perform with min cues indicating HEP compliance. Session focused on postural strengthening as pt ambulates with slightly flexed posture. Emphasis on glut and LE strengthening and ROM. Will progress as tolerated.        Ascencion is progressing well towards his goals and there are no updates to goals at this time. Pt prognosis is Good.     Pt will continue to benefit from skilled outpatient physical therapy to address the deficits listed in the problem list on initial evaluation provide pt/family education and to maximize pt's level of independence in the home and community environment.     Anticipated Barriers for therapy: none    Pt's spiritual, cultural and educational needs considered and pt agreeable to plan of care and goals.    GOALS: Short Term Goals:  4 weeks  - Report decreased in pain at worse less than  <   / =  4  /10  to increase tolerance for functional activities.Appropriate and Ongoing   - Patient lifting 10# kettle floor to plinth x 10 /s inc LB discomfort for inc tolerance to household " chores  Appropriate and Ongoing   - Patient verbalize good understanding of importance of proper posture in resisted exercise, functional activities & ADL's in order to help reduce postural strain, promote proper breathing. Appropriate and Ongoing   - Pt to tolerate HEP to improve ROM and independence with ADL's.Appropriate and Ongoing         Long Term Goals: 8 weeks  - Report decreased in pain at worse less than  <   / =  2  /10  to increase tolerance for functional mobility.  Appropriate and Ongoing   - Increased B hip ext MMT to 4+/5 grade to increase tolerance for ADL and work activities.Appropriate and Ongoing   - Pt will report at **TBD**% or less limitation on FOTO Lumbar spine survey  to demonstrate decrease in disability and improvement in back pain.Appropriate and Ongoing   - Pt to be Independent with HEP to improve ROM and independence with ADL's. On going  - Pt to demonstrate negative Bridge Test in order to show improved core strength for lumbar stabilization. Appropriate and Ongoing   - Pt to demonstrate ability to independently control and reduce their pain through posture positioning and mechanical movements throughout a typical day. Appropriate and Ongoing  - Patient will report ability to walk > 2 miles /s inc LB discomfort to demonstrate inc tolerance to recreational tasks  Appropriate and Ongoing   - Patient lifting 20# kettle floor to plinth x 10 /s inc LB discomfort for inc tolerance to household chores  Appropriate and Ongoing         Plan   Plan of care Certification: 12/5/2024 to 01/31/25.      Norm Ontiveros, PTA

## 2024-12-13 ENCOUNTER — TELEPHONE (OUTPATIENT)
Dept: FAMILY MEDICINE | Facility: CLINIC | Age: 69
End: 2024-12-13
Payer: MEDICARE

## 2024-12-13 NOTE — TELEPHONE ENCOUNTER
----- Message from Milagro Patel PA-C sent at 12/13/2024 10:02 AM CST -----  Please contact the patient and let him know that his results confirmed he has a disc protrusion in his spine with minor forward slippage of his lumbar vertebrae. This correlates to his symptoms and confirms his vertebral body changes likely caused by his back injury that occurred several years ago.

## 2024-12-13 NOTE — TELEPHONE ENCOUNTER
Lvm to have pt return call for his results:   he has a disc protrusion in his spine with minor forward slippage of his lumbar vertebrae. This correlates to his symptoms and confirms his vertebral body changes likely caused by his back injury that occurred several years ago.

## 2024-12-16 ENCOUNTER — TELEPHONE (OUTPATIENT)
Dept: FAMILY MEDICINE | Facility: CLINIC | Age: 69
End: 2024-12-16
Payer: MEDICARE

## 2024-12-16 NOTE — PROGRESS NOTES
"    Physical Therapy Daily Treatment Note     Name: Ascencion Owens  Clinic Number: [id number]    Therapy Diagnosis:   Encounter Diagnoses   Name Primary?    Decreased strength of trunk and back Yes    Decreased range of motion of trunk and back        Physician: Milagro Patel PA-C    Visit Date: 12/17/2024    Physician Orders: PT Eval and Treat   Medical Diagnosis from Referral: M54.16 (ICD-10-CM) - Lumbar radiculopathy, chronic   Evaluation Date: 12/5/2024  Authorization Period Expiration: 12/03/25  Plan of Care Expiration: 01/31/25  Visit # / Visits authorized: 02/ 08  Progress Note Due: 01/05/25  FOTO: 1/ 3          Precautions: Standard     Time In: 300 pm  Time Out: 400 pm   Total Appointment Time (timed & untimed codes): 60 minutes (1:1)    Subjective     Pt reports: The sharp pain is becoming less and less sharp as he does the exercises. He does feel like sometimes he aggravates it like putting on his socks and shoes. He is worried that sometimes he is going to injure himself.   he was somewhat compliant with home exercise program given last session.   Response to previous treatment: Eval  Functional change: Ongoing     Pain: 1/10  Location: Mid to R side low back    Pts goals: I want to get stronger, be able to  my granddaughter   Objective     Updated at Progress Report Unless Specified    Treatment     Ascencion received the following manual therapy techniques for 00 minutes:       Ascencion received therapeutic exercises to develop strength, endurance, ROM, flexibility, posture, and core stabilization for 35 minutes including:     Recumbent Bike x 6'  LTR  x 10 /c wide stance for hip mobility  Supine Hip flexor stretch  x 1' sec each side  PPT 2 x 10 cue for dec valsalva  Prone press ups x 10  +Prone on elbows x 2'  Standing lumbar ext x 15  Bridges 2 x 10   Seated thoracic extension 15 x 5"  Standing hip abduction RTB 2 x 10       Ascencion participated in dynamic functional therapeutic activities " "to improve functional performance for 05  minutes, including:  -STS from chair 2 x 10 +7.5      Ascencion participated in neuromuscular re-education activities to improve: Balance, Coordination, and Posture for 20 minutes. The following activities were included:  -MedX lumbar machine 24# x 5 warm, (0-45) 50# x 15   -Young carry 15#  1 lap around gym ea hand  -reverse hyper 3 x 5 reps  -SAP with BTB 15 x 5" for posture   -paloff press GTB 15 x 5"    Ascencion received ice pack for 00 minutes to low back.      Home Exercises and Education Provided     Education provided:   - DOMS  - Progress towards goals     Written Home Exercises Provided: Patient instructed to cont prior HEP.  Exercises were reviewed and Ascencion was able to demonstrate them prior to the end of the session.  Ascencion demonstrated good  understanding of the HEP provided.   .   See EMR under Patient Instructions for exercises provided 12/5/2024.  Assessment      Ascencion presents to first follow up with reports of improving symptoms. Pt continues to respond well to extension which reduces symptoms. Increased resistance on lumbar extension machine though some discomfort with flexion. Will progress as tolerated.        Ascencion is progressing well towards his goals and there are no updates to goals at this time. Pt prognosis is Good.     Pt will continue to benefit from skilled outpatient physical therapy to address the deficits listed in the problem list on initial evaluation provide pt/family education and to maximize pt's level of independence in the home and community environment.     Anticipated Barriers for therapy: none    Pt's spiritual, cultural and educational needs considered and pt agreeable to plan of care and goals.    GOALS: Short Term Goals:  4 weeks  - Report decreased in pain at worse less than  <   / =  4  /10  to increase tolerance for functional activities.Appropriate and Ongoing   - Patient lifting 10# kettle floor to plinth x 10 /s inc " LB discomfort for inc tolerance to household chores  Appropriate and Ongoing   - Patient verbalize good understanding of importance of proper posture in resisted exercise, functional activities & ADL's in order to help reduce postural strain, promote proper breathing. Appropriate and Ongoing   - Pt to tolerate HEP to improve ROM and independence with ADL's.Appropriate and Ongoing         Long Term Goals: 8 weeks  - Report decreased in pain at worse less than  <   / =  2  /10  to increase tolerance for functional mobility.  Appropriate and Ongoing   - Increased B hip ext MMT to 4+/5 grade to increase tolerance for ADL and work activities.Appropriate and Ongoing   - Pt will report at **TBD**% or less limitation on FOTO Lumbar spine survey  to demonstrate decrease in disability and improvement in back pain.Appropriate and Ongoing   - Pt to be Independent with HEP to improve ROM and independence with ADL's. On going  - Pt to demonstrate negative Bridge Test in order to show improved core strength for lumbar stabilization. Appropriate and Ongoing   - Pt to demonstrate ability to independently control and reduce their pain through posture positioning and mechanical movements throughout a typical day. Appropriate and Ongoing  - Patient will report ability to walk > 2 miles /s inc LB discomfort to demonstrate inc tolerance to recreational tasks  Appropriate and Ongoing   - Patient lifting 20# kettle floor to plinth x 10 /s inc LB discomfort for inc tolerance to household chores  Appropriate and Ongoing         Plan   Plan of care Certification: 12/5/2024 to 01/31/25.      Norm Ontiveros, PTA

## 2024-12-17 ENCOUNTER — CLINICAL SUPPORT (OUTPATIENT)
Dept: REHABILITATION | Facility: OTHER | Age: 69
End: 2024-12-17
Payer: MEDICARE

## 2024-12-17 DIAGNOSIS — M25.69 DECREASED RANGE OF MOTION OF TRUNK AND BACK: ICD-10-CM

## 2024-12-17 DIAGNOSIS — R29.898 DECREASED STRENGTH OF TRUNK AND BACK: Primary | ICD-10-CM

## 2024-12-17 PROCEDURE — 97112 NEUROMUSCULAR REEDUCATION: CPT | Mod: HCNC,CQ

## 2024-12-17 PROCEDURE — 97110 THERAPEUTIC EXERCISES: CPT | Mod: HCNC,CQ

## 2024-12-19 NOTE — PROGRESS NOTES
"    Physical Therapy Daily Treatment Note     Name: Ascencion Owens  Clinic Number: [id number]    Therapy Diagnosis:   Encounter Diagnoses   Name Primary?    Decreased strength of trunk and back Yes    Decreased range of motion of trunk and back          Physician: Milagro Patel PA-C    Visit Date: 12/20/2024    Physician Orders: PT Eval and Treat   Medical Diagnosis from Referral: M54.16 (ICD-10-CM) - Lumbar radiculopathy, chronic   Evaluation Date: 12/5/2024  Authorization Period Expiration: 12/31/24  Plan of Care Expiration: 1/31/25  Visit # / Visits authorized: 3/ 8  Progress Note Due: 1/05/25  FOTO: 1/ 3          Precautions: Standard     Time In: 11:00 am  Time Out: 12:00 pm   Total Appointment Time (timed & untimed codes): 60 minutes (30 min 1:1)    Subjective     Pt reports: that he overall is feeling better. Had some increase in symptoms are doing a lot of vacuuming, but overall better.  he was somewhat compliant with home exercise program given last session.   Response to previous treatment: good  Functional change: improvement in overall symptoms    Pain: 1/10  Location: Mid to R side low back    Pts goals: I want to get stronger, be able to  my granddaughter   Objective     Updated at Progress Report Unless Specified    Treatment     Ascencion received the following manual therapy techniques for 5 minutes:   Prone unilateral P/As lumbar spine grade 2/3  Prone L knee FL stretch    Ascencion received therapeutic exercises to develop strength, endurance, ROM, flexibility, posture, and core stabilization for 40 minutes including:  Recumbent Bike x 6' for joint nutrition  LTR  x 10 /c wide stance for hip mobility  Supine Hip flexor stretch  x 1' sec each side  PPT 2 x 10 cue for dec valsalva  Prone press ups x 10  Prone on elbows x 2'  Standing lumbar ext x 15- NP  Bridges 2 x 10   Seated thoracic extension 15 x 5"  Standing glut med RTB 2 x 10 3 second holds  2 rounds   10 sit<>stands 7.5#   SA 15# " "farmers carry x 1 lap each side    Ascencion participated in dynamic functional therapeutic activities to improve functional performance for 0 minutes, including:  NA today    Ascencion participated in neuromuscular re-education activities to improve: Balance, Coordination, and Posture for 10 minutes. The following activities were included:  -MedX lumbar machine 24# x 5 warm, (0-50) 54# x 15   -Reverse hyper 3 x 5 reps    NP  -SAP with BTB 15 x 5" for posture   -paloff press GTB 15 x 5"    Ascencion received ice pack for 5 minutes to low back in Z lie.    Home Exercises and Education Provided     Education provided:   - DOMS  - Progress towards goals     Written Home Exercises Provided: Patient instructed to cont prior HEP.  Exercises were reviewed and Ascencion was able to demonstrate them prior to the end of the session.  Ascencion demonstrated good  understanding of the HEP provided.   .   See EMR under Patient Instructions for exercises provided 12/5/2024.  Assessment   Patient demonstrated good tolerance to strengthening and lumbar/hip mobility work. Improvement in mobility and symptoms by the end of the session.    Ascencion is progressing well towards his goals and there are no updates to goals at this time. Pt prognosis is Good.     Pt will continue to benefit from skilled outpatient physical therapy to address the deficits listed in the problem list on initial evaluation provide pt/family education and to maximize pt's level of independence in the home and community environment.     Anticipated Barriers for therapy: none    Pt's spiritual, cultural and educational needs considered and pt agreeable to plan of care and goals.    GOALS: Short Term Goals:  4 weeks  - Report decreased in pain at worse less than  <   / =  4  /10  to increase tolerance for functional activities.Appropriate and Ongoing   - Patient lifting 10# kettle floor to plinth x 10 /s inc LB discomfort for inc tolerance to household chores  Appropriate " and Ongoing   - Patient verbalize good understanding of importance of proper posture in resisted exercise, functional activities & ADL's in order to help reduce postural strain, promote proper breathing. Appropriate and Ongoing   - Pt to tolerate HEP to improve ROM and independence with ADL's.Appropriate and Ongoing         Long Term Goals: 8 weeks  - Report decreased in pain at worse less than  <   / =  2  /10  to increase tolerance for functional mobility.  Appropriate and Ongoing   - Increased B hip ext MMT to 4+/5 grade to increase tolerance for ADL and work activities.Appropriate and Ongoing   - Pt will report at **TBD**% or less limitation on FOTO Lumbar spine survey  to demonstrate decrease in disability and improvement in back pain.Appropriate and Ongoing   - Pt to be Independent with HEP to improve ROM and independence with ADL's. On going  - Pt to demonstrate negative Bridge Test in order to show improved core strength for lumbar stabilization. Appropriate and Ongoing   - Pt to demonstrate ability to independently control and reduce their pain through posture positioning and mechanical movements throughout a typical day. Appropriate and Ongoing  - Patient will report ability to walk > 2 miles /s inc LB discomfort to demonstrate inc tolerance to recreational tasks  Appropriate and Ongoing   - Patient lifting 20# kettle floor to plinth x 10 /s inc LB discomfort for inc tolerance to household chores  Appropriate and Ongoing      Plan   Plan of care Certification: 12/5/2024 to 01/31/25.    Continue POC.    Anila Griffin, PT

## 2024-12-20 ENCOUNTER — CLINICAL SUPPORT (OUTPATIENT)
Dept: REHABILITATION | Facility: OTHER | Age: 69
End: 2024-12-20
Payer: MEDICARE

## 2024-12-20 DIAGNOSIS — M25.69 DECREASED RANGE OF MOTION OF TRUNK AND BACK: ICD-10-CM

## 2024-12-20 DIAGNOSIS — R29.898 DECREASED STRENGTH OF TRUNK AND BACK: Primary | ICD-10-CM

## 2024-12-20 PROCEDURE — 97110 THERAPEUTIC EXERCISES: CPT | Mod: HCNC

## 2024-12-24 ENCOUNTER — CLINICAL SUPPORT (OUTPATIENT)
Dept: REHABILITATION | Facility: OTHER | Age: 69
End: 2024-12-24
Payer: MEDICARE

## 2024-12-24 DIAGNOSIS — R29.898 DECREASED STRENGTH OF TRUNK AND BACK: Primary | ICD-10-CM

## 2024-12-24 DIAGNOSIS — M25.69 DECREASED RANGE OF MOTION OF TRUNK AND BACK: ICD-10-CM

## 2024-12-24 PROCEDURE — 97112 NEUROMUSCULAR REEDUCATION: CPT | Mod: HCNC,CQ

## 2024-12-24 PROCEDURE — 97110 THERAPEUTIC EXERCISES: CPT | Mod: HCNC,CQ

## 2024-12-24 NOTE — PROGRESS NOTES
"    Physical Therapy Daily Treatment Note     Name: Ascencion Owens  Clinic Number: [id number]    Therapy Diagnosis:   Encounter Diagnoses   Name Primary?    Decreased strength of trunk and back Yes    Decreased range of motion of trunk and back            Physician: Milagro Patel PA-C    Visit Date: 12/24/2024    Physician Orders: PT Eval and Treat   Medical Diagnosis from Referral: M54.16 (ICD-10-CM) - Lumbar radiculopathy, chronic   Evaluation Date: 12/5/2024  Authorization Period Expiration: 12/31/24  Plan of Care Expiration: 1/31/25  Visit # / Visits authorized: 3/ 8  Progress Note Due: 1/05/25  FOTO: 1/ 3          Precautions: Standard     Time In: 1205  pm   Time Out: 1255 PM   Total Appointment Time (timed & untimed codes): 50 minutes    Subjective     Pt reports: He is stiff today. He felt good for 3 days after last session but then he might have over done it.  he was somewhat compliant with home exercise program given last session.   Response to previous treatment: good  Functional change: improvement in overall symptoms    Pain: 1/10  Location: Mid to R side low back    Pts goals: I want to get stronger, be able to  my granddaughter   Objective     Updated at Progress Report Unless Specified    Treatment     Ascencion received the following manual therapy techniques for 0 minutes:   Prone unilateral P/As lumbar spine grade 2/3  Prone L knee FL stretch    Ascencion received therapeutic exercises to develop strength, endurance, ROM, flexibility, posture, and core stabilization for 40 minutes including:  Recumbent Bike x 6' for joint nutrition  LTR  x 10 /c wide stance for hip mobility  Supine Hip flexor stretch  x 1' sec each side  PPT 2 x 10 cue for dec valsalva  Prone press ups x 10  Prone on elbows x 2'  Standing lumbar ext x 15- NP  Bridges 2 x 10   Seated thoracic extension 15 x 5"  Standing glut med RTB 2 x 10 3 second holds  2 rounds   10 sit<>stands 7.5#   SA 15# farmers carry x 1 lap each " "side    Ascencion participated in dynamic functional therapeutic activities to improve functional performance for 0 minutes, including:  NA today    Ascencion participated in neuromuscular re-education activities to improve: Balance, Coordination, and Posture for 10 minutes. The following activities were included:  -MedX lumbar machine 24# x 5 warm, (0-50) 54# x +18  -Reverse hyper 3 x 5 reps    NP  -SAP with BTB 15 x 5" for posture   -paloff press GTB 15 x 5"    Ascencion received ice pack for 5 minutes to low back in Z lie.    Home Exercises and Education Provided     Education provided:   - DOMS  - Progress towards goals     Written Home Exercises Provided: Patient instructed to cont prior HEP.  Exercises were reviewed and Ascencion was able to demonstrate them prior to the end of the session.  Ascencion demonstrated good  understanding of the HEP provided.   .   See EMR under Patient Instructions for exercises provided 12/5/2024.  Assessment   Ascencion reports to therapy with min pain though pt reports recent exacerbation of symptoms when flexing forward in the shower. Pt requires frequent vc throughout session for upright posture as pt tends to ambulate with flexed posture. Pt tolerated progressions well.     Ascencion is progressing well towards his goals and there are no updates to goals at this time. Pt prognosis is Good.     Pt will continue to benefit from skilled outpatient physical therapy to address the deficits listed in the problem list on initial evaluation provide pt/family education and to maximize pt's level of independence in the home and community environment.     Anticipated Barriers for therapy: none    Pt's spiritual, cultural and educational needs considered and pt agreeable to plan of care and goals.    GOALS: Short Term Goals:  4 weeks  - Report decreased in pain at worse less than  <   / =  4  /10  to increase tolerance for functional activities.Appropriate and Ongoing   - Patient lifting 10# kettle " floor to plinth x 10 /s inc LB discomfort for inc tolerance to household chores  Appropriate and Ongoing   - Patient verbalize good understanding of importance of proper posture in resisted exercise, functional activities & ADL's in order to help reduce postural strain, promote proper breathing. Appropriate and Ongoing   - Pt to tolerate HEP to improve ROM and independence with ADL's.Appropriate and Ongoing         Long Term Goals: 8 weeks  - Report decreased in pain at worse less than  <   / =  2  /10  to increase tolerance for functional mobility.  Appropriate and Ongoing   - Increased B hip ext MMT to 4+/5 grade to increase tolerance for ADL and work activities.Appropriate and Ongoing   - Pt will report at **TBD**% or less limitation on FOTO Lumbar spine survey  to demonstrate decrease in disability and improvement in back pain.Appropriate and Ongoing   - Pt to be Independent with HEP to improve ROM and independence with ADL's. On going  - Pt to demonstrate negative Bridge Test in order to show improved core strength for lumbar stabilization. Appropriate and Ongoing   - Pt to demonstrate ability to independently control and reduce their pain through posture positioning and mechanical movements throughout a typical day. Appropriate and Ongoing  - Patient will report ability to walk > 2 miles /s inc LB discomfort to demonstrate inc tolerance to recreational tasks  Appropriate and Ongoing   - Patient lifting 20# kettle floor to plinth x 10 /s inc LB discomfort for inc tolerance to household chores  Appropriate and Ongoing      Plan   Plan of care Certification: 12/5/2024 to 01/31/25.    Continue POC.    Norm Ontiveros, PTA

## 2024-12-30 NOTE — PROGRESS NOTES
Physical Therapy Daily Treatment Note     Name: Ascencion Owens  Clinic Number: [id number]    Therapy Diagnosis:   Encounter Diagnoses   Name Primary?    Decreased strength of trunk and back Yes    Decreased range of motion of trunk and back        Physician: Milagro Patel PA-C    Visit Date: 12/31/2024    Physician Orders: PT Eval and Treat   Medical Diagnosis from Referral: M54.16 (ICD-10-CM) - Lumbar radiculopathy, chronic   Evaluation Date: 12/5/2024  Authorization Period Expiration: 12/31/24  Plan of Care Expiration: 1/31/25  Visit # / Visits authorized: 5/ 8  Progress Note Due: 1/05/25  FOTO: 2/ 3          Precautions: Standard     Time In: 10:00 am  Time Out: 11:00 am   Total Appointment Time (timed & untimed codes): 60 minutes (30 min 1:1)    Subjective     Pt reports: that he is feeling okay today. No new complaints.   he was somewhat compliant with home exercise program given last session.   Response to previous treatment: good  Functional change: improvement in sharp pain and able to bend over    Pain: 1/10  Location: Mid to R side low back    Pts goals: I want to get stronger, be able to  my granddaughter   Objective     Updated at Progress Report Unless Specified    Treatment     Ascencion received the following manual therapy techniques for 0 minutes:   NA today    Prone unilateral P/As lumbar spine grade 2/3  Prone L knee FL stretch    Ascencion received therapeutic exercises to develop strength, endurance, ROM, flexibility, posture, and core stabilization for 45 minutes including:  Recumbent Bike x 6' for joint nutrition  LTR  x 10 /c wide stance for hip mobility  Supine Hip flexor stretch  x 1' sec each side  PPT x 5 reps cue for dec valsalva  Prone press ups x 10   Prone on elbows x 1 minute  Bridges 2 x 10 3 second holds  Torso rotation machine x 5 reps ROM and 20# x 10 reps each side  2 rounds   8 sit<>stands 10#   SA 15# farmers carry x 1 lap each side    NP  Seated thoracic extension  "15 x 5"  Standing lumbar ext x 15  Standing glut med RTB 2 x 10 3 second holds    Ascencion participated in dynamic functional therapeutic activities to improve functional performance for 0 minutes, including:  NA today    Ascencion participated in neuromuscular re-education activities to improve: Balance, Coordination, and Posture for 10 minutes. The following activities were included:  - Supine TA + SLR 2 x 10 each side  -MedX lumbar machine 24# x 5 warm, (0-50) 54# x +18  - Supine BKFO B TB x 15 reps 3 second holds + TA    NP  -paloff press GTB 15 x 5"  -Reverse hyper 3 x 5 reps    Ascencion received ice pack for 5 minutes to low back in Z lie.    Home Exercises and Education Provided     Education provided:   - DOMS  - Progress towards goals     Written Home Exercises Provided: Patient instructed to cont prior HEP.  Exercises were reviewed and Ascencion was able to demonstrate them prior to the end of the session.  Ascencion demonstrated good  understanding of the HEP provided.   .   See EMR under Patient Instructions for exercises provided 12/5/2024.  Assessment   Patient demonstrated good tolerance to strengthening and motor control work this session. Improvement in lumbar FL and rotation ROM by the end of the session.     Ascencion is progressing well towards his goals and there are no updates to goals at this time. Pt prognosis is Good.     Pt will continue to benefit from skilled outpatient physical therapy to address the deficits listed in the problem list on initial evaluation provide pt/family education and to maximize pt's level of independence in the home and community environment.     Anticipated Barriers for therapy: none    Pt's spiritual, cultural and educational needs considered and pt agreeable to plan of care and goals.    GOALS: Short Term Goals:  4 weeks  - Report decreased in pain at worse less than  <   / =  4  /10  to increase tolerance for functional activities.Appropriate and Ongoing   - Patient " lifting 10# kettle floor to plinth x 10 /s inc LB discomfort for inc tolerance to household chores  Appropriate and Ongoing   - Patient verbalize good understanding of importance of proper posture in resisted exercise, functional activities & ADL's in order to help reduce postural strain, promote proper breathing. Appropriate and Ongoing   - Pt to tolerate HEP to improve ROM and independence with ADL's.Appropriate and Ongoing         Long Term Goals: 8 weeks  - Report decreased in pain at worse less than  <   / =  2  /10  to increase tolerance for functional mobility.  Appropriate and Ongoing   - Increased B hip ext MMT to 4+/5 grade to increase tolerance for ADL and work activities.Appropriate and Ongoing   - Pt will report at **TBD**% or less limitation on FOTO Lumbar spine survey  to demonstrate decrease in disability and improvement in back pain.Appropriate and Ongoing   - Pt to be Independent with HEP to improve ROM and independence with ADL's. On going  - Pt to demonstrate negative Bridge Test in order to show improved core strength for lumbar stabilization. Appropriate and Ongoing   - Pt to demonstrate ability to independently control and reduce their pain through posture positioning and mechanical movements throughout a typical day. Appropriate and Ongoing  - Patient will report ability to walk > 2 miles /s inc LB discomfort to demonstrate inc tolerance to recreational tasks  Appropriate and Ongoing   - Patient lifting 20# kettle floor to plinth x 10 /s inc LB discomfort for inc tolerance to household chores  Appropriate and Ongoing      Plan   Plan of care Certification: 12/5/2024 to 01/31/25.    Continue POC.    Anila Griffin, PT

## 2024-12-31 ENCOUNTER — CLINICAL SUPPORT (OUTPATIENT)
Dept: REHABILITATION | Facility: OTHER | Age: 69
End: 2024-12-31
Payer: MEDICARE

## 2024-12-31 DIAGNOSIS — M25.69 DECREASED RANGE OF MOTION OF TRUNK AND BACK: ICD-10-CM

## 2024-12-31 DIAGNOSIS — R29.898 DECREASED STRENGTH OF TRUNK AND BACK: Primary | ICD-10-CM

## 2024-12-31 PROCEDURE — 97110 THERAPEUTIC EXERCISES: CPT | Mod: HCNC

## 2025-01-07 NOTE — PROGRESS NOTES
Physical Therapy Daily Treatment Note     Name: Ascencion Owens  Clinic Number: [id number]    Therapy Diagnosis:   Encounter Diagnoses   Name Primary?    Decreased strength of trunk and back Yes    Decreased range of motion of trunk and back          Physician: Milagro Patel PA-C    Visit Date: 1/8/2025    Physician Orders: PT Eval and Treat   Medical Diagnosis from Referral: M54.16 (ICD-10-CM) - Lumbar radiculopathy, chronic   Evaluation Date: 12/5/2024  Authorization Period Expiration: 12/31/24  Plan of Care Expiration: 1/31/25  Visit # / Visits authorized: 6/ 8  Progress Note Due: 1/05/25  FOTO: 2/ 3          Precautions: Standard     Time In: 1055 AM   Time Out: 1055 AM   Total Appointment Time (timed & untimed codes): 60 minutes    Subjective     Pt reports: He is doing good. No pain today. He went to the gym and did the elliptical for 30 minutes but was too afraid to try the circuit again. He used to go to the gym and spend 30 mins on cardio and then do a machine circuit. Discussed giving it a try and if something comes up we can address in therapy. He originally hurt his back bending forward and stretching.   he was somewhat compliant with home exercise program given last session.   Response to previous treatment: good  Functional change: improvement in sharp pain and able to bend over    Pain: 0/10  Location: Mid to R side low back    Pts goals: I want to get stronger, be able to  my granddaughter   Objective     Updated at Progress Report Unless Specified    Treatment     Ascencion received the following manual therapy techniques for 0 minutes:   NA today    Prone unilateral P/As lumbar spine grade 2/3  Prone L knee FL stretch    Ascencion received therapeutic exercises to develop strength, endurance, ROM, flexibility, posture, and core stabilization for 40 minutes including:    Recumbent Bike x 7' for joint nutrition  LTR  x 10 /c wide stance for hip mobility  Supine Hip flexor stretch  x 1' sec  "each side  PPT x 5 + 10 with march 90/90 holds 3 x 20"  Prone press ups 2  x 10   Bridges 2 x 10 3 second holds  Torso rotation machine x 5 reps ROM and 20# x 10 reps each side  2 rounds   10 sit<>stands 10#   Bilateral 15# farmers carry x 1 lap (20kb 2nd round)   Front carry 1 lap 15kb (20kb 2nd round)    NP  Seated thoracic extension 15 x 5"  Standing lumbar ext x 15  Standing glut med RTB 2 x 10 3 second holds    Ascencion participated in dynamic functional therapeutic activities to improve functional performance for 0 minutes, including:  NA today    Ascencion participated in neuromuscular re-education activities to improve: Balance, Coordination, and Posture for 20 minutes. The following activities were included:  - Supine TA + SLR 2 x 10 each side  -MedX lumbar machine 24# x 5 warm, (0-50) 54# x +18  - Supine BKFO B TB x 15 reps 3 second holds + TA - np  + March with KB hold 20# 2 x 10   +lunge to 6 in box x 10, lunge to 4 in step x 10 ea    NP  -paloff press GTB 15 x 5"  -Reverse hyper 3 x 5 reps    Ascencion received ice pack for 5 minutes to low back in Z lie.    Home Exercises and Education Provided     Education provided:   - DOMS  - Progress towards goals     Written Home Exercises Provided: Patient instructed to cont prior HEP.  Exercises were reviewed and Ascencion was able to demonstrate them prior to the end of the session.  Ascencion demonstrated good  understanding of the HEP provided.   .   See EMR under Patient Instructions for exercises provided 12/5/2024.  Assessment   Patient presents without c/o pain. Patient tolerated progressed core and lumbopelvic stabilization exercises well without adverse effects and appropriate challenge. Pt challenged with lumbopelic stability in quadruped. Introduced stationary lunges to improve functional mobility.        Ascencion is progressing well towards his goals and there are no updates to goals at this time. Pt prognosis is Good.     Pt will continue to benefit from " skilled outpatient physical therapy to address the deficits listed in the problem list on initial evaluation provide pt/family education and to maximize pt's level of independence in the home and community environment.     Anticipated Barriers for therapy: none    Pt's spiritual, cultural and educational needs considered and pt agreeable to plan of care and goals.    GOALS: Short Term Goals:  4 weeks  - Report decreased in pain at worse less than  <   / =  4  /10  to increase tolerance for functional activities.Appropriate and Ongoing   - Patient lifting 10# kettle floor to plinth x 10 /s inc LB discomfort for inc tolerance to household chores  Appropriate and Ongoing   - Patient verbalize good understanding of importance of proper posture in resisted exercise, functional activities & ADL's in order to help reduce postural strain, promote proper breathing. Appropriate and Ongoing   - Pt to tolerate HEP to improve ROM and independence with ADL's.Appropriate and Ongoing         Long Term Goals: 8 weeks  - Report decreased in pain at worse less than  <   / =  2  /10  to increase tolerance for functional mobility.  Appropriate and Ongoing   - Increased B hip ext MMT to 4+/5 grade to increase tolerance for ADL and work activities.Appropriate and Ongoing   - Pt will report at **TBD**% or less limitation on FOTO Lumbar spine survey  to demonstrate decrease in disability and improvement in back pain.Appropriate and Ongoing   - Pt to be Independent with HEP to improve ROM and independence with ADL's. On going  - Pt to demonstrate negative Bridge Test in order to show improved core strength for lumbar stabilization. Appropriate and Ongoing   - Pt to demonstrate ability to independently control and reduce their pain through posture positioning and mechanical movements throughout a typical day. Appropriate and Ongoing  - Patient will report ability to walk > 2 miles /s inc LB discomfort to demonstrate inc tolerance to  recreational tasks  Appropriate and Ongoing   - Patient lifting 20# kettle floor to plinth x 10 /s inc LB discomfort for inc tolerance to household chores  Appropriate and Ongoing      Plan   Plan of care Certification: 12/5/2024 to 01/31/25.    Continue POC.    Norm Ontiveros, PTA

## 2025-01-08 ENCOUNTER — CLINICAL SUPPORT (OUTPATIENT)
Dept: REHABILITATION | Facility: OTHER | Age: 70
End: 2025-01-08
Payer: MEDICARE

## 2025-01-08 DIAGNOSIS — M25.69 DECREASED RANGE OF MOTION OF TRUNK AND BACK: ICD-10-CM

## 2025-01-08 DIAGNOSIS — R29.898 DECREASED STRENGTH OF TRUNK AND BACK: Primary | ICD-10-CM

## 2025-01-08 PROCEDURE — 97110 THERAPEUTIC EXERCISES: CPT | Mod: HCNC,CQ

## 2025-01-08 PROCEDURE — 97112 NEUROMUSCULAR REEDUCATION: CPT | Mod: HCNC,CQ

## 2025-01-09 ENCOUNTER — OFFICE VISIT (OUTPATIENT)
Dept: SLEEP MEDICINE | Facility: CLINIC | Age: 70
End: 2025-01-09
Payer: MEDICARE

## 2025-01-09 VITALS
DIASTOLIC BLOOD PRESSURE: 76 MMHG | HEART RATE: 72 BPM | SYSTOLIC BLOOD PRESSURE: 152 MMHG | WEIGHT: 221 LBS | BODY MASS INDEX: 32.73 KG/M2 | HEIGHT: 69 IN

## 2025-01-09 DIAGNOSIS — R35.1 NOCTURIA: ICD-10-CM

## 2025-01-09 DIAGNOSIS — R09.81 NASAL CONGESTION: ICD-10-CM

## 2025-01-09 DIAGNOSIS — G47.33 OSA (OBSTRUCTIVE SLEEP APNEA): Primary | ICD-10-CM

## 2025-01-09 PROCEDURE — 1101F PT FALLS ASSESS-DOCD LE1/YR: CPT | Mod: HCNC,CPTII,S$GLB, | Performed by: PHYSICIAN ASSISTANT

## 2025-01-09 PROCEDURE — 3077F SYST BP >= 140 MM HG: CPT | Mod: HCNC,CPTII,S$GLB, | Performed by: PHYSICIAN ASSISTANT

## 2025-01-09 PROCEDURE — 99214 OFFICE O/P EST MOD 30 MIN: CPT | Mod: HCNC,S$GLB,, | Performed by: PHYSICIAN ASSISTANT

## 2025-01-09 PROCEDURE — 3008F BODY MASS INDEX DOCD: CPT | Mod: HCNC,CPTII,S$GLB, | Performed by: PHYSICIAN ASSISTANT

## 2025-01-09 PROCEDURE — 99999 PR PBB SHADOW E&M-EST. PATIENT-LVL III: CPT | Mod: PBBFAC,HCNC,, | Performed by: PHYSICIAN ASSISTANT

## 2025-01-09 PROCEDURE — 3288F FALL RISK ASSESSMENT DOCD: CPT | Mod: HCNC,CPTII,S$GLB, | Performed by: PHYSICIAN ASSISTANT

## 2025-01-09 PROCEDURE — 1126F AMNT PAIN NOTED NONE PRSNT: CPT | Mod: HCNC,CPTII,S$GLB, | Performed by: PHYSICIAN ASSISTANT

## 2025-01-09 PROCEDURE — 1159F MED LIST DOCD IN RCRD: CPT | Mod: HCNC,CPTII,S$GLB, | Performed by: PHYSICIAN ASSISTANT

## 2025-01-09 PROCEDURE — 1160F RVW MEDS BY RX/DR IN RCRD: CPT | Mod: HCNC,CPTII,S$GLB, | Performed by: PHYSICIAN ASSISTANT

## 2025-01-09 PROCEDURE — 3078F DIAST BP <80 MM HG: CPT | Mod: HCNC,CPTII,S$GLB, | Performed by: PHYSICIAN ASSISTANT

## 2025-01-09 RX ORDER — FLUTICASONE PROPIONATE 50 MCG
1 SPRAY, SUSPENSION (ML) NASAL DAILY
Qty: 9.9 ML | Refills: 3 | Status: SHIPPED | OUTPATIENT
Start: 2025-01-09

## 2025-01-09 NOTE — PROGRESS NOTES
Referred by No ref. provider found     ESTABLISHED PATIENT VISIT    Ascencion Owens  is a pleasant 69 y.o. male  with PMH significant for HTN, HLD, hx PE, hx DVT, cluster headaches, gout, BMI 32+, JOSEPH    Here today for: CPAP follow-up     PLAN last visit 7/22/24:   -recommend sleep testing   -PSG ordered  -discussed trial therapy if JOSEPH present and the patient is open to a trial of CPAP therapy  -discussed JOSEPH and PAP with patient in detail, including possible complications of untreated JOSEPH like heart attack/stroke  -advised on strict driving precautions; advised never to drive drowsy      Since last visit:   Reports using CPAP nightly with improvement in sx. States he feels more rested and sleep is more consolidate with less episodes of nocturia since starting on therapy. States he has also noticed improvement in memory, states his Bible study/prayer group have even commented on it. Feels good with machine overall. States mask interface and pressure settings are comfortable. Does report some nasal congestion overnight since starting therapy. Has tried adjusting humidity but sx persist. Has not yet tried any medications, etc.      PAP history   Problems    Mask FFM   Pressure 10-14cwp   DME HME   Machine age AIrSense 11 9/23/24   Download 1/9/25: 30/30 x 7hrs 38mins, 10-14cwp (10/11.2/12.2), leak (0/2.9/7.4), AHI 0.2         SLEEP SCHEDULE   Environment    Bed Time 10-11PM   Sleep Latency Within 30mins   Arousals 2-3   Nocturia 2-3 (6-7 x nightly prior to starting flomax)   Back to sleep Within 30mins or less   Wake time 5AM (lounges in bed until about 8AM)   Naps Naps when able in the afternoons   Work          Past Medical History:   Diagnosis Date    Acute bronchitis     Arthritis     CKD (chronic kidney disease)     Cluster headache     Dyslipidemia     Essential hypertension, benign     History of pulmonary embolism 11/09/2012    Hypercholesteremia     Obesity      Patient Active Problem List   Diagnosis  "   Essential hypertension    Hyperlipidemia, acquired    History of pulmonary embolism    Obesity    History of cluster headache    Body mass index 33.0-33.9, adult    Acute idiopathic gout involving toe of left foot    Pinguecula of both eyes    Psychophysiological insomnia    Nocturia    Central sleep apnea due to medical condition    Acute embolism and thrombosis of left popliteal vein    Hypersomnolence    JOSEPH (obstructive sleep apnea)    Decreased strength of trunk and back    Decreased range of motion of trunk and back       Current Outpatient Medications:     amLODIPine (NORVASC) 10 MG tablet, TAKE 1 TABLET EVERY DAY, Disp: 90 tablet, Rfl: 3    atorvastatin (LIPITOR) 20 MG tablet, Take 1 tablet (20 mg total) by mouth once daily., Disp: 90 tablet, Rfl: 3    meloxicam (MOBIC) 15 MG tablet, Take 1 tablet (15 mg total) by mouth daily as needed for Pain., Disp: 30 tablet, Rfl: 11    multivitamin (ONE DAILY MULTIVITAMIN) per tablet, Take 1 tablet by mouth once daily., Disp: , Rfl:     rivaroxaban (XARELTO) 20 mg Tab, Take 1 tablet (20 mg total) by mouth daily with dinner or evening meal., Disp: 30 tablet, Rfl: 11    tamsulosin (FLOMAX) 0.4 mg Cap, Take 1 capsule (0.4 mg total) by mouth once daily., Disp: 90 capsule, Rfl: 3    tiZANidine (ZANAFLEX) 4 MG tablet, TAKE 1 TO 2 TABLETS BY MOUTH NIGHTLY AS NEEDED FOR PAIN, Disp: 180 tablet, Rfl: 2    vitamin D (VITAMIN D3) 1000 units Tab, Take 5,000 Units by mouth once daily., Disp: , Rfl:      Vitals:    01/09/25 1123   BP: (!) 152/76   BP Location: Right arm   Patient Position: Sitting   Pulse: 72   Weight: 100.3 kg (221 lb 0.2 oz)   Height: 5' 9" (1.753 m)       Physical Exam:    GEN:   Well-appearing  Psych:  Appropriate affect, demonstrates insight  SKIN:  No rash on the face or bridge of the nose      LABS:   No results found for: "HGB", "CO2"        RECORDS REVIEWED:    SPLIT 8/20/24:  AHI 25, mask: full face arian hybrid mask Medium   Rec: min 10 (push to 11++) " ,max 14  Min Ramp: quick to 10    Previous sleep notes: 24    CPAP interrogation 25: 30/30 x 7hrs 38mins, 10-14cwp (10/11.2/12.2), leak (0/2.9/7.4), AHI 0.2    ASSESSMENT    [place] Sleepiness Scale:  Sitting and readin  Watching TV:    3  Passenger in a car x 1 hr:  1  Sitting quietly after lunch:  1  Lying down to rest in PM:  2  Sitting, inactive in public:  0  Sitting+ talking to someone:  0  Stopped in traffic:   0  Total        PROBLEM DESCRIPTION/ Sx on Presentation Interval Hx STATUS   JOSEPH   + loud snoring, + rare gasping arousals  denies witnessed apneas  + wakes feeling un-refreshed Good usage and efficiency; feels less brain fog since starting on CPAP controlled   Daytime Sx   + sleepiness when inactive   Naps most days in the afternoons  ESS  on intake (reviewed from 24) Feels more rested on CPAP improved   Insomnia   Trouble falling asleep: within 30mins  Arousals:         2-3 x nightly for nocturia   Hard to get back to sleep?: less than 30mins    Prior pertinent medications:  Current pertinent medications:  Sleep is more consolidated on CPAP improved   Nocturia   x 2-3 per sleep period 1-2 x nightly (also started on flomax) improved   Other issues:       PLAN      -using and benefiting from CPAP therapy  -continue CPAP nightly  -CPAP supplies ordered  -discussed JOSEPH and PAP with patient in detail, including possible complications of untreated JOSEPH like heart attack/stroke  -advised on strict driving precautions; advised never to drive drowsy  -discussed elevated blood pressure reading at today's visit; pt denies HA, changes in vision, CP, SOB  -stressed importance of close follow up with PCP for blood pressure monitoring and management  -advised on strict ER precautions   -discussed trial of flonase at bed time to see if this helps nasal congestion, patient is open     Advised on plan of care. Answered all patient questions. Patient verbalized understanding and voiced  agreement with plan of care.     RTC  12 months or as needed        The patient was given open opportunity to ask questions and/or express concerns about treatment plan. All questions/concerns were discussed.     Two patient identifiers used prior to evaluation.

## 2025-01-13 ENCOUNTER — CLINICAL SUPPORT (OUTPATIENT)
Dept: REHABILITATION | Facility: OTHER | Age: 70
End: 2025-01-13
Payer: MEDICARE

## 2025-01-13 DIAGNOSIS — R29.898 DECREASED STRENGTH OF TRUNK AND BACK: Primary | ICD-10-CM

## 2025-01-13 DIAGNOSIS — M25.69 DECREASED RANGE OF MOTION OF TRUNK AND BACK: ICD-10-CM

## 2025-01-13 PROCEDURE — 97530 THERAPEUTIC ACTIVITIES: CPT | Mod: HCNC

## 2025-01-13 PROCEDURE — 97112 NEUROMUSCULAR REEDUCATION: CPT | Mod: HCNC

## 2025-01-13 NOTE — PROGRESS NOTES
Physical Therapy Daily Progress  Note     Name: Ascencion Owens  Clinic Number: [id number]    Therapy Diagnosis:   Encounter Diagnoses   Name Primary?    Decreased strength of trunk and back Yes    Decreased range of motion of trunk and back        Physician: Milagro Patel PA-C    Visit Date: 1/13/2025    Physician Orders: PT Eval and Treat   Medical Diagnosis from Referral: M54.16 (ICD-10-CM) - Lumbar radiculopathy, chronic   Evaluation Date: 12/5/2024  Authorization Period Expiration: 12/31/24  Plan of Care Expiration: 1/31/25  Visit # / Visits authorized: 8/16  Progress Note Due: 2/13/25  FOTO: 2/ 3          Precautions: Standard     Time In: 9:00 AM   Time Out: 1000 AM   Total Billable Time: 30 minutes  Total Appointment Time (timed & untimed codes): 60 minutes    Subjective     Pt reports: he did his strengthening circuit at the gym for the first time since starting therapy and it went well he was only a little sore, but not pain, feels good this morning.     he was somewhat compliant with home exercise program given last session.   Response to previous treatment: good  Functional change: improvement in sharp pain and able to bend over    Pain: 0/10  Location: Mid to R side low back    Pts goals: I want to get stronger, be able to  my granddaughter   Objective     Updated at Progress Report Unless Specified    Lumbar Range of Motion:     %   Flexion Min loss       Extension Mod loss        Left Side Bending min loss   Right Side Bending Min loss   Left rotation    Min loss    Right Rotation    Min loss    *= pain       Lower Extremity Strength     Right LE   Left LE     Hip flexion: 4+/5 Hip flexion: 4+/5   Knee extension: 5/5 Knee extension: 5/5   Knee flexion: 5/5 Knee flexion: 5/5   Hip IR: 4+/5 Hip IR: 4+/5   Hip ER: 4+/5 Hip ER: 4+/5   Hip extension:  4-/5 Hip extension: 4-/5   Hip abduction: 4/5 inc hip flexor activation Hip abduction: 4/5 inc hip flexor activation   Hip adduction: 5/5 Hip  "adduction 5/5   Ankle dorsiflexion: 5/5 Ankle dorsiflexion: 5/5   Ankle plantarflexion: 5/5 Ankle plantarflexion: 5/5          Treatment     Ascencion received the following manual therapy techniques for 0 minutes:   NA today    Prone unilateral P/As lumbar spine grade 2/3  Prone L knee FL stretch    Ascencion received therapeutic exercises to develop strength, endurance, ROM, flexibility, posture, and core stabilization for 30 minutes including:    Recumbent Bike x 5' for joint nutrition  LTR  x 10 /c wide stance for hip mobility  Supine Hip flexor stretch  x 1' sec each side  PPT x 5 + 10 with march 90/90 holds 3 x 20"  Prone press ups 2  x 10   Bridges 2 x 10 3 second holds  Torso rotation machine x 5 reps ROM and 20# x 15 reps each side  2 rounds   10 sit<>stands 10#   Bilateral 15# farmers carry x 1 lap (20kb 2nd round)   Front carry 1 lap 15kb (20kb 2nd round)    NP  Seated thoracic extension 15 x 5"  Standing lumbar ext x 15  Standing glut med RTB 2 x 10 3 second holds    Ascencion participated in dynamic functional therapeutic activities to improve functional performance for 10 minutes, including:    Objective measures and reassessment    Ascencion participated in neuromuscular re-education activities to improve: Balance, Coordination, and Posture for 15 minutes. The following activities were included:  - Supine TA + SLR 2 x 10 each side  -MedX lumbar machine 24# x 5 warm, (0-50) +60# x +20  - Supine BKFO B TB x 15 reps 3 second holds + TA - np  + March with KB hold 20# 2 x 10   +lunge to 6 in box x 10, lunge to 4 in step x 10 ea    NP  -paloff press GTB 15 x 5"  -Reverse hyper 3 x 5 reps    Ascencion received ice pack for 5 minutes to low back in Z lie.    Home Exercises and Education Provided     Education provided:   - DOMS  - Progress towards goals     Written Home Exercises Provided: Patient instructed to cont prior HEP.  Exercises were reviewed and Ascencion was able to demonstrate them prior to the end of the " session.  Ascencion demonstrated good  understanding of the HEP provided.   .   See EMR under Patient Instructions for exercises provided 12/5/2024.  Assessment   Patient presents without c/o pain. Reassessment performed with pt demo gains in lumbar spine ROM, strength of core and B LE, and improved tolerance to ADL's and functional mobility activities. He continues to have deficits in ROM and strength, and will continue to benefit form skilled therapy to address described deficits and decrease pain. Continue to progress as tolerated.       Ascencion is progressing well towards his goals and there are no updates to goals at this time. Pt prognosis is Good.     Pt will continue to benefit from skilled outpatient physical therapy to address the deficits listed in the problem list on initial evaluation provide pt/family education and to maximize pt's level of independence in the home and community environment.     Anticipated Barriers for therapy: none    Pt's spiritual, cultural and educational needs considered and pt agreeable to plan of care and goals.    GOALS: Short Term Goals:  4 weeks  - Report decreased in pain at worse less than  <   / =  4  /10  to increase tolerance for functional activities.Appropriate and Ongoing   - Patient lifting 10# kettle floor to plinth x 10 /s inc LB discomfort for inc tolerance to household chores  Appropriate and Ongoing   - Patient verbalize good understanding of importance of proper posture in resisted exercise, functional activities & ADL's in order to help reduce postural strain, promote proper breathing. Appropriate and Ongoing   - Pt to tolerate HEP to improve ROM and independence with ADL's.Appropriate and Ongoing         Long Term Goals: 8 weeks  - Report decreased in pain at worse less than  <   / =  2  /10  to increase tolerance for functional mobility.  Appropriate and Ongoing   - Increased B hip ext MMT to 4+/5 grade to increase tolerance for ADL and work  activities.Appropriate and Ongoing   - Pt will report at **TBD**% or less limitation on FOTO Lumbar spine survey  to demonstrate decrease in disability and improvement in back pain.Appropriate and Ongoing   - Pt to be Independent with HEP to improve ROM and independence with ADL's. On going  - Pt to demonstrate negative Bridge Test in order to show improved core strength for lumbar stabilization. Appropriate and Ongoing   - Pt to demonstrate ability to independently control and reduce their pain through posture positioning and mechanical movements throughout a typical day. Appropriate and Ongoing  - Patient will report ability to walk > 2 miles /s inc LB discomfort to demonstrate inc tolerance to recreational tasks  Appropriate and Ongoing   - Patient lifting 20# kettle floor to plinth x 10 /s inc LB discomfort for inc tolerance to household chores  Appropriate and Ongoing      Plan   Plan of care Certification: 12/5/2024 to 01/31/25.    Continue POC.    Harvey Campbell, PT

## 2025-01-15 ENCOUNTER — CLINICAL SUPPORT (OUTPATIENT)
Dept: REHABILITATION | Facility: OTHER | Age: 70
End: 2025-01-15
Payer: MEDICARE

## 2025-01-15 DIAGNOSIS — R29.898 DECREASED STRENGTH OF TRUNK AND BACK: Primary | ICD-10-CM

## 2025-01-15 DIAGNOSIS — M25.69 DECREASED RANGE OF MOTION OF TRUNK AND BACK: ICD-10-CM

## 2025-01-15 PROCEDURE — 97112 NEUROMUSCULAR REEDUCATION: CPT | Mod: HCNC,CQ

## 2025-01-15 PROCEDURE — 97110 THERAPEUTIC EXERCISES: CPT | Mod: HCNC,CQ

## 2025-01-15 PROCEDURE — 97530 THERAPEUTIC ACTIVITIES: CPT | Mod: HCNC,CQ

## 2025-01-15 NOTE — PROGRESS NOTES
Physical Therapy Daily Progress  Note     Name: Ascencion Owens  Clinic Number: [id number]    Therapy Diagnosis:   Encounter Diagnoses   Name Primary?    Decreased strength of trunk and back Yes    Decreased range of motion of trunk and back        Physician: Milagro Patel PA-C    Visit Date: 1/15/2025    Physician Orders: PT Eval and Treat   Medical Diagnosis from Referral: M54.16 (ICD-10-CM) - Lumbar radiculopathy, chronic   Evaluation Date: 12/5/2024  Authorization Period Expiration: 12/31/24  Plan of Care Expiration: 1/31/25  Visit # / Visits authorized: 9/16  Progress Note Due: 2/13/25  FOTO: 2/ 3          Precautions: Standard     Time In: 1000  Time Out: 1100  Total Billable Time: 60 minutes  Total Appointment Time (timed & untimed codes): 60 minutes    Subjective     Pt reports: he is doing well, feeling some stiffness in back    he was somewhat compliant with home exercise program given last session.   Response to previous treatment: good  Functional change: improvement in sharp pain and able to bend over    Pain: 0/10  Location: Mid to R side low back    Pts goals: I want to get stronger, be able to  my granddaughter   Objective     Updated at Progress Report Unless Specified    Lumbar Range of Motion:     %   Flexion Min loss       Extension Mod loss        Left Side Bending min loss   Right Side Bending Min loss   Left rotation    Min loss    Right Rotation    Min loss    *= pain       Lower Extremity Strength     Right LE   Left LE     Hip flexion: 4+/5 Hip flexion: 4+/5   Knee extension: 5/5 Knee extension: 5/5   Knee flexion: 5/5 Knee flexion: 5/5   Hip IR: 4+/5 Hip IR: 4+/5   Hip ER: 4+/5 Hip ER: 4+/5   Hip extension:  4-/5 Hip extension: 4-/5   Hip abduction: 4/5 inc hip flexor activation Hip abduction: 4/5 inc hip flexor activation   Hip adduction: 5/5 Hip adduction 5/5   Ankle dorsiflexion: 5/5 Ankle dorsiflexion: 5/5   Ankle plantarflexion: 5/5 Ankle plantarflexion: 5/5     "      Treatment     Ascencion received the following manual therapy techniques for 0 minutes:   NA today    Prone unilateral P/As lumbar spine grade 2/3  Prone L knee FL stretch    Ascencion received therapeutic exercises to develop strength, endurance, ROM, flexibility, posture, and core stabilization for 30 minutes including:    Recumbent Bike x 5' for joint nutrition  LTR  x 10 /c wide stance for hip mobility  Supine Hip flexor stretch  x 1' sec each side  PPT x 5 + 10 with march  90/90 holds 3 x 20"  Prone press ups 2  x 10   Bridges 2 x 10 3 second holds +GTB  Torso rotation machine x 5 reps ROM and 20# x 15 reps each side     2x10 sit<>stands 20#   Bilateral 15# farmers carry x 1 lap (20kb 2nd round)   Front carry 1 lap 15kb (20kb 2nd round)    NP  Seated thoracic extension 15 x 5"  Standing lumbar ext x 15  Standing glut med RTB 2 x 10 3 second holds    Ascencion participated in dynamic functional therapeutic activities to improve functional performance for 10 minutes, including:    Objective measures and reassessment    Ascencion participated in neuromuscular re-education activities to improve: Balance, Coordination, and Posture for 15 minutes. The following activities were included:  - Supine TA + SLR 2 x 10 each side  -MedX lumbar machine 24# x 5 warm, (0-50) +60# x +20  - Supine BKFO B TB x 15 reps 3 second holds + TA - np  + March with KB hold 20# 2 x 10 unilateral hold   +lunge to 6 in box x 10, lunge to 4 in step x 10 ea    NP  -paloff press GTB 15 x 5"  -Reverse hyper 3 x 5 reps    Ascencion received ice pack for 5 minutes to low back in Z lie.    Home Exercises and Education Provided     Education provided:   - DOMS  - Progress towards goals     Written Home Exercises Provided: Patient instructed to cont prior HEP.  Exercises were reviewed and Ascencion was able to demonstrate them prior to the end of the session.  Ascencion demonstrated good  understanding of the HEP provided.   .   See EMR under Patient " Instructions for exercises provided 12/5/2024.  Assessment   Patient presents without c/o pain. Good tolerance to all therex performed today. Focus on core stability and strengthening with functional movements. Working on floor transfers as well with focus on balance and strength with lunging        Ascencion is progressing well towards his goals and there are no updates to goals at this time. Pt prognosis is Good.     Pt will continue to benefit from skilled outpatient physical therapy to address the deficits listed in the problem list on initial evaluation provide pt/family education and to maximize pt's level of independence in the home and community environment.     Anticipated Barriers for therapy: none    Pt's spiritual, cultural and educational needs considered and pt agreeable to plan of care and goals.    GOALS: Short Term Goals:  4 weeks  - Report decreased in pain at worse less than  <   / =  4  /10  to increase tolerance for functional activities.Appropriate and Ongoing   - Patient lifting 10# kettle floor to plinth x 10 /s inc LB discomfort for inc tolerance to household chores  Appropriate and Ongoing   - Patient verbalize good understanding of importance of proper posture in resisted exercise, functional activities & ADL's in order to help reduce postural strain, promote proper breathing. Appropriate and Ongoing   - Pt to tolerate HEP to improve ROM and independence with ADL's.Appropriate and Ongoing         Long Term Goals: 8 weeks  - Report decreased in pain at worse less than  <   / =  2  /10  to increase tolerance for functional mobility.  Appropriate and Ongoing   - Increased B hip ext MMT to 4+/5 grade to increase tolerance for ADL and work activities.Appropriate and Ongoing   - Pt will report at **TBD**% or less limitation on FOTO Lumbar spine survey  to demonstrate decrease in disability and improvement in back pain.Appropriate and Ongoing   - Pt to be Independent with HEP to improve ROM and  independence with ADL's. On going  - Pt to demonstrate negative Bridge Test in order to show improved core strength for lumbar stabilization. Appropriate and Ongoing   - Pt to demonstrate ability to independently control and reduce their pain through posture positioning and mechanical movements throughout a typical day. Appropriate and Ongoing  - Patient will report ability to walk > 2 miles /s inc LB discomfort to demonstrate inc tolerance to recreational tasks  Appropriate and Ongoing   - Patient lifting 20# kettle floor to plinth x 10 /s inc LB discomfort for inc tolerance to household chores  Appropriate and Ongoing      Plan   Plan of care Certification: 12/5/2024 to 01/31/25.    Continue POC.    Alfredo Patel, PTA

## 2025-01-28 NOTE — PROGRESS NOTES
Physical Therapy Updated Plan of Care/Daily Note     Name: Ascencion Owens  Clinic Number: [id number]    Therapy Diagnosis:   Encounter Diagnoses   Name Primary?    Decreased strength of trunk and back Yes    Decreased range of motion of trunk and back      Physician: Milagro Patel PA-C    Visit Date: 1/29/2025    Physician Orders: PT Eval and Treat   Medical Diagnosis from Referral: M54.16 (ICD-10-CM) - Lumbar radiculopathy, chronic   Evaluation Date: 12/5/2024  Authorization Period Expiration: 12/31/25  Plan of Care Expiration: 1/31/25- updated to 3/26/25  Visit # / Visits authorized: 4/10  Progress Note Due: 2/13/25  FOTO: 3/ 3          Precautions: Standard     Time In: 2:30 pm  Time Out: 3:30 pm  Total Billable Time: 30 minutes  Total Appointment Time (timed & untimed codes): 60 minutes    Subjective     Pt reports: that overall he is feeling better with less increase in symptoms. He feels like he is at 90% of his normal.   he was somewhat compliant with home exercise program given last session.   Response to previous treatment: good  Functional change: improvement in sharp pain and able to bend over    Pain: 0/10  Location: Mid to R side low back    Pts goals: I want to get stronger, be able to  my granddaughter   Objective     Updated at Progress Report Unless Specified    Lumbar Range of Motion:     %   Flexion WNL      Extension Min loss        Left Side Bending WNL   Right Side Bending WNL   Left rotation    WNL   Right Rotation    WNL    *= pain    Lower Extremity Strength     Right LE   Left LE     Hip flexion: 4+/5 Hip flexion: 4+/5   Knee extension: 5/5 Knee extension: 5/5   Knee flexion: 5/5 Knee flexion: 5/5   Hip IR: 4+/5 Hip IR: 4+/5   Hip ER: 4+/5 Hip ER: 4+/5   Hip extension:  4/5 Hip extension: 4/5   Hip abduction: 4/5  Hip abduction: 4/5    Hip adduction: 5/5 Hip adduction 5/5   Ankle dorsiflexion: 5/5 Ankle dorsiflexion: 5/5   Ankle plantarflexion: 5/5 Ankle plantarflexion: 5/5     "      Treatment     Ascencion received the following manual therapy techniques for 0 minutes:   NA today    Ascencion received therapeutic exercises to develop strength, endurance, ROM, flexibility, posture, and core stabilization for 40 minutes including:  Recumbent Bike x 6' for joint nutrition  LTR  x 10 /c wide stance for hip mobility  Supine Hip flexor stretch  x 1' sec each side  MedX lumbar machine 30# x 5 warm; (0-50) 64# x +20  90/90 holds 3 x 20"  Bridges 2 x 10 3 second holds +GTB  Torso rotation machine x 5 reps ROM and 20# x 20 reps each side  3 rounds   20# KB  from 6 inch box (turned on side) x 10 reps   Front carry 1 lap 30 kb    NP  Standing glut med RTB 2 x 10 3 second holds  2x10 sit<>stands 20#  Bilateral 15# farmers carry x 1 lap (20kb 2nd round)    Ascencion participated in dynamic functional therapeutic activities to improve functional performance for 15 minutes, including:  Objective measures and reassessment    Ascencion participated in neuromuscular re-education activities to improve: Balance, Coordination, and Posture for 0 minutes. The following activities were included:  NA today March with KB hold 20# 2 x 10 unilateral hold   lunge to 6 in box x 10, lunge to 4 in step x 10 ea    NP  Reverse hyper 3 x 5 reps    Ascencion received ice pack for 5 minutes to low back in Z lie.    Home Exercises and Education Provided     Education provided:   - DOMS  - Progress towards goals     Written Home Exercises Provided:   Exercises were reviewed and Ascencion was able to demonstrate them prior to the end of the session.  Ascencion demonstrated good  understanding of the HEP provided.   .   See EMR under Patient Instructions for exercises provided prior visit  Assessment   Patient has demonstrated significant improvement in pain/adverse symptoms, improvement in lumbar and hip mobility, improvement in functional strength, and improvement in tolerance to activities. Patient is starting to get back into his " current work out routine,but still hesitant with more bending and specific core and back activities. Patient is still not able to perform higher level ADLs and iADLs that involve repetitive bending and lifting without increase in symptoms as well as not confident performing this as well.    Ascencion is progressing well towards his goals and goals were updated on 1/29/25. Pt prognosis is Good.     Pt will continue to benefit from skilled outpatient physical therapy to address the deficits listed in the problem list on initial evaluation provide pt/family education and to maximize pt's level of independence in the home and community environment.     Anticipated Barriers for therapy: none    Pt's spiritual, cultural and educational needs considered and pt agreeable to plan of care and goals.    GOALS: Short Term Goals:  4 weeks  - Report decreased in pain at worse less than  <   / =  4  /10  to increase tolerance for functional activities. MET  - Patient lifting 10# kettle floor to plinth x 10 /s inc LB discomfort for inc tolerance to household chores  MET  - Patient verbalize good understanding of importance of proper posture in resisted exercise, functional activities & ADL's in order to help reduce postural strain, promote proper breathing. MET  - Pt to tolerate HEP to improve ROM and independence with ADL's. MET        Long Term Goals: 8 weeks  - Report decreased in pain at worse less than  <   / =  2  /10  to increase tolerance for functional mobility.  MET   - Increased B hip ext MMT to 4+/5 grade to increase tolerance for ADL and work activities.Appropriate and Ongoing   - Pt will report at 71 or greater score on FOTO Lumbar spine survey  to demonstrate decrease in disability and improvement in back pain.Appropriate and Ongoing   - Pt to be Independent with HEP to improve ROM and independence with ADL's. On going  - Pt to demonstrate negative Bridge Test in order to show improved core strength for lumbar  stabilization. MET  - Pt to demonstrate ability to independently control and reduce their pain through posture positioning and mechanical movements throughout a typical day. Appropriate and Ongoing  - Patient will report ability to walk > 2 miles /s inc LB discomfort to demonstrate inc tolerance to recreational tasks  Appropriate and Ongoing   - Patient lifting 20# kettle floor to plinth x 10 /s inc LB discomfort for inc tolerance to household chores  Appropriate and Ongoing      Plan   Plan of care Certification: 12/5/2024 to 01/31/25- updated to 3/26/25    Outpatient Physical Therapy 1 times weekly for 6-8 weeks to include the following interventions: Manual Therapy, Moist Heat/ Ice, Neuromuscular Re-ed, Patient Education, Self Care, Therapeutic Activities, and Therapeutic Exercise. Dry gianfranco Mulligan-Olivia Griffin, PT

## 2025-01-29 ENCOUNTER — CLINICAL SUPPORT (OUTPATIENT)
Dept: REHABILITATION | Facility: OTHER | Age: 70
End: 2025-01-29
Payer: MEDICARE

## 2025-01-29 DIAGNOSIS — M25.69 DECREASED RANGE OF MOTION OF TRUNK AND BACK: ICD-10-CM

## 2025-01-29 DIAGNOSIS — R29.898 DECREASED STRENGTH OF TRUNK AND BACK: Primary | ICD-10-CM

## 2025-01-29 PROCEDURE — 97530 THERAPEUTIC ACTIVITIES: CPT | Mod: HCNC

## 2025-01-29 PROCEDURE — 97110 THERAPEUTIC EXERCISES: CPT | Mod: HCNC

## 2025-01-30 ENCOUNTER — PATIENT MESSAGE (OUTPATIENT)
Dept: SLEEP MEDICINE | Facility: CLINIC | Age: 70
End: 2025-01-30
Payer: MEDICARE

## 2025-02-05 NOTE — PROGRESS NOTES
"  Physical Therapy Daily Note     Name: Ascencion Owens  Clinic Number: [id number]    Therapy Diagnosis:   Encounter Diagnoses   Name Primary?    Decreased strength of trunk and back Yes    Decreased range of motion of trunk and back          Physician: Milagro Patel PA-C    Visit Date: 2/6/2025    Physician Orders: PT Eval and Treat   Medical Diagnosis from Referral: M54.16 (ICD-10-CM) - Lumbar radiculopathy, chronic   Evaluation Date: 12/5/2024  Authorization Period Expiration: 12/31/25  Plan of Care Expiration: 1updated to 3/26/25  Visit # / Visits authorized: 11/10  (pending) Progress Note Due: 2/13/25  FOTO: 3/ 3          Precautions: Standard     Time In: 10:00 am  Time Out: 11:00 am  Total Billable Time: 60 minutes 1:1   Total Appointment Time (timed & untimed codes): 60 minutes    Subjective     Pt reports: no flare ups since last visit, however, notes mild stiffness in LB /p last visit.  Patient attribute this to the simulated grandchild lifting "something I hadn't done".  Patient agree to attempting again today.  Patient report going directly from last session to walking in park (1.8 miles) /s sig inc discomfort.  Patient report returning to prior gym workout intensity (however lower weights/resistance) including elliptical and circuit training.    During tx patient verbalize still level of concern for forward bending noting memory of past discomfort.        he was somewhat compliant with home exercise program given last session.   Response to previous treatment: well /c no c/o of excess discomfort   Functional change: improved exercise tolerance /c gym visits    Pain:  1/10 "stiffness" from last visit  Location: Mid to R side low back    Pts goals: I want to get stronger, be able to  my granddaughter     Objective     Updated at Progress Report Unless Specified        Treatment     Ascencion received the following manual therapy techniques for 0 minutes:   NA today    Ascencion received therapeutic " "exercises to develop strength, endurance, ROM, flexibility, posture, and core stabilization for 50 minutes including:    Recumbent Bike x 6' for joint nutrition    LTR  x 10 /c wide stance for hip mobility  Supine Hip flexor stretch  x 1' sec each side + strap for inc intensity  Prone press ups x 20   Quad   Cat/cow x 15 tactile cue for dec WS, mid range     MedX Torso rotation machine  26# x 20 reps each side 4 ROM 3/10 RPE  MedX lumbar machine 68# x +20  (0-50 ROM) 4/10 RPE    NP  90/90 holds 3 x 20"  Bridges 2 x 10 3 second holds +GTB  Standing glut med RTB 2 x 10 3 second holds  2x10 sit<>stands 20#  Bilateral 15# farmers carry x 1 lap (20kb 2nd round)    Ascencion participated in dynamic functional therapeutic activities to improve functional performance for 10 minutes, including:    To simulate grandchild :  20# KB step to and  from 6 inch box turned on tall side x 10 reps, turned on short side x 10   L foot on step stool tying shoe 10 sec   Front carry 2 lap 30 KB lifted from 6 in box turned on short side     NP:   March with KB hold 20# 2 x 10 unilateral hold   lunge to 6 in box x 10, lunge to 4 in step x 10 ea    Ascencion participated in neuromuscular re-education activities to improve: Balance, Coordination, and Posture for 0 minutes. The following activities were included:  NA today    NP  Reverse hyper 3 x 5 reps    Ascencion received ice pack for 5 minutes to low back in Z lie.    Home Exercises and Education Provided     Education provided:   - Application of Gayathri Exertion Scale     Written Home Exercises Provided:   Exercises were reviewed and Ascencion was able to demonstrate them prior to the end of the session.  Ascencion demonstrated good  understanding of the HEP provided.   .   See EMR under Patient Instructions for exercises provided prior visit  Assessment     Patient subjective report indicate continue progression of exercise tolerance.  Patient encouraged to continue gym visits.  Tx " "continue /c paraspinal strengthening and added education on use of Gayathri Scale in gym to avoid over exertion and dec concern for "what amount of weight do I use".  However, verbiage regarding flexion motion indicate continue fear component /c self limitation.  Patient educated on importance of mobile spine for functionality including maintaining HEP for mobility.    Considering patient note mild stiffness /p last visit from functional exercises, tx return to performance to promote inc exercise tolerance, endurance and to improve patient self efficacy especially /c bending/lifting.  Plan f/u next visit to assess activity tolerance to demonstrate improved functionality.   Patient /c upcoming out of town trip including extended driving, walking and performing grandchild lift.  Plan 2 visits before trip and 1 f/u /c anticipated discharge.       Ascencion is progressing well towards his goals and goals were updated on 1/29/25. Pt prognosis is Good.     Pt will continue to benefit from skilled outpatient physical therapy to address the deficits listed in the problem list on initial evaluation provide pt/family education and to maximize pt's level of independence in the home and community environment.     Anticipated Barriers for therapy: none    Pt's spiritual, cultural and educational needs considered and pt agreeable to plan of care and goals.    GOALS: Short Term Goals:  4 weeks  - Report decreased in pain at worse less than  <   / =  4  /10  to increase tolerance for functional activities. MET  - Patient lifting 10# kettle floor to plinth x 10 /s inc LB discomfort for inc tolerance to household chores  MET  - Patient verbalize good understanding of importance of proper posture in resisted exercise, functional activities & ADL's in order to help reduce postural strain, promote proper breathing. MET  - Pt to tolerate HEP to improve ROM and independence with ADL's. MET        Long Term Goals: 8 weeks  - Report decreased in " pain at worse less than  <   / =  2  /10  to increase tolerance for functional mobility.  MET   - Increased B hip ext MMT to 4+/5 grade to increase tolerance for ADL and work activities.Appropriate and Ongoing   - Pt will report at 71 or greater score on FOTO Lumbar spine survey  to demonstrate decrease in disability and improvement in back pain.Appropriate and Ongoing   - Pt to be Independent with HEP to improve ROM and independence with ADL's. On going  - Pt to demonstrate negative Bridge Test in order to show improved core strength for lumbar stabilization. MET  - Pt to demonstrate ability to independently control and reduce their pain through posture positioning and mechanical movements throughout a typical day. Appropriate and Ongoing  - Patient will report ability to walk > 2 miles /s inc LB discomfort to demonstrate inc tolerance to recreational tasks  Appropriate and Ongoing   - Patient lifting 20# kettle floor to plinth x 10 /s inc LB discomfort for inc tolerance to household chores  Appropriate and Ongoing      Plan   Plan of care Certification: updated to 3/26/25    Outpatient Physical Therapy 1 times weekly for 6-8 weeks to include the following interventions: Manual Therapy, Moist Heat/ Ice, Neuromuscular Re-ed, Patient Education, Self Care, Therapeutic Activities, and Therapeutic Exercise. Mark Lucas, PT

## 2025-02-06 ENCOUNTER — CLINICAL SUPPORT (OUTPATIENT)
Dept: REHABILITATION | Facility: OTHER | Age: 70
End: 2025-02-06
Payer: MEDICARE

## 2025-02-06 ENCOUNTER — PATIENT MESSAGE (OUTPATIENT)
Dept: HEMATOLOGY/ONCOLOGY | Facility: CLINIC | Age: 70
End: 2025-02-06
Payer: MEDICARE

## 2025-02-06 DIAGNOSIS — M25.69 DECREASED RANGE OF MOTION OF TRUNK AND BACK: ICD-10-CM

## 2025-02-06 DIAGNOSIS — R29.898 DECREASED STRENGTH OF TRUNK AND BACK: Primary | ICD-10-CM

## 2025-02-06 PROCEDURE — 97110 THERAPEUTIC EXERCISES: CPT | Mod: HCNC

## 2025-02-06 PROCEDURE — 97530 THERAPEUTIC ACTIVITIES: CPT | Mod: HCNC

## 2025-02-13 ENCOUNTER — CLINICAL SUPPORT (OUTPATIENT)
Dept: REHABILITATION | Facility: OTHER | Age: 70
End: 2025-02-13
Payer: MEDICARE

## 2025-02-13 DIAGNOSIS — M25.69 DECREASED RANGE OF MOTION OF TRUNK AND BACK: ICD-10-CM

## 2025-02-13 DIAGNOSIS — R29.898 DECREASED STRENGTH OF TRUNK AND BACK: Primary | ICD-10-CM

## 2025-02-13 PROCEDURE — 97110 THERAPEUTIC EXERCISES: CPT | Mod: HCNC,CQ

## 2025-02-13 PROCEDURE — 97112 NEUROMUSCULAR REEDUCATION: CPT | Mod: HCNC,CQ

## 2025-02-13 NOTE — PROGRESS NOTES
"  Physical Therapy Daily Note     Name: Ascencion Owens  Clinic Number: [id number]    Therapy Diagnosis:   Encounter Diagnoses   Name Primary?    Decreased strength of trunk and back Yes    Decreased range of motion of trunk and back          Physician: Milagro Patel PA-C    Visit Date: 2/13/2025    Physician Orders: PT Eval and Treat   Medical Diagnosis from Referral: M54.16 (ICD-10-CM) - Lumbar radiculopathy, chronic   Evaluation Date: 12/5/2024  Authorization Period Expiration: 12/31/25  Plan of Care Expiration: 1updated to 3/26/25  Visit # / Visits authorized: 12/10  (pending) Progress Note Due: 2/13/25  FOTO: 3/ 3          Precautions: Standard     Time In: 1000  Time Out: 1100  Total Billable Time: 60 minutes 1:1   Total Appointment Time (timed & untimed codes): 60 minutes    Subjective     Pt reports:he is doing well. Has been going to the gym regularly       he was somewhat compliant with home exercise program given last session.   Response to previous treatment: well /c no c/o of excess discomfort   Functional change: improved exercise tolerance /c gym visits    Pain:  1/10 "stiffness" from last visit  Location: Mid to R side low back    Pts goals: I want to get stronger, be able to  my granddaughter     Objective         Treatment     Ascencion received the following manual therapy techniques for 0 minutes:   NA today    Ascencion received therapeutic exercises to develop strength, endurance, ROM, flexibility, posture, and core stabilization for 50 minutes including:    Recumbent Bike x 6' for joint nutrition    LTR  x 10 /c wide stance for hip mobility  Supine Hip flexor stretch  x 1' sec each side + strap for inc intensity  Prone press ups x 20   Quad   Cat/cow x 15 tactile cue for dec WS, mid range     MedX Torso rotation machine  26# x 20 reps each side 4 ROM 3/10 RPE  MedX lumbar machine 68# x +20  (0-50 ROM) 4/10 RPE    NP  90/90 holds 3 x 20"  Bridges 2 x 10 3 second holds +GTB  Standing glut " med RTB 2 x 10 3 second holds  2x10 sit<>stands 20#  Bilateral 15# farmers carry x 1 lap (20kb 2nd round)    Ascencion participated in dynamic functional therapeutic activities to improve functional performance for 10 minutes, including:    To simulate grandchild :  20# KB step to and  from 6 inch box turned on tall side x 10 reps, turned on short side x 10   L foot on step stool tying shoe 10 sec   Front carry 2 lap 30 KB lifted from 6 in box turned on short side     NP:   March with KB hold 20# 2 x 10 unilateral hold   lunge to 6 in box x 10, lunge to 4 in step x 10 ea    Ascencion participated in neuromuscular re-education activities to improve: Balance, Coordination, and Posture for 0 minutes. The following activities were included:  NA today    NP  Reverse hyper 3 x 5 reps    Ascencion received ice pack for 5 minutes to low back in Z lie.    Home Exercises and Education Provided     Education provided:   - Application of Gayathri Exertion Scale     Written Home Exercises Provided:   Exercises were reviewed and Ascencion was able to demonstrate them prior to the end of the session.  Ascencion demonstrated good  understanding of the HEP provided.   .   See EMR under Patient Instructions for exercises provided prior visit  Assessment     Patient presents for follow up visit reporting improvement is overall function with progressions at home and the gym. Pt shows noted strength improvements in LE evident with lunge lowering to practice floor transfers       Ascencion is progressing well towards his goals and goals were updated on 1/29/25. Pt prognosis is Good.     Pt will continue to benefit from skilled outpatient physical therapy to address the deficits listed in the problem list on initial evaluation provide pt/family education and to maximize pt's level of independence in the home and community environment.     Anticipated Barriers for therapy: none    Pt's spiritual, cultural and educational needs considered and  pt agreeable to plan of care and goals.    GOALS: Short Term Goals:  4 weeks  - Report decreased in pain at worse less than  <   / =  4  /10  to increase tolerance for functional activities. MET  - Patient lifting 10# kettle floor to plinth x 10 /s inc LB discomfort for inc tolerance to household chores  MET  - Patient verbalize good understanding of importance of proper posture in resisted exercise, functional activities & ADL's in order to help reduce postural strain, promote proper breathing. MET  - Pt to tolerate HEP to improve ROM and independence with ADL's. MET        Long Term Goals: 8 weeks  - Report decreased in pain at worse less than  <   / =  2  /10  to increase tolerance for functional mobility.  MET   - Increased B hip ext MMT to 4+/5 grade to increase tolerance for ADL and work activities.Appropriate and Ongoing   - Pt will report at 71 or greater score on FOTO Lumbar spine survey  to demonstrate decrease in disability and improvement in back pain.Appropriate and Ongoing   - Pt to be Independent with HEP to improve ROM and independence with ADL's. On going  - Pt to demonstrate negative Bridge Test in order to show improved core strength for lumbar stabilization. MET  - Pt to demonstrate ability to independently control and reduce their pain through posture positioning and mechanical movements throughout a typical day. Appropriate and Ongoing  - Patient will report ability to walk > 2 miles /s inc LB discomfort to demonstrate inc tolerance to recreational tasks  Appropriate and Ongoing   - Patient lifting 20# kettle floor to plinth x 10 /s inc LB discomfort for inc tolerance to household chores  Appropriate and Ongoing      Plan   Plan of care Certification: updated to 3/26/25    Outpatient Physical Therapy 1 times weekly for 6-8 weeks to include the following interventions: Manual Therapy, Moist Heat/ Ice, Neuromuscular Re-ed, Patient Education, Self Care, Therapeutic Activities, and Therapeutic  Exercise. Dry gianfranco Patel, PTA

## 2025-02-17 ENCOUNTER — PATIENT MESSAGE (OUTPATIENT)
Dept: FAMILY MEDICINE | Facility: CLINIC | Age: 70
End: 2025-02-17
Payer: MEDICARE

## 2025-02-17 DIAGNOSIS — I10 ESSENTIAL HYPERTENSION: ICD-10-CM

## 2025-02-17 RX ORDER — AMLODIPINE BESYLATE 10 MG/1
10 TABLET ORAL DAILY
Qty: 90 TABLET | Refills: 3 | Status: SHIPPED | OUTPATIENT
Start: 2025-02-17

## 2025-02-17 NOTE — TELEPHONE ENCOUNTER
Care Due:                  Date            Visit Type   Department     Provider  --------------------------------------------------------------------------------                                EP -                              PRIMARY      [facility]  Last Visit: 09-      CARE ([facility])   MEDICINE       Brittnee Hoskins  Next Visit: None Scheduled  None         None Found                                                            Last  Test          Frequency    Reason                     Performed    Due Date  --------------------------------------------------------------------------------    CBC.........  12 months..  meloxicam................  05- 05-    CMP.........  12 months..  atorvastatin, meloxicam..  05- 05-    Lipid Panel.  12 months..  atorvastatin.............  03- 03-    [facility] Embedded Care Due Messages. Reference number: [id number].   2/17/2025 9:27:22 AM CST

## 2025-02-17 NOTE — TELEPHONE ENCOUNTER
----- Message from Juan sent at 2/17/2025  8:55 AM CST -----  Regarding: self  Type: RX Refill RequestWho called:selfHave you contacted your pharmacy:yesRefill or New Rx:refillRX name and strength:amLODIPine (NORVASC) 10 MG tabletPreferred pharmacy and phone number:[id number] - [place], [place] - [address] [facility] [id number] [place] [address]Osskg: [phone] Fax: [phone] provider:Ji the patient rather a call back or a response via My [facility] [place] call back number:[phone] information:

## 2025-02-19 DIAGNOSIS — I10 ESSENTIAL HYPERTENSION: ICD-10-CM

## 2025-02-24 DIAGNOSIS — Z00.00 ENCOUNTER FOR MEDICARE ANNUAL WELLNESS EXAM: ICD-10-CM

## 2025-02-26 ENCOUNTER — PATIENT MESSAGE (OUTPATIENT)
Dept: FAMILY MEDICINE | Facility: CLINIC | Age: 70
End: 2025-02-26
Payer: MEDICARE

## 2025-03-05 ENCOUNTER — PATIENT OUTREACH (OUTPATIENT)
Dept: ADMINISTRATIVE | Facility: HOSPITAL | Age: 70
End: 2025-03-05
Payer: MEDICARE

## 2025-03-05 NOTE — PROGRESS NOTES
Population Health Chart Review & Patient Outreach Details      Additional [facility] Notes:    Patient decline digital medicine that they recently outreached. Patient currently out of town but stated reading been in range but unsure last reading. He was sent blood pressure reading through portal when he check again. Placed 2 weeks reminder to check patient chart for update if note will call patient again.            Updates Requested / Reviewed:      Updated Care Coordination Note, Care Everywhere, and Care Team Updated         Health Maintenance Topics Overdue:      VBHM Score: 1     Uncontrolled BP  AAA Screening                       Health Maintenance Topic(s) Outreach Outcomes & Actions Taken:    Blood Pressure - Outreach Outcomes & Actions Taken  : Patient Will Call Back or Send Portal Message with Reading

## 2025-03-06 ENCOUNTER — PATIENT MESSAGE (OUTPATIENT)
Dept: FAMILY MEDICINE | Facility: CLINIC | Age: 70
End: 2025-03-06
Payer: MEDICARE

## 2025-03-13 ENCOUNTER — LAB VISIT (OUTPATIENT)
Dept: LAB | Facility: HOSPITAL | Age: 70
End: 2025-03-13
Attending: INTERNAL MEDICINE
Payer: MEDICARE

## 2025-03-13 DIAGNOSIS — Z12.5 PROSTATE CANCER SCREENING: ICD-10-CM

## 2025-03-13 DIAGNOSIS — Z00.00 ANNUAL PHYSICAL EXAM: ICD-10-CM

## 2025-03-13 DIAGNOSIS — R79.9 ABNORMAL FINDING OF BLOOD CHEMISTRY, UNSPECIFIED: ICD-10-CM

## 2025-03-13 LAB
ALBUMIN SERPL BCP-MCNC: 3.7 G/DL (ref 3.5–5.2)
ALP SERPL-CCNC: 65 U/L (ref 40–150)
ALT SERPL W/O P-5'-P-CCNC: 15 U/L (ref 10–44)
ANION GAP SERPL CALC-SCNC: 11 MMOL/L (ref 8–16)
AST SERPL-CCNC: 18 U/L (ref 10–40)
BASOPHILS # BLD AUTO: 0.05 K/UL (ref 0–0.2)
BASOPHILS NFR BLD: 0.8 % (ref 0–1.9)
BILIRUB SERPL-MCNC: 0.8 MG/DL (ref 0.1–1)
BUN SERPL-MCNC: 10 MG/DL (ref 8–23)
CALCIUM SERPL-MCNC: 9.4 MG/DL (ref 8.7–10.5)
CHLORIDE SERPL-SCNC: 105 MMOL/L (ref 95–110)
CHOLEST SERPL-MCNC: 141 MG/DL (ref 120–199)
CHOLEST/HDLC SERPL: 2.7 {RATIO} (ref 2–5)
CO2 SERPL-SCNC: 26 MMOL/L (ref 23–29)
COMPLEXED PSA SERPL-MCNC: 0.57 NG/ML (ref 0–4)
CREAT SERPL-MCNC: 1 MG/DL (ref 0.5–1.4)
DIFFERENTIAL METHOD BLD: ABNORMAL
EOSINOPHIL # BLD AUTO: 0.2 K/UL (ref 0–0.5)
EOSINOPHIL NFR BLD: 3.2 % (ref 0–8)
ERYTHROCYTE [DISTWIDTH] IN BLOOD BY AUTOMATED COUNT: 14.2 % (ref 11.5–14.5)
EST. GFR  (NO RACE VARIABLE): >60 ML/MIN/1.73 M^2
ESTIMATED AVG GLUCOSE: 117 MG/DL (ref 68–131)
GLUCOSE SERPL-MCNC: 93 MG/DL (ref 70–110)
HBA1C MFR BLD: 5.7 % (ref 4–5.6)
HCT VFR BLD AUTO: 42.5 % (ref 40–54)
HDLC SERPL-MCNC: 52 MG/DL (ref 40–75)
HDLC SERPL: 36.9 % (ref 20–50)
HGB BLD-MCNC: 13.9 G/DL (ref 14–18)
IMM GRANULOCYTES # BLD AUTO: 0.01 K/UL (ref 0–0.04)
IMM GRANULOCYTES NFR BLD AUTO: 0.2 % (ref 0–0.5)
LDLC SERPL CALC-MCNC: 79.8 MG/DL (ref 63–159)
LYMPHOCYTES # BLD AUTO: 2 K/UL (ref 1–4.8)
LYMPHOCYTES NFR BLD: 32 % (ref 18–48)
MCH RBC QN AUTO: 27.4 PG (ref 27–31)
MCHC RBC AUTO-ENTMCNC: 32.7 G/DL (ref 32–36)
MCV RBC AUTO: 84 FL (ref 82–98)
MONOCYTES # BLD AUTO: 0.7 K/UL (ref 0.3–1)
MONOCYTES NFR BLD: 10.4 % (ref 4–15)
NEUTROPHILS # BLD AUTO: 3.4 K/UL (ref 1.8–7.7)
NEUTROPHILS NFR BLD: 53.4 % (ref 38–73)
NONHDLC SERPL-MCNC: 89 MG/DL
NRBC BLD-RTO: 0 /100 WBC
PLATELET # BLD AUTO: 213 K/UL (ref 150–450)
PMV BLD AUTO: 11.3 FL (ref 9.2–12.9)
POTASSIUM SERPL-SCNC: 3.7 MMOL/L (ref 3.5–5.1)
PROT SERPL-MCNC: 7.1 G/DL (ref 6–8.4)
RBC # BLD AUTO: 5.08 M/UL (ref 4.6–6.2)
SODIUM SERPL-SCNC: 142 MMOL/L (ref 136–145)
TRIGL SERPL-MCNC: 46 MG/DL (ref 30–150)
WBC # BLD AUTO: 6.34 K/UL (ref 3.9–12.7)

## 2025-03-13 PROCEDURE — 80061 LIPID PANEL: CPT | Mod: HCNC | Performed by: INTERNAL MEDICINE

## 2025-03-13 PROCEDURE — 85025 COMPLETE CBC W/AUTO DIFF WBC: CPT | Mod: HCNC | Performed by: INTERNAL MEDICINE

## 2025-03-13 PROCEDURE — 83036 HEMOGLOBIN GLYCOSYLATED A1C: CPT | Mod: HCNC | Performed by: INTERNAL MEDICINE

## 2025-03-13 PROCEDURE — 80053 COMPREHEN METABOLIC PANEL: CPT | Mod: HCNC | Performed by: INTERNAL MEDICINE

## 2025-03-13 PROCEDURE — 36415 COLL VENOUS BLD VENIPUNCTURE: CPT | Mod: HCNC,PO | Performed by: INTERNAL MEDICINE

## 2025-03-13 PROCEDURE — 84153 ASSAY OF PSA TOTAL: CPT | Mod: HCNC | Performed by: INTERNAL MEDICINE

## 2025-03-17 ENCOUNTER — CLINICAL SUPPORT (OUTPATIENT)
Dept: REHABILITATION | Facility: OTHER | Age: 70
End: 2025-03-17
Payer: MEDICARE

## 2025-03-17 DIAGNOSIS — M25.69 DECREASED RANGE OF MOTION OF TRUNK AND BACK: ICD-10-CM

## 2025-03-17 DIAGNOSIS — R29.898 DECREASED STRENGTH OF TRUNK AND BACK: Primary | ICD-10-CM

## 2025-03-17 PROCEDURE — 97110 THERAPEUTIC EXERCISES: CPT | Mod: HCNC

## 2025-03-17 PROCEDURE — 97112 NEUROMUSCULAR REEDUCATION: CPT | Mod: HCNC

## 2025-03-17 NOTE — PROGRESS NOTES
"  Physical Therapy Daily Note     Name: Ascencion Owens  Clinic Number: [id number]    Therapy Diagnosis:   Encounter Diagnoses   Name Primary?    Decreased strength of trunk and back Yes    Decreased range of motion of trunk and back          Physician: Milagro Patel PA-C    Visit Date: 3/17/2025    Physician Orders: PT Eval and Treat   Medical Diagnosis from Referral: M54.16 (ICD-10-CM) - Lumbar radiculopathy, chronic   Evaluation Date: 12/5/2024  Authorization Period Expiration: 12/31/25  Plan of Care Expiration: 1updated to 3/26/25  Visit # / Visits authorized: 13/10  (pending) Progress Note Due: 2/13/25  FOTO: 3/ 3          Precautions: Standard     Time In: 9:00 am  Time Out: 10:00 am   Total Billable Time: 60 minutes 1:1   Total Appointment Time (timed & untimed codes): 60 minutes    Subjective     Pt reports: usual low back soreness which he believes is d/t exercising.       he was somewhat compliant with home exercise program given last session.   Response to previous treatment: well /c no c/o of excess discomfort   Functional change: improved exercise tolerance /c gym visits    Pain:  0/10   Location: Mid to R side low back    Pts goals: I want to get stronger, be able to  my granddaughter     Objective         Treatment     Ascencion received the following manual therapy techniques for 0 minutes:   NA today    Ascencion received therapeutic exercises to develop strength, endurance, ROM, flexibility, posture, and core stabilization for 50 minutes including:    Recumbent Bike x 6' for joint nutrition    LTR  x 10 /c wide stance for hip mobility  Supine Hip flexor stretch  x 1' sec each side + strap for inc intensity  Prone press ups x 20   Quad   Cat/cow x 15 tactile cue for dec WS, mid range   90/90 holds 3 x 20"  Bridges 2 x 10 3 second holds +GTB    MedX Torso rotation machine  26# x 20 reps each side 4 ROM 3/10 RPE  MedX lumbar machine 68# x +20  (0-50 ROM) 4/10 RPE    NP  Standing glut med RTB 2 " x 10 3 second holds  2x10 sit<>stands 20#      Ascencion participated in dynamic functional therapeutic activities to improve functional performance for 10 minutes, including:    To simulate grandchild :  20# KB step to and  from 6 inch box turned on tall side x 10 reps, turned on short side x 10   L foot on step stool tying shoe 10 sec   Front carry 2 lap 30 KB lifted from 6 in box turned on short side   Bilateral 15# farmers carry x 1 lap (20kb 2nd round)    NP:   March with KB hold 20# 2 x 10 unilateral hold   lunge to 6 in box x 10, lunge to 4 in step x 10 ea    Ascencion participated in neuromuscular re-education activities to improve: Balance, Coordination, and Posture for 0 minutes. The following activities were included:  NA today    NP  Reverse hyper 3 x 5 reps    Ascencion received ice pack for 5 minutes to low back in Z lie.    Home Exercises and Education Provided     Education provided:   - Application of Gayathri Exertion Scale     Written Home Exercises Provided:   Exercises were reviewed and Ascencion was able to demonstrate them prior to the end of the session.  Ascencion demonstrated good  understanding of the HEP provided.   .   See EMR under Patient Instructions for exercises provided prior visit  Assessment     Patient presents to therapy session with complaint of soreness. He tolerated exercises well without exhibiting further exacerbation of symptoms.  Will continue to monitor and progress exercises as tolerated by patient with appropriate challenge.         Ascencion is progressing well towards his goals and goals were updated on 1/29/25. Pt prognosis is Good.     Pt will continue to benefit from skilled outpatient physical therapy to address the deficits listed in the problem list on initial evaluation provide pt/family education and to maximize pt's level of independence in the home and community environment.     Anticipated Barriers for therapy: none    Pt's spiritual, cultural and  educational needs considered and pt agreeable to plan of care and goals.    GOALS: Short Term Goals:  4 weeks  - Report decreased in pain at worse less than  <   / =  4  /10  to increase tolerance for functional activities. MET  - Patient lifting 10# kettle floor to plinth x 10 /s inc LB discomfort for inc tolerance to household chores  MET  - Patient verbalize good understanding of importance of proper posture in resisted exercise, functional activities & ADL's in order to help reduce postural strain, promote proper breathing. MET  - Pt to tolerate HEP to improve ROM and independence with ADL's. MET        Long Term Goals: 8 weeks  - Report decreased in pain at worse less than  <   / =  2  /10  to increase tolerance for functional mobility.  MET   - Increased B hip ext MMT to 4+/5 grade to increase tolerance for ADL and work activities.Appropriate and Ongoing   - Pt will report at 71 or greater score on FOTO Lumbar spine survey  to demonstrate decrease in disability and improvement in back pain.Appropriate and Ongoing   - Pt to be Independent with HEP to improve ROM and independence with ADL's. On going  - Pt to demonstrate negative Bridge Test in order to show improved core strength for lumbar stabilization. MET  - Pt to demonstrate ability to independently control and reduce their pain through posture positioning and mechanical movements throughout a typical day. Appropriate and Ongoing  - Patient will report ability to walk > 2 miles /s inc LB discomfort to demonstrate inc tolerance to recreational tasks  Appropriate and Ongoing   - Patient lifting 20# kettle floor to plinth x 10 /s inc LB discomfort for inc tolerance to household chores  Appropriate and Ongoing      Plan   Plan of care Certification: updated to 3/26/25    Outpatient Physical Therapy 1 times weekly for 6-8 weeks to include the following interventions: Manual Therapy, Moist Heat/ Ice, Neuromuscular Re-ed, Patient Education, Self Care, Therapeutic  Activities, and Therapeutic Exercise. Mark Louis, PT

## 2025-03-20 ENCOUNTER — OFFICE VISIT (OUTPATIENT)
Dept: FAMILY MEDICINE | Facility: CLINIC | Age: 70
End: 2025-03-20
Payer: MEDICARE

## 2025-03-20 ENCOUNTER — TELEPHONE (OUTPATIENT)
Dept: HEMATOLOGY/ONCOLOGY | Facility: CLINIC | Age: 70
End: 2025-03-20
Payer: MEDICARE

## 2025-03-20 VITALS
DIASTOLIC BLOOD PRESSURE: 76 MMHG | BODY MASS INDEX: 32.98 KG/M2 | SYSTOLIC BLOOD PRESSURE: 112 MMHG | WEIGHT: 222.69 LBS | TEMPERATURE: 98 F | HEIGHT: 69 IN | OXYGEN SATURATION: 97 % | RESPIRATION RATE: 18 BRPM | HEART RATE: 58 BPM

## 2025-03-20 DIAGNOSIS — R35.1 NOCTURIA: ICD-10-CM

## 2025-03-20 DIAGNOSIS — I10 ESSENTIAL HYPERTENSION: ICD-10-CM

## 2025-03-20 DIAGNOSIS — G47.33 OSA (OBSTRUCTIVE SLEEP APNEA): ICD-10-CM

## 2025-03-20 DIAGNOSIS — R73.03 PREDIABETES: ICD-10-CM

## 2025-03-20 DIAGNOSIS — E78.5 HYPERLIPIDEMIA, ACQUIRED: ICD-10-CM

## 2025-03-20 DIAGNOSIS — M51.26 PROTRUSION OF LUMBAR INTERVERTEBRAL DISC: ICD-10-CM

## 2025-03-20 DIAGNOSIS — Z00.00 ANNUAL PHYSICAL EXAM: Primary | ICD-10-CM

## 2025-03-20 DIAGNOSIS — K64.8 INTERNAL HEMORRHOIDS: ICD-10-CM

## 2025-03-20 DIAGNOSIS — Z12.11 COLON CANCER SCREENING: ICD-10-CM

## 2025-03-20 DIAGNOSIS — R00.2 PALPITATIONS: ICD-10-CM

## 2025-03-20 PROBLEM — I82.432 ACUTE EMBOLISM AND THROMBOSIS OF LEFT POPLITEAL VEIN: Status: RESOLVED | Noted: 2024-07-16 | Resolved: 2025-03-20

## 2025-03-20 PROCEDURE — 3008F BODY MASS INDEX DOCD: CPT | Mod: HCNC,CPTII,S$GLB, | Performed by: INTERNAL MEDICINE

## 2025-03-20 PROCEDURE — 4010F ACE/ARB THERAPY RXD/TAKEN: CPT | Mod: HCNC,CPTII,S$GLB, | Performed by: INTERNAL MEDICINE

## 2025-03-20 PROCEDURE — 3044F HG A1C LEVEL LT 7.0%: CPT | Mod: HCNC,CPTII,S$GLB, | Performed by: INTERNAL MEDICINE

## 2025-03-20 PROCEDURE — 1160F RVW MEDS BY RX/DR IN RCRD: CPT | Mod: HCNC,CPTII,S$GLB, | Performed by: INTERNAL MEDICINE

## 2025-03-20 PROCEDURE — 1159F MED LIST DOCD IN RCRD: CPT | Mod: HCNC,CPTII,S$GLB, | Performed by: INTERNAL MEDICINE

## 2025-03-20 PROCEDURE — 1126F AMNT PAIN NOTED NONE PRSNT: CPT | Mod: HCNC,CPTII,S$GLB, | Performed by: INTERNAL MEDICINE

## 2025-03-20 PROCEDURE — 3078F DIAST BP <80 MM HG: CPT | Mod: HCNC,CPTII,S$GLB, | Performed by: INTERNAL MEDICINE

## 2025-03-20 PROCEDURE — 1101F PT FALLS ASSESS-DOCD LE1/YR: CPT | Mod: HCNC,CPTII,S$GLB, | Performed by: INTERNAL MEDICINE

## 2025-03-20 PROCEDURE — 99397 PER PM REEVAL EST PAT 65+ YR: CPT | Mod: HCNC,S$GLB,, | Performed by: INTERNAL MEDICINE

## 2025-03-20 PROCEDURE — 3074F SYST BP LT 130 MM HG: CPT | Mod: HCNC,CPTII,S$GLB, | Performed by: INTERNAL MEDICINE

## 2025-03-20 PROCEDURE — 3288F FALL RISK ASSESSMENT DOCD: CPT | Mod: HCNC,CPTII,S$GLB, | Performed by: INTERNAL MEDICINE

## 2025-03-20 PROCEDURE — 99999 PR PBB SHADOW E&M-EST. PATIENT-LVL IV: CPT | Mod: PBBFAC,HCNC,, | Performed by: INTERNAL MEDICINE

## 2025-03-20 RX ORDER — LOSARTAN POTASSIUM 50 MG/1
50 TABLET ORAL DAILY
Qty: 90 TABLET | Refills: 3 | Status: SHIPPED | OUTPATIENT
Start: 2025-03-20 | End: 2026-03-20

## 2025-03-20 NOTE — PROGRESS NOTES
Health Maintenance Due   Topic     Abdominal Aortic Aneurysm Screening  Consult PCP    Colorectal Cancer Screening  Consult PCP

## 2025-03-20 NOTE — ASSESSMENT & PLAN NOTE
- Reviewed lab results, including A1c levels.  - Noted that A1c levels are stable but still in the prediabetic range.  - Advised continued monitoring of prediabetic state.  - No specific treatment prescribed at this time.

## 2025-03-20 NOTE — ASSESSMENT & PLAN NOTE
- Assessed occasional palpitations as infrequent and not currently warranting further investigation.  - Educated the patient that infrequent palpitations can be normal and do not always indicate a serious condition.  - Noted that the patient experienced heart fluttering for about 1 minute on March 5th.  - Determined that the palpitations are not related to anemia and are likely normal occasional irregular heartbeats.  - Discussed the possibility of wearing a heart monitor if palpitations become more frequent to diagnose the type of arrhythmia.  - Advised the patient to report if frequency of palpitations increases.

## 2025-03-20 NOTE — ASSESSMENT & PLAN NOTE
- Evaluated back pain, noting improvement with physical therapy and exercises.  - Instructed the patient to continue physical therapy exercises for back, even after discharge, to prevent recurrence of pain.  - Noted that sharp pain has resolved, but soreness from exercises remains.  - Reviewed MRI results showing a small disc prolapse without causing narrowing, possibly causing nerve compression.  - Acknowledged the effectiveness of physical therapy in strengthening muscles around the affected area.  - Continued current treatment with meloxicam as needed and tizanidine for muscle relaxation.

## 2025-03-20 NOTE — ASSESSMENT & PLAN NOTE
- Reviewed lab results, including cholesterol levels.  - Noted improvement in cholesterol levels compared to last year.  - Continued current atorvastatin 20 mg daily for cholesterol management.

## 2025-03-20 NOTE — ASSESSMENT & PLAN NOTE
Discussed HCM with patient  - patient UTD with vaccines. Discussed the importance of vaccines.   - annual labs reviewed  - Last Colonoscopy completed on 2015. Ordered repeat  - PSA normal  - advised patient to follow up with ophthalmologist and dentist every year  - reviewed medical, surgical, family and social history

## 2025-03-20 NOTE — PROGRESS NOTES
Chief Complaint: Annual Exam      Ascencion Owens  is a 69 y.o. year old patient who presents today for     History of Present Illness    CHIEF COMPLAINT:  Ascencion presents today for annual follow-up    MUSCULOSKELETAL:  He initially injured his back in 1979 with chronic issues since then. He presented in December with episode of extreme sharp pain. MRI showed disc prolapse without narrowing, suggesting possible nerve compression. He is currently attending physical therapy with resolution of sharp pain, though reports soreness from exercises and stretches which he is able to manage.    CARDIOVASCULAR:  He experiences palpitations approximately every 6 months, with most recent episode on March 5th lasting approximately one minute. He was hospitalized in 2011 for palpitations where heart monitor was placed but did not detect abnormalities even during symptomatic episodes.    GASTROINTESTINAL:  He reports difficulty with complete bowel evacuation, particularly with softer stools, requiring extensive wiping. He has history of internal hemorrhoids documented on colonoscopy from 2015. He has regular daily bowel movements with occasional skipped days occurring every couple of weeks.    SLEEP:  He started CPAP therapy in fall and uses full face mask interface.    LABS:  A1c remains stable at prediabetic levels. CBC shows mild anemia.    CURRENT MEDICATIONS:  He takes Amlodipine 10mg daily, Atorvastatin 20mg daily, Flonase for CPAP-related nasal congestion, Meloxicam as needed, Multivitamin, Xarelto, Flomax in morning, Tizanidine for muscle relaxation, and Vitamin D.      ROS:  General: -fever, -chills, -fatigue, -weight gain, -weight loss  Eyes: -vision changes, -redness, -discharge  ENT: -ear pain, +nasal congestion, -sore throat  Cardiovascular: -chest pain, +palpitations, +lower extremity edema, +feeling of flutter in chest  Respiratory: -cough, -shortness of breath  Gastrointestinal: -abdominal pain, -nausea,  -vomiting, -diarrhea, -constipation, -blood in stool, +change in bowel habits  Genitourinary: -dysuria, -hematuria, -frequency  Musculoskeletal: -joint pain, +muscle pain, +back pain  Skin: -rash, -lesion  Neurological: -headache, -dizziness, -numbness, -tingling  Psychiatric: -anxiety, -depression, -sleep difficulty         Past Medical History:   Diagnosis Date    Acute bronchitis     Acute embolism and thrombosis of left popliteal vein 07/16/2024    Noted by ASHLEY MINOR FNP  last documented on [id number]      Arthritis     CKD (chronic kidney disease)     Cluster headache     Dyslipidemia     Essential hypertension, benign     History of pulmonary embolism 11/09/2012    Hypercholesteremia     Obesity        Past Surgical History:   Procedure Laterality Date    APPENDECTOMY      COLONOSCOPY  01/01/2004    FRACTURE SURGERY  Ankle [employer] football    lt ankle surgery  01/01/1973    VASECTOMY  Around 1988        Family History   Problem Relation Name Age of Onset    Diabetes Mother Natali Owens     Hypertension Mother Natali Owens     Heart disease Mother Natali Owens     Arthritis Mother Natali Owens     Arthritis Father Vinicius Owens Sr     Arthritis Sister Natali Guerra         Social History     Socioeconomic History    Marital status:    Tobacco Use    Smoking status: Former     Types: Cigars     Start date: 2004     Quit date: 2010     Years since quitting: 15.2    Smokeless tobacco: Never    Tobacco comments:     occ   Substance and Sexual Activity    Alcohol use: Yes     Alcohol/week: 1.0 standard drink of alcohol     Types: 1 Shots of liquor per week     Comment: occasionally    Drug use: No    Sexual activity: Yes     Partners: Female     Social Drivers of Health     Financial Resource Strain: Low Risk  (7/15/2024)    Overall Financial Resource Strain (CARDIA)     Difficulty of Paying Living Expenses: Not very hard   Food Insecurity: No Food Insecurity (7/15/2024)    Hunger Vital Sign      Worried About Running Out of Food in the Last Year: Never true     Ran Out of Food in the Last Year: Never true   Transportation Needs: No Transportation Needs (6/8/2022)    PRAPARE - Transportation     Lack of Transportation (Medical): No     Lack of Transportation (Non-Medical): No   Physical Activity: Sufficiently Active (7/15/2024)    Exercise Vital Sign     Days of Exercise per Week: 3 days     Minutes of Exercise per Session: 80 min   Stress: Stress Concern Present (7/15/2024)    [employer] - Occupational Stress Questionnaire     Feeling of Stress : To some extent   Housing Stability: Low Risk  (6/8/2022)    Housing Stability Vital Sign     Unable to Pay for Housing in the Last Year: No     Number of Places Lived in the Last Year: 1     Unstable Housing in the Last Year: No       Current Medications[1]           Objective:      Vitals:    03/20/25 1046   BP: 112/76   Pulse: (!) 58   Resp: 18   Temp: 97.8 °F (36.6 °C)       Physical Exam  Vitals and nursing note reviewed.   Constitutional:       Appearance: Normal appearance.   HENT:      Head: Normocephalic and atraumatic.   Cardiovascular:      Rate and Rhythm: Normal rate and regular rhythm.   Pulmonary:      Effort: Pulmonary effort is normal.      Breath sounds: Normal breath sounds. No wheezing or rales.   Abdominal:      General: There is distension.      Palpations: Abdomen is soft.      Tenderness: There is no abdominal tenderness.      Comments: hyperactive bowel sounds   Musculoskeletal:      Right lower leg: Edema present.      Left lower leg: Edema present.   Skin:     General: Skin is warm and dry.   Neurological:      General: No focal deficit present.      Mental Status: He is alert and oriented to person, place, and time.   Psychiatric:         Mood and Affect: Mood normal.         Behavior: Behavior normal.          Assessment:       1. Annual physical exam    2. JOSEPH (obstructive sleep apnea)    3. Colon cancer  screening    4. Essential hypertension    5. Palpitations    6. Hyperlipidemia, acquired    7. Protrusion of lumbar intervertebral disc    8. Prediabetes    9. Internal hemorrhoids    10. Nocturia          Plan:   1. Annual physical exam  Assessment & Plan:  Discussed HCM with patient  - patient UTD with vaccines. Discussed the importance of vaccines.   - annual labs reviewed  - Last Colonoscopy completed on 2015. Ordered repeat  - PSA normal  - advised patient to follow up with ophthalmologist and dentist every year  - reviewed medical, surgical, family and social history        2. JOSEPH (obstructive sleep apnea)  Assessment & Plan:  AHI 25 (8/20/24)  - Assessed current medication regimen, including recent addition of Flonase for CPAP-related nasal congestion.  - Noted that the patient has been using a CPAP machine since fall, with initial issues of nasal congestion.  - Confirmed that the patient is using a mask-type CPAP machine, which is reported to be more comfortable.  - Continued use of Flonase as prescribed by the CPAP doctor to manage nasal congestion associated with CPAP use.      3. Colon cancer screening  -     Ambulatory referral/consult to Endo Procedure ; Future; Expected date: 03/21/2025    4. Essential hypertension  Assessment & Plan:  Chronic, controlled     - Discontinued Amlodipine due to leg swelling  - Prescribed Losartan daily for blood pressure control.  - Instructed the patient to start Losartan once received from pharmacy and simultaneously discontinue Amlodipine.  - Educated the patient on the benefits of Losartan over Amlodipine, including reduced side effects such as leg swelling.  - Advised the patient to monitor blood pressure at home after switching to new antihypertensive medication.  - Scheduled follow up in 6 months to assess response to new blood pressure medication.  - Instructed the patient to contact the office with blood pressure readings after starting  Losartan.      Orders:  -     losartan (COZAAR) 50 MG tablet; Take 1 tablet (50 mg total) by mouth once daily.  Dispense: 90 tablet; Refill: 3    5. Palpitations  Assessment & Plan:  - Assessed occasional palpitations as infrequent and not currently warranting further investigation.  - Educated the patient that infrequent palpitations can be normal and do not always indicate a serious condition.  - Noted that the patient experienced heart fluttering for about 1 minute on March 5th.  - Determined that the palpitations are not related to anemia and are likely normal occasional irregular heartbeats.  - Discussed the possibility of wearing a heart monitor if palpitations become more frequent to diagnose the type of arrhythmia.  - Advised the patient to report if frequency of palpitations increases.      6. Hyperlipidemia, acquired  Assessment & Plan:  - Reviewed lab results, including cholesterol levels.  - Noted improvement in cholesterol levels compared to last year.  - Continued current atorvastatin 20 mg daily for cholesterol management.      7. Protrusion of lumbar intervertebral disc  Assessment & Plan:  - Evaluated back pain, noting improvement with physical therapy and exercises.  - Instructed the patient to continue physical therapy exercises for back, even after discharge, to prevent recurrence of pain.  - Noted that sharp pain has resolved, but soreness from exercises remains.  - Reviewed MRI results showing a small disc prolapse without causing narrowing, possibly causing nerve compression.  - Acknowledged the effectiveness of physical therapy in strengthening muscles around the affected area.  - Continued current treatment with meloxicam as needed and tizanidine for muscle relaxation.      8. Prediabetes  Assessment & Plan:  - Reviewed lab results, including A1c levels.  - Noted that A1c levels are stable but still in the prediabetic range.  - Advised continued monitoring of prediabetic state.  - No  specific treatment prescribed at this time.      9. Internal hemorrhoids  Assessment & Plan:  - Noted ongoing issues with incomplete bowel movements and difficulty wiping clean, possibly related to hemorrhoids diagnosed in a previous colonoscopy.  - Assessed that the issue might be related to stool consistency rather than a structural problem.  - Noted no pain, bleeding (except when wiping excessively), or soreness associated with the condition.      10. Nocturia  Assessment & Plan:  Chronic, improving    - continue Flomax in the morning            ANEMIA:  - Reviewed lab results, including blood counts.  - Noted slightly low hemoglobin levels, but still within an acceptable range (close to 14).  - Asse  ssed current anemia as mild and not of immediate concern.  - No specific treatment prescribed for anemia at this time due to mild nature of the condition.    COLORECTAL CANCER SCREENING:  - Discussed the importance of regular colorectal cancer screening.  - Ordered colonoscopy for colorectal cancer screening due to history and need for structural evaluation.  - Instructed the patient to contact the office if any issues arise with the colonoscopy procedure.         Follow up in about 6 months (around 9/20/2025).    This note was generated with the assistance of ambient listening technology. Verbal consent was obtained by the patient and accompanying visitor(s) for the recording of patient appointment to facilitate this note. I attest to having reviewed and edited the generated note for accuracy, though some syntax or spelling errors may persist. Please contact the author of this note for any clarification.                  [1]   Current Outpatient Medications:     atorvastatin (LIPITOR) 20 MG tablet, Take 1 tablet (20 mg total) by mouth once daily., Disp: 90 tablet, Rfl: 3    fluticasone propionate (FLONASE) 50 mcg/actuation nasal spray, 1 spray (50 mcg total) by Each Nostril route once daily., Disp: 9.9 mL, Rfl: 3     losartan (COZAAR) 50 MG tablet, Take 1 tablet (50 mg total) by mouth once daily., Disp: 90 tablet, Rfl: 3    meloxicam (MOBIC) 15 MG tablet, Take 1 tablet (15 mg total) by mouth daily as needed for Pain., Disp: 30 tablet, Rfl: 11    multivitamin (ONE DAILY MULTIVITAMIN) per tablet, Take 1 tablet by mouth once daily., Disp: , Rfl:     rivaroxaban (XARELTO) 20 mg Tab, Take 1 tablet (20 mg total) by mouth daily with dinner or evening meal., Disp: 30 tablet, Rfl: 11    tamsulosin (FLOMAX) 0.4 mg Cap, Take 1 capsule (0.4 mg total) by mouth once daily., Disp: 90 capsule, Rfl: 3    tiZANidine (ZANAFLEX) 4 MG tablet, TAKE 1 TO 2 TABLETS BY MOUTH NIGHTLY AS NEEDED FOR PAIN, Disp: 180 tablet, Rfl: 2    vitamin D (VITAMIN D3) 1000 units Tab, Take 5,000 Units by mouth once daily., Disp: , Rfl:

## 2025-03-20 NOTE — ASSESSMENT & PLAN NOTE
Chronic, controlled     - Discontinued Amlodipine due to leg swelling  - Prescribed Losartan daily for blood pressure control.  - Instructed the patient to start Losartan once received from pharmacy and simultaneously discontinue Amlodipine.  - Educated the patient on the benefits of Losartan over Amlodipine, including reduced side effects such as leg swelling.  - Advised the patient to monitor blood pressure at home after switching to new antihypertensive medication.  - Scheduled follow up in 6 months to assess response to new blood pressure medication.  - Instructed the patient to contact the office with blood pressure readings after starting Losartan.

## 2025-03-20 NOTE — ASSESSMENT & PLAN NOTE
AHI 25 (8/20/24)  - Assessed current medication regimen, including recent addition of Flonase for CPAP-related nasal congestion.  - Noted that the patient has been using a CPAP machine since fall, with initial issues of nasal congestion.  - Confirmed that the patient is using a mask-type CPAP machine, which is reported to be more comfortable.  - Continued use of Flonase as prescribed by the CPAP doctor to manage nasal congestion associated with CPAP use.

## 2025-03-20 NOTE — ASSESSMENT & PLAN NOTE
- Noted ongoing issues with incomplete bowel movements and difficulty wiping clean, possibly related to hemorrhoids diagnosed in a previous colonoscopy.  - Assessed that the issue might be related to stool consistency rather than a structural problem.  - Noted no pain, bleeding (except when wiping excessively), or soreness associated with the condition.

## 2025-03-24 ENCOUNTER — OFFICE VISIT (OUTPATIENT)
Dept: HEMATOLOGY/ONCOLOGY | Facility: CLINIC | Age: 70
End: 2025-03-24
Payer: MEDICARE

## 2025-03-24 VITALS
DIASTOLIC BLOOD PRESSURE: 70 MMHG | BODY MASS INDEX: 33.11 KG/M2 | TEMPERATURE: 98 F | HEIGHT: 69 IN | OXYGEN SATURATION: 98 % | RESPIRATION RATE: 18 BRPM | SYSTOLIC BLOOD PRESSURE: 154 MMHG | HEART RATE: 61 BPM | WEIGHT: 223.56 LBS

## 2025-03-24 DIAGNOSIS — R53.83 FATIGUE, UNSPECIFIED TYPE: ICD-10-CM

## 2025-03-24 DIAGNOSIS — D53.9 ANEMIA, DEFICIENCY: ICD-10-CM

## 2025-03-24 DIAGNOSIS — I82.432 ACUTE DEEP VEIN THROMBOSIS (DVT) OF POPLITEAL VEIN OF LEFT LOWER EXTREMITY: Primary | ICD-10-CM

## 2025-03-24 PROCEDURE — 3077F SYST BP >= 140 MM HG: CPT | Mod: HCNC,CPTII,S$GLB, | Performed by: PHYSICIAN ASSISTANT

## 2025-03-24 PROCEDURE — 4010F ACE/ARB THERAPY RXD/TAKEN: CPT | Mod: HCNC,CPTII,S$GLB, | Performed by: PHYSICIAN ASSISTANT

## 2025-03-24 PROCEDURE — 99213 OFFICE O/P EST LOW 20 MIN: CPT | Mod: HCNC,S$GLB,, | Performed by: PHYSICIAN ASSISTANT

## 2025-03-24 PROCEDURE — 3044F HG A1C LEVEL LT 7.0%: CPT | Mod: HCNC,CPTII,S$GLB, | Performed by: PHYSICIAN ASSISTANT

## 2025-03-24 PROCEDURE — 1159F MED LIST DOCD IN RCRD: CPT | Mod: HCNC,CPTII,S$GLB, | Performed by: PHYSICIAN ASSISTANT

## 2025-03-24 PROCEDURE — 99999 PR PBB SHADOW E&M-EST. PATIENT-LVL III: CPT | Mod: PBBFAC,HCNC,, | Performed by: PHYSICIAN ASSISTANT

## 2025-03-24 PROCEDURE — 3288F FALL RISK ASSESSMENT DOCD: CPT | Mod: HCNC,CPTII,S$GLB, | Performed by: PHYSICIAN ASSISTANT

## 2025-03-24 PROCEDURE — 3008F BODY MASS INDEX DOCD: CPT | Mod: HCNC,CPTII,S$GLB, | Performed by: PHYSICIAN ASSISTANT

## 2025-03-24 PROCEDURE — 3078F DIAST BP <80 MM HG: CPT | Mod: HCNC,CPTII,S$GLB, | Performed by: PHYSICIAN ASSISTANT

## 2025-03-24 PROCEDURE — 1101F PT FALLS ASSESS-DOCD LE1/YR: CPT | Mod: HCNC,CPTII,S$GLB, | Performed by: PHYSICIAN ASSISTANT

## 2025-03-24 NOTE — PROGRESS NOTES
HEMATOLOGY/ONCOLOGY CLINIC VISIT NOTE    PATIENT: Ascencion Owens  MRN: [id number]  DATE: 3/24/2025    Subjective:     Chief complaint:  Long-term anticoagulation use monitoring     HEME History:  Initial heme clinic 2/7/23  Mr. Owens is a 67 y.o. male who presents for evaluation and management of recent acute DVT. He did unprovoked bilateral PE in 2011 and was on coumadin for a few months (duration unclear--pt can't recall) but had not been on any blood thinners since around 7338-5643. He developed acute onset of left leg pain and swelling and presented to the ED where ultrasound was performed and showed thrombus in the popliteal, peroneal, and posterior tibial veins. He was started on Eliquis and states that the swelling has decreased since starting that but has not disappeared entirely. His last colonoscopy was in 2015 and a 10-year interval screening exam was recommended so he is up to date on that; he is a nonsmoker; he has had normal PSA testing in recent past. He has no constitutional or B symptoms. He notes that he has been told that he has thick blood on multiple occasions when having blood drawn. He denies family history of blood clotting issues. He had labs done when he had clots in 2011 showing normal PT gene and normal FV.           Interval History: Mr. Owens returns for follow up. He is doing well with Xarelto, although reports one instance of what felt like palpitations that self resolved. He had labs done with PCP noting a hemoglobin of 13.9 g/dL, otherwise labs were normal. He denies chest pain today, SOB, dizziness, headaches, or pallor. No bruising or bleeding. He had a sleep study done last year and has now been with CPAP for sleep for some months which has significantly helped his fatigue.       Review of Systems   A comprehensive review of systems was performed; pertinent positives and negatives are noted in the HPI.      Objective:      Vitals:   Vitals:    03/24/25 1144   BP: (!) 154/70  "  BP Location: Left arm   Patient Position: Sitting   Pulse: 61   Resp: 18   Temp: 98 °F (36.7 °C)   TempSrc: Oral   SpO2: 98%   Weight: 101.4 kg (223 lb 8.7 oz)   Height: 5' 9" (1.753 m)     BMI: Body mass index is 33.01 kg/m².      Physical Exam:   Physical Exam  Vitals and nursing note reviewed.   Constitutional:       General: He is not in acute distress.     Appearance: Normal appearance. He is normal weight. He is not toxic-appearing.   HENT:      Head: Normocephalic and atraumatic.   Eyes:      General: No scleral icterus.     Conjunctiva/sclera: Conjunctivae normal.   Cardiovascular:      Rate and Rhythm: Normal rate.   Pulmonary:      Effort: Pulmonary effort is normal. No respiratory distress.   Abdominal:      General: There is no distension.   Musculoskeletal:         General: No deformity.      Cervical back: Neck supple.   Skin:     Coloration: Skin is not jaundiced.      Findings: No erythema.   Neurological:      General: No focal deficit present.      Mental Status: He is alert and oriented to person, place, and time. Mental status is at baseline.   Psychiatric:         Mood and Affect: Mood normal.         Behavior: Behavior normal.         Thought Content: Thought content normal.           Laboratory Data:  WBC   Date Value Ref Range Status   03/13/2025 6.34 3.90 - 12.70 K/uL Final     Hemoglobin   Date Value Ref Range Status   03/13/2025 13.9 (L) 14.0 - 18.0 g/dL Final     Hematocrit   Date Value Ref Range Status   03/13/2025 42.5 40.0 - 54.0 % Final     Platelets   Date Value Ref Range Status   03/13/2025 213 150 - 450 K/uL Final     Gran # (ANC)   Date Value Ref Range Status   03/13/2025 3.4 1.8 - 7.7 K/uL Final     Gran %   Date Value Ref Range Status   03/13/2025 53.4 38.0 - 73.0 % Final       Chemistry        Component Value Date/Time     03/13/2025 1006    K 3.7 03/13/2025 1006     03/13/2025 1006    CO2 26 03/13/2025 1006    BUN 10 03/13/2025 1006    CREATININE 1.0 03/13/2025 " 1006    GLU 93 03/13/2025 1006        Component Value Date/Time    CALCIUM 9.4 03/13/2025 1006    ALKPHOS 65 03/13/2025 1006    AST 18 03/13/2025 1006    ALT 15 03/13/2025 1006    BILITOT 0.8 03/13/2025 1006    ESTGFRAFRICA >60.0 06/07/2022 0912    EGFRNONAA >60.0 06/07/2022 0912              Assessment/Plan:     1. Acute deep vein thrombosis (DVT) of popliteal vein of left lower extremity    2. Fatigue, unspecified type    3. Anemia, deficiency      Tolerating Xarelto fine. Continue prophylactic dose indefinitely.   Fatigue has been much better with use of CPAP  His lower than reference range hgb of 13.9 g/dL is likely going to be an isolated event, but will check iron studies with CBC in 6 months     I spent a total of 27 minutes on the day of the visit.This includes face to face time and non-face to face time preparing to see the patient (eg, review of tests), obtaining and/or reviewing separately obtained history, documenting clinical information in the electronic or other health record, independently interpreting results and communicating results to the patient/family/caregiver, or care coordinator.     Above care plan was discussed with patient and all questions were addressed to their expressed satisfaction.    Mikaela Coats PA-C  Hematology/Oncology  [facility] [address], [address]  [place] [address]  Phone: [phone]  Fax: [phone]

## 2025-03-25 ENCOUNTER — PATIENT MESSAGE (OUTPATIENT)
Dept: FAMILY MEDICINE | Facility: CLINIC | Age: 70
End: 2025-03-25
Payer: MEDICARE

## 2025-03-27 ENCOUNTER — CLINICAL SUPPORT (OUTPATIENT)
Dept: REHABILITATION | Facility: OTHER | Age: 70
End: 2025-03-27
Payer: MEDICARE

## 2025-03-27 DIAGNOSIS — R29.898 DECREASED STRENGTH OF TRUNK AND BACK: Primary | ICD-10-CM

## 2025-03-27 DIAGNOSIS — M25.69 DECREASED RANGE OF MOTION OF TRUNK AND BACK: ICD-10-CM

## 2025-03-27 PROCEDURE — 97110 THERAPEUTIC EXERCISES: CPT | Mod: HCNC

## 2025-03-27 PROCEDURE — 97112 NEUROMUSCULAR REEDUCATION: CPT | Mod: HCNC

## 2025-03-27 NOTE — PROGRESS NOTES
"  Physical Therapy Daily Note     Name: Ascencion Owens  Clinic Number: [id number]    Therapy Diagnosis:   Encounter Diagnoses   Name Primary?    Decreased strength of trunk and back Yes    Decreased range of motion of trunk and back          Physician: Milagro Patel PA-C    Visit Date: 3/27/2025    Physician Orders: PT Eval and Treat   Medical Diagnosis from Referral: M54.16 (ICD-10-CM) - Lumbar radiculopathy, chronic   Evaluation Date: 12/5/2024  Authorization Period Expiration: 12/31/25  Plan of Care Expiration: updated to 3/27/25  Visit # / Visits authorized: 8/10 (14 total) Progress Note Due: 4/17/25  FOTO: 3/ 3        Precautions: Standard     Time In: 2:00 pm  Time Out: 3:00 pm   Total Billable Time: 45 minutes 1:1   Total Appointment Time (timed & untimed codes): 60 minutes    Subjective     Pt reports: soreness sometimes after going to the gym or heavy lifting but otherwise pain has been better. He feels ready to be discharged.     he was somewhat compliant with home exercise program given last session.   Response to previous treatment: well /c no c/o of excess discomfort   Functional change: improved exercise tolerance /c gym visits    Pain:  0/10   Location: Mid to R side low back    Pts goals: I want to get stronger, be able to  my granddaughter     Objective   Updated at progress report unless specified      Treatment     Ascencion received the following manual therapy techniques for 0 minutes:   NA today    Ascencion received therapeutic exercises to develop strength, endurance, ROM, flexibility, posture, and core stabilization for 45 minutes including:    Recumbent Bike x 6' for joint nutrition    LTR  x 10 /c wide stance for hip mobility  Supine Hip flexor stretch  x 1' sec each side + strap for inc intensity  Prone press ups x 20   Quad   Cat/cow x 15 tactile cue for dec WS, mid range   90/90 holds 3 x 20"  Bridges 2 x 10 3 second holds +GTB    MedX Torso rotation machine  26# x 20 reps each " side 4 ROM 3/10 RPE  MedX lumbar machine 68# x +20  (0-50 ROM) 4/10 RPE  Standing glut med RTB 2 x 10 3 second holds  2x10 sit<>stands 20#      Ascencion participated in dynamic functional therapeutic activities to improve functional performance for 10 minutes, including:    To simulate grandchild :  20# KB step to and  from 6 inch box turned on tall side x 10 reps, turned on short side x 10   L foot on step stool tying shoe 10 sec   Front carry 2 lap 30 KB lifted from 6 in box turned on short side   Bilateral 15# farmers carry x 1 lap (20kb 2nd round)    NP:   March with KB hold 20# 2 x 10 unilateral hold   lunge to 6 in box x 10, lunge to 4 in step x 10 ea    Ascencion participated in neuromuscular re-education activities to improve: Balance, Coordination, and Posture for 0 minutes. The following activities were included:  NA today    NP  Reverse hyper 3 x 5 reps    Ascencion received ice pack for 5 minutes to low back in Z lie.    Home Exercises and Education Provided     Education provided:   - Application of Gayathri Exertion Scale     Written Home Exercises Provided:   Exercises were reviewed and Ascencion was able to demonstrate them prior to the end of the session.  Ascencion demonstrated good  understanding of the HEP provided.   .   See EMR under Patient Instructions for exercises provided prior visit  Assessment     Ascencion presents today without complaints of pain. Due to gains in pain free lumbar spine ROM, improved strength of core, lumbar and B LE musculature, and lack of pain with functional mobility activities, pt will be discharged at this time with independent HEP.     Ascencion is progressing well towards his goals and goals were updated on 3/17/25. Pt prognosis is Good.     Pt will continue to benefit from skilled outpatient physical therapy to address the deficits listed in the problem list on initial evaluation provide pt/family education and to maximize pt's level of independence in the home  and community environment.     Anticipated Barriers for therapy: none    Pt's spiritual, cultural and educational needs considered and pt agreeable to plan of care and goals.    GOALS: Short Term Goals:  4 weeks  - Report decreased in pain at worse less than  <   / =  4  /10  to increase tolerance for functional activities. MET  - Patient lifting 10# kettle floor to plinth x 10 /s inc LB discomfort for inc tolerance to household chores  MET  - Patient verbalize good understanding of importance of proper posture in resisted exercise, functional activities & ADL's in order to help reduce postural strain, promote proper breathing. MET  - Pt to tolerate HEP to improve ROM and independence with ADL's. MET        Long Term Goals: 8 weeks  - Report decreased in pain at worse less than  <   / =  2  /10  to increase tolerance for functional mobility.  MET   - Increased B hip ext MMT to 4+/5 grade to increase tolerance for ADL and work activities.Appropriate and Ongoing   - Pt will report at 71 or greater score on FOTO Lumbar spine survey  to demonstrate decrease in disability and improvement in back pain.Appropriate and Ongoing   - Pt to be Independent with HEP to improve ROM and independence with ADL's. On going  - Pt to demonstrate negative Bridge Test in order to show improved core strength for lumbar stabilization. MET  - Pt to demonstrate ability to independently control and reduce their pain through posture positioning and mechanical movements throughout a typical day. Appropriate and Ongoing  - Patient will report ability to walk > 2 miles /s inc LB discomfort to demonstrate inc tolerance to recreational tasks  Appropriate and Ongoing   - Patient lifting 20# kettle floor to plinth x 10 /s inc LB discomfort for inc tolerance to household chores  Appropriate and Ongoing      Plan   Discharged with HEP    Harvey Campbell, PT

## 2025-04-15 ENCOUNTER — PATIENT MESSAGE (OUTPATIENT)
Dept: FAMILY MEDICINE | Facility: CLINIC | Age: 70
End: 2025-04-15
Payer: MEDICARE

## 2025-04-15 VITALS — DIASTOLIC BLOOD PRESSURE: 77 MMHG | SYSTOLIC BLOOD PRESSURE: 142 MMHG

## 2025-04-24 ENCOUNTER — CLINICAL SUPPORT (OUTPATIENT)
Dept: ENDOSCOPY | Facility: HOSPITAL | Age: 70
End: 2025-04-24
Attending: INTERNAL MEDICINE
Payer: MEDICARE

## 2025-04-24 ENCOUNTER — TELEPHONE (OUTPATIENT)
Dept: ENDOSCOPY | Facility: HOSPITAL | Age: 70
End: 2025-04-24

## 2025-04-24 VITALS — WEIGHT: 223 LBS | BODY MASS INDEX: 33.03 KG/M2 | HEIGHT: 69 IN

## 2025-04-24 DIAGNOSIS — Z12.11 COLON CANCER SCREENING: ICD-10-CM

## 2025-04-24 DIAGNOSIS — Z12.11 ENCOUNTER FOR SCREENING COLONOSCOPY: Primary | ICD-10-CM

## 2025-04-24 RX ORDER — POLYETHYLENE GLYCOL 3350, SODIUM SULFATE ANHYDROUS, SODIUM BICARBONATE, SODIUM CHLORIDE, POTASSIUM CHLORIDE 236; 22.74; 6.74; 5.86; 2.97 G/4L; G/4L; G/4L; G/4L; G/4L
4 POWDER, FOR SOLUTION ORAL ONCE
Qty: 4000 ML | Refills: 0 | Status: SHIPPED | OUTPATIENT
Start: 2025-04-24 | End: 2025-04-24

## 2025-04-25 ENCOUNTER — TELEPHONE (OUTPATIENT)
Dept: ENDOSCOPY | Facility: HOSPITAL | Age: 70
End: 2025-04-25
Payer: MEDICARE

## 2025-04-25 NOTE — TELEPHONE ENCOUNTER
----- Message from Med Assistant Samra sent at 2025  1:27 PM CDT -----  Regardin/10   BT  The patient is currently under an internal PCP, felipa Leyva. Is patient okay to hold blood thinner Xarelto (rivaroxaban) for their upcoming scheduled Colonoscopy on 6/10/25.

## 2025-04-25 NOTE — TELEPHONE ENCOUNTER
Dear JOCE ORLANDO,    Patient has a scheduled procedure Colonoscopy on 6/10/25 and is currently taking a blood thinner. In order to ensure patient safety, we would like to confirm that the patient can place their blood thinner medication on hold for the procedure. Can he/she discontinue Xarelto (rivaroxaban) for a minimum of 2 days prior to the procedure?     Thank you for your prompt reply.    [facility] Endoscopy Scheduling

## 2025-04-28 ENCOUNTER — PATIENT MESSAGE (OUTPATIENT)
Dept: HEMATOLOGY/ONCOLOGY | Facility: CLINIC | Age: 70
End: 2025-04-28
Payer: MEDICARE

## 2025-04-28 ENCOUNTER — TELEPHONE (OUTPATIENT)
Dept: HEMATOLOGY/ONCOLOGY | Facility: CLINIC | Age: 70
End: 2025-04-28
Payer: MEDICARE

## 2025-04-28 DIAGNOSIS — I82.432 ACUTE DEEP VEIN THROMBOSIS (DVT) OF POPLITEAL VEIN OF LEFT LOWER EXTREMITY: Primary | ICD-10-CM

## 2025-04-28 NOTE — TELEPHONE ENCOUNTER
----- Message from Ana sent at 4/28/2025  4:37 PM CDT -----  Type:  Needs Medical AdviceWho Called: Pt Symptoms (please be specific): pt states that he had a message sent over through pt portal and would like to notify dr or nurse if they could take a look at it and give a call back as soon as possible Would the patient rather a call back or a response via [employer] [facility]? Call back Best Call Back Number:  [phone] Information: Please be advised, pt states that he had tried to reach out to multiple times and has yet to be able to reach out or get to clinic

## 2025-04-28 NOTE — TELEPHONE ENCOUNTER
Patient stated that today the swelling isn't as bad but still sore and stiff. Says when he gets up to walk he isn't able to bend it. He walks with a limp. Told him that Dr. Sexton will order an ultrasound. Patient stated that he tried calling a few times but couldn't get through but this is the first time that he was able to get through to a person so that's where the frustration came in. Told him that Dr. Sexton stated in the meantime he can rest, ice, compression, and elevate the affected joint. Ok to continue meloxicam for pain and can alternate with tylenol as directed on package for pain. Ultrasound scheduled for tomorrow at 12:30pm. Patient verbalized understanding

## 2025-04-29 ENCOUNTER — NURSE TRIAGE (OUTPATIENT)
Dept: ADMINISTRATIVE | Facility: CLINIC | Age: 70
End: 2025-04-29
Payer: MEDICARE

## 2025-04-29 ENCOUNTER — HOSPITAL ENCOUNTER (OUTPATIENT)
Dept: RADIOLOGY | Facility: HOSPITAL | Age: 70
Discharge: HOME | End: 2025-04-29
Attending: INTERNAL MEDICINE
Payer: MEDICARE

## 2025-04-29 DIAGNOSIS — I82.432 ACUTE DEEP VEIN THROMBOSIS (DVT) OF POPLITEAL VEIN OF LEFT LOWER EXTREMITY: ICD-10-CM

## 2025-04-29 PROCEDURE — 93971 EXTREMITY STUDY: CPT | Mod: TC,LT

## 2025-04-29 PROCEDURE — 93971 EXTREMITY STUDY: CPT | Mod: 26,LT,, | Performed by: RADIOLOGY

## 2025-04-29 NOTE — TELEPHONE ENCOUNTER
Pt is calling and states that he thinks that he might have sprained  left knee. Soreness and stiffness. Started Murphy morning. 4/27/25. Pt does work out in the gym and does leg presses and he increased the weight on Wednesday in the gym but it was not until Murphy morning that he felt the pain. Pt is using ice and elevating it. 6/10 on a pain scale when he walks. Some swelling noted. Dispo- See in office within 3 days. Able to schedule an apnt for pt with Alpa ORLANDO on Friday 5/2 @ 11:00am. Pt aware and VU. Advised pt to call back for any further concerns or questions.           Reason for Disposition   MODERATE pain (e.g., symptoms interfere with work or school, limping) and present > 3 days    Additional Information   Negative: Sounds like a life-threatening emergency to the triager   Negative: Swollen knee joint and fever   Negative: Patient sounds very sick or weak to the triager   Negative: Can't move swollen joint at all   Negative: Thigh or calf pain and only 1 side and present > 1 hour   Negative: Thigh or calf swelling and only 1 side   Negative: SEVERE pain (e.g., excruciating, unable to walk)   Negative: Very swollen knee joint   Negative: Painful rash with multiple small blisters grouped together (i.e., dermatomal distribution or 'band' or 'stripe')   Negative: Looks like a boil, infected sore, deep ulcer, or other infected rash (spreading redness, pus)    Protocols used: Knee Pain-A-OH

## 2025-05-01 ENCOUNTER — OFFICE VISIT (OUTPATIENT)
Dept: FAMILY MEDICINE | Facility: CLINIC | Age: 70
End: 2025-05-01
Payer: MEDICARE

## 2025-05-01 VITALS
WEIGHT: 222.69 LBS | OXYGEN SATURATION: 97 % | HEIGHT: 69 IN | RESPIRATION RATE: 18 BRPM | TEMPERATURE: 99 F | DIASTOLIC BLOOD PRESSURE: 82 MMHG | HEART RATE: 58 BPM | BODY MASS INDEX: 32.98 KG/M2 | SYSTOLIC BLOOD PRESSURE: 154 MMHG

## 2025-05-01 DIAGNOSIS — M25.562 ACUTE PAIN OF LEFT KNEE: Primary | ICD-10-CM

## 2025-05-01 PROCEDURE — 3079F DIAST BP 80-89 MM HG: CPT | Mod: CPTII,S$GLB,,

## 2025-05-01 PROCEDURE — 4010F ACE/ARB THERAPY RXD/TAKEN: CPT | Mod: CPTII,S$GLB,,

## 2025-05-01 PROCEDURE — 1125F AMNT PAIN NOTED PAIN PRSNT: CPT | Mod: CPTII,S$GLB,,

## 2025-05-01 PROCEDURE — 1159F MED LIST DOCD IN RCRD: CPT | Mod: CPTII,S$GLB,,

## 2025-05-01 PROCEDURE — 99213 OFFICE O/P EST LOW 20 MIN: CPT | Mod: S$GLB,,,

## 2025-05-01 PROCEDURE — 3008F BODY MASS INDEX DOCD: CPT | Mod: CPTII,S$GLB,,

## 2025-05-01 PROCEDURE — 3044F HG A1C LEVEL LT 7.0%: CPT | Mod: CPTII,S$GLB,,

## 2025-05-01 PROCEDURE — 3288F FALL RISK ASSESSMENT DOCD: CPT | Mod: CPTII,S$GLB,,

## 2025-05-01 PROCEDURE — 3077F SYST BP >= 140 MM HG: CPT | Mod: CPTII,S$GLB,,

## 2025-05-01 PROCEDURE — 99999 PR PBB SHADOW E&M-EST. PATIENT-LVL III: CPT | Mod: PBBFAC,,,

## 2025-05-01 PROCEDURE — 1101F PT FALLS ASSESS-DOCD LE1/YR: CPT | Mod: CPTII,S$GLB,,

## 2025-05-01 NOTE — PROGRESS NOTES
Health Maintenance Due   Topic Date Due    Abdominal Aortic Aneurysm Screening  Consult PCP     Colorectal Cancer Screening  Consult PCP

## 2025-05-01 NOTE — PROGRESS NOTES
Family Medicine     Patient name: Ascencion Owens  MRN: [id number]  : 1955  PCP NAME: Brittnee Hoskins MD    Subjective     History of Present Illness:  Patient ID: Ascencion Owens is a 69 y.o. Black or  male presents to the clinic today. Chronic medical issues, if present, have been documented.   Active Problem List with Overview Notes    Diagnosis Date Noted    Palpitations 2025    Protrusion of lumbar intervertebral disc 2025    Prediabetes 2025    Internal hemorrhoids 2025    Decreased strength of trunk and back 2024    Decreased range of motion of trunk and back 2024    JOSEPH (obstructive sleep apnea) 2024    Hypersomnolence 2024    Central sleep apnea due to medical condition 2024    Annual physical exam 2024    Psychophysiological insomnia 2024    Nocturia 2024    Pinguecula of both eyes 2018    Acute idiopathic gout involving toe of left foot 2018    Body mass index 33.0-33.9, adult 2015    History of cluster headache 2014    Obesity 2013    Essential hypertension 2012    Hyperlipidemia, acquired 2012    History of pulmonary embolism 2012       Chief Complaint  Chief Complaint   Patient presents with    Knee Pain     Swelling left knee pain        Presents with 4 day history of left knee pain.  Sudden onset.  Worse by attempts to put pressure on the knee.  Pain radiating down the left calf.  There was associated swelling of the knee.  Triggering factor is unclear, but he notes he has increased his leg pressing in the gym.  No fever.  No similar symptoms in the right knee.   Due to history of thrombus and being on DOAC, he contacted his hematologist and a subsequent lower extremity ultrasound was negative.   Symptoms eased up a little after he took some anti-inflammatories but have not completely resolve.    Medications   Medication List with  Changes/Refills   Current Medications    ATORVASTATIN (LIPITOR) 20 MG TABLET    Take 1 tablet (20 mg total) by mouth once daily.    FLUTICASONE PROPIONATE (FLONASE) 50 MCG/ACTUATION NASAL SPRAY    1 spray (50 mcg total) by Each Nostril route once daily.    LOSARTAN (COZAAR) 50 MG TABLET    Take 1 tablet (50 mg total) by mouth once daily.    MELOXICAM (MOBIC) 15 MG TABLET    Take 1 tablet (15 mg total) by mouth daily as needed for Pain.    MULTIVITAMIN (ONE DAILY MULTIVITAMIN) PER TABLET    Take 1 tablet by mouth once daily.    RIVAROXABAN (XARELTO) 20 MG TAB    Take 1 tablet (20 mg total) by mouth daily with dinner or evening meal.    TAMSULOSIN (FLOMAX) 0.4 MG CAP    Take 1 capsule (0.4 mg total) by mouth once daily.    TIZANIDINE (ZANAFLEX) 4 MG TABLET    TAKE 1 TO 2 TABLETS BY MOUTH NIGHTLY AS NEEDED FOR PAIN    VITAMIN D (VITAMIN D3) 1000 UNITS TAB    Take 5,000 Units by mouth once daily.       Allergies  Review of patient's allergies indicates:   Allergen Reactions    Perfume Rash     Allergic to certain cologne fragrance - rash certain area of the body      Past Medical history  Past Medical History:   Diagnosis Date    Acute bronchitis     Acute embolism and thrombosis of left popliteal vein 07/16/2024    Noted by ASHLEY ANDERSON  last documented on [id number]      Arthritis     CKD (chronic kidney disease)     Cluster headache     Dyslipidemia     Essential hypertension, benign     History of pulmonary embolism 11/09/2012    Hypercholesteremia     Obesity           Surgical History  Past Surgical History:   Procedure Laterality Date    APPENDECTOMY      COLONOSCOPY  01/01/2004    FRACTURE SURGERY  [employer]kle [employer] football    lt ankle surgery  01/01/1973    VASECTOMY  Around 1988     Family History  Family History   Problem Relation Name Age of Onset    Diabetes Mother Natali Owens     Hypertension Mother Natali Owens     Heart disease Mother Natali Owens     Arthritis Mother Natali Owens     Arthritis  "Father Vinicius Owens Sr     Arthritis Sister Natali Guerra      Social History  Social History[1]       Review of system     ROS  Negative except as mentioned above  Physical exam   Vital Signs  Vitals:    05/01/25 1059   BP: (!) 154/82   Pulse: (!) 58   Resp: 18   Temp: 98.5 °F (36.9 °C)   TempSrc: Oral   SpO2: 97%   Weight: 101 kg (222 lb 10.6 oz)   Height: 5' 9" (1.753 m)       Physical Exam  Constitutional:       General: He is not in acute distress.     Appearance: He is not ill-appearing.   HENT:      Head: Normocephalic.   Cardiovascular:      Rate and Rhythm: Normal rate and regular rhythm.      Pulses: Normal pulses.      Heart sounds: Normal heart sounds. No murmur heard.     No gallop.   Pulmonary:      Effort: Pulmonary effort is normal. No respiratory distress.      Breath sounds: Normal breath sounds. No wheezing.   Abdominal:      General: There is no distension.      Palpations: Abdomen is soft.      Tenderness: There is no abdominal tenderness.   Musculoskeletal:      Right lower leg: No edema.      Left lower leg: No edema.   Neurological:      Mental Status: He is alert and oriented to person, place, and time.   Psychiatric:         Mood and Affect: Mood normal.           Wt Readings from Last 3 Encounters:   05/01/25 101 kg (222 lb 10.6 oz)   04/24/25 101.2 kg (223 lb)   03/24/25 101.4 kg (223 lb 8.7 oz)          Body mass index is 32.88 kg/m².      Laboratory data and other diagnostic findings     Lab Results   Component Value Date    WBC 6.34 03/13/2025    HGB 13.9 (L) 03/13/2025    HCT 42.5 03/13/2025    MCV 84 03/13/2025     03/13/2025         Lab Results   Component Value Date    CREATININE 1.0 03/13/2025    BUN 10 03/13/2025     03/13/2025    K 3.7 03/13/2025     03/13/2025    CO2 26 03/13/2025         Assessment and plan       Acute pain of left knee  Nontender knee on examination  Likely ligamentous injury.  Continue extended anti-inflammatories.  He has a prescription " of meloxicam and has been processed by pharmacy.  Avoid put impression the knee.  Regular icing of the joints.         Problem List Items Addressed This Visit    None  Visit Diagnoses         Acute pain of left knee    -  Primary                Medications Administered this visit       Future Appointments  Future Appointments   Date Time Provider Department Center   9/10/2025  1:30 PM Shalom Mendoza NP [facility]   9/23/2025 11:40 AM Brittnee Hoskins MD [facility] [place]   9/26/2025  9:00 AM LAB, [facility] [place]   9/29/2025  8:30 AM Mikaela Coats PA-C [facility] H[facility]         No follow-ups on file. and PRN.      Jorge Colindres MD    This office note was created by combination of typed  and MModal dictation.  Transcription errors may be present.  If there are any questions, please contact me.               [1]   Social History  Tobacco Use    Smoking status: Former     Types: Cigars     Start date: 2004     Quit date: 2010     Years since quitting: 15.3    Smokeless tobacco: Never    Tobacco comments:     occ   Substance Use Topics    Alcohol use: Yes     Alcohol/week: 1.0 standard drink of alcohol     Types: 1 Shots of liquor per week     Comment: occasionally    Drug use: No

## 2025-05-12 DIAGNOSIS — E78.5 HYPERLIPIDEMIA, ACQUIRED: ICD-10-CM

## 2025-05-12 RX ORDER — ATORVASTATIN CALCIUM 20 MG/1
20 TABLET, FILM COATED ORAL DAILY
Qty: 90 TABLET | Refills: 3 | Status: SHIPPED | OUTPATIENT
Start: 2025-05-12 | End: 2026-05-12

## 2025-06-03 ENCOUNTER — PATIENT MESSAGE (OUTPATIENT)
Dept: FAMILY MEDICINE | Facility: CLINIC | Age: 70
End: 2025-06-03
Payer: MEDICARE

## 2025-06-03 ENCOUNTER — PATIENT OUTREACH (OUTPATIENT)
Dept: ADMINISTRATIVE | Facility: HOSPITAL | Age: 70
End: 2025-06-03
Payer: MEDICARE

## 2025-06-03 ENCOUNTER — TELEPHONE (OUTPATIENT)
Dept: ADMINISTRATIVE | Facility: HOSPITAL | Age: 70
End: 2025-06-03
Payer: MEDICARE

## 2025-06-03 VITALS — DIASTOLIC BLOOD PRESSURE: 67 MMHG | SYSTOLIC BLOOD PRESSURE: 133 MMHG

## 2025-06-03 DIAGNOSIS — I10 ESSENTIAL HYPERTENSION: ICD-10-CM

## 2025-06-03 RX ORDER — LOSARTAN POTASSIUM 100 MG/1
100 TABLET ORAL DAILY
Qty: 90 TABLET | Refills: 3 | Status: SHIPPED | OUTPATIENT
Start: 2025-06-03 | End: 2026-06-03

## 2025-06-03 NOTE — PROGRESS NOTES
Spoke to patient regarding arrival time, parking and registration location, ride verification, NPO status, prep instructions, no GLP-1, and will hold Xarelto 06/08. Patient verbalizes understanding.

## 2025-06-09 ENCOUNTER — ANESTHESIA EVENT (OUTPATIENT)
Dept: ENDOSCOPY | Facility: HOSPITAL | Age: 70
End: 2025-06-09
Payer: MEDICARE

## 2025-06-09 RX ORDER — SODIUM CHLORIDE 9 MG/ML
INJECTION, SOLUTION INTRAVENOUS CONTINUOUS
OUTPATIENT
Start: 2025-06-09

## 2025-06-09 RX ORDER — LIDOCAINE HYDROCHLORIDE 10 MG/ML
1 INJECTION, SOLUTION EPIDURAL; INFILTRATION; INTRACAUDAL; PERINEURAL ONCE
OUTPATIENT
Start: 2025-06-09 | End: 2025-06-09

## 2025-06-09 NOTE — H&P
Short Stay Endoscopy History and Physical    PCP - Brittnee Hoskins MD    Procedure - Colonoscopy  ASA - per anesthesia  Mallampati - per anesthesia  History of Anesthesia problems - no  Family history Anesthesia problems - no   Plan of anesthesia - General    HPI:  This is a 69 y.o. male here for evaluation of : asymptomatic screening exam      ROS:  Constitutional: No fevers, chills, No weight loss  CV: No chest pain  Pulm: No cough, No shortness of breath  GI: see HPI  Derm: No rash    Medical History:  has a past medical history of Acute bronchitis, Acute embolism and thrombosis of left popliteal vein (07/16/2024), Arthritis, CKD (chronic kidney disease), Cluster headache, Dyslipidemia, Essential hypertension, benign, History of pulmonary embolism (11/09/2012), Hypercholesteremia, and Obesity.    Surgical History:  has a past surgical history that includes Appendectomy; lt ankle surgery (01/01/1973); Colonoscopy (01/01/2004); Fracture surgery ([employer] football); and Vasectomy (Around 1988).    Family History: family history includes Arthritis in his father, mother, and sister; Diabetes in his mother; Heart disease in his mother; Hypertension in his mother.. Otherwise no colon cancer, inflammatory bowel disease, or GI malignancies.    Social History:  reports that he quit smoking about 15 years ago. His smoking use included cigars. He started smoking about 21 years ago. He has never used smokeless tobacco. He reports current alcohol use of about 1.0 standard drink of alcohol per week. He reports that he does not use drugs.    Review of patient's allergies indicates:   Allergen Reactions    Perfume Rash     Allergic to certain cologne fragrance - rash certain area of the body        Medications:   Prescriptions Prior to Admission[1]      Physical Exam:    Vital Signs: There were no vitals filed for this visit.    Gen: NAD, lying comfortably  HENT: NCAT, oropharynx clear  Eyes: anicteric sclerae, EOMI  grossly  Neck: supple, no visible masses/goiter  Cardiac: RRR  Lungs: non-labored breathing  Abd: soft, NT/ND, normoactive BS  Ext: no LE edema, warm, well perfused  Skin: skin intact on exposed body parts, no visible rashes, lesions  Neuro: A&Ox4, neuro exam grossly intact, moves all extremities  Psych: appropriate mood, affect        Labs:  Lab Results   Component Value Date    WBC 6.34 03/13/2025    HGB 13.9 (L) 03/13/2025    HCT 42.5 03/13/2025     03/13/2025    CHOL 141 03/13/2025    TRIG 46 03/13/2025    HDL 52 03/13/2025    ALT 15 03/13/2025    AST 18 03/13/2025     03/13/2025    K 3.7 03/13/2025     03/13/2025    CREATININE 1.0 03/13/2025    BUN 10 03/13/2025    CO2 26 03/13/2025    TSH 1.340 05/16/2024    PSA 0.57 03/13/2025    INR 1.0 01/31/2023    HGBA1C 5.7 (H) 03/13/2025       Plan:  Colonoscopy for colon cancer screening.    I have explained the risks and benefits of endoscopy procedures to the patient including but not limited to bleeding, perforation, infection, and death.  The patient was asked if they understand and allowed to ask any further questions to their satisfaction.    Emmanuelle Cooley MD         [1]   No medications prior to admission.

## 2025-06-10 ENCOUNTER — ANESTHESIA (OUTPATIENT)
Dept: ENDOSCOPY | Facility: HOSPITAL | Age: 70
End: 2025-06-10
Payer: MEDICARE

## 2025-06-10 ENCOUNTER — HOSPITAL ENCOUNTER (OUTPATIENT)
Facility: HOSPITAL | Age: 70
Discharge: HOME | End: 2025-06-10
Attending: INTERNAL MEDICINE | Admitting: INTERNAL MEDICINE
Payer: MEDICARE

## 2025-06-10 VITALS
TEMPERATURE: 97 F | DIASTOLIC BLOOD PRESSURE: 69 MMHG | RESPIRATION RATE: 18 BRPM | HEART RATE: 47 BPM | SYSTOLIC BLOOD PRESSURE: 161 MMHG | OXYGEN SATURATION: 99 %

## 2025-06-10 DIAGNOSIS — Z12.11 COLON CANCER SCREENING: Primary | ICD-10-CM

## 2025-06-10 PROCEDURE — 37000008 HC ANESTHESIA 1ST 15 MINUTES: Mod: HCNC | Performed by: INTERNAL MEDICINE

## 2025-06-10 PROCEDURE — 45380 COLONOSCOPY AND BIOPSY: CPT | Mod: PT,22,HCNC, | Performed by: INTERNAL MEDICINE

## 2025-06-10 PROCEDURE — 88305 TISSUE EXAM BY PATHOLOGIST: CPT | Mod: TC,HCNC | Performed by: INTERNAL MEDICINE

## 2025-06-10 PROCEDURE — 88305 TISSUE EXAM BY PATHOLOGIST: CPT | Mod: 26,HCNC,, | Performed by: PATHOLOGY

## 2025-06-10 PROCEDURE — 25000003 PHARM REV CODE 250: Mod: HCNC | Performed by: NURSE ANESTHETIST, CERTIFIED REGISTERED

## 2025-06-10 PROCEDURE — 45380 COLONOSCOPY AND BIOPSY: CPT | Mod: PT,HCNC | Performed by: INTERNAL MEDICINE

## 2025-06-10 PROCEDURE — 63600175 PHARM REV CODE 636 W HCPCS: Mod: HCNC | Performed by: NURSE ANESTHETIST, CERTIFIED REGISTERED

## 2025-06-10 PROCEDURE — 27201012 HC FORCEPS, HOT/COLD, DISP: Mod: HCNC | Performed by: INTERNAL MEDICINE

## 2025-06-10 PROCEDURE — 37000009 HC ANESTHESIA EA ADD 15 MINS: Mod: HCNC | Performed by: INTERNAL MEDICINE

## 2025-06-10 RX ORDER — LIDOCAINE HYDROCHLORIDE 20 MG/ML
INJECTION, SOLUTION EPIDURAL; INFILTRATION; INTRACAUDAL; PERINEURAL
Status: DISCONTINUED
Start: 2025-06-10 | End: 2025-06-10 | Stop reason: HOSPADM

## 2025-06-10 RX ORDER — PROPOFOL 10 MG/ML
VIAL (ML) INTRAVENOUS CONTINUOUS PRN
Status: DISCONTINUED | OUTPATIENT
Start: 2025-06-10 | End: 2025-06-10

## 2025-06-10 RX ORDER — PROPOFOL 10 MG/ML
VIAL (ML) INTRAVENOUS
Status: DISCONTINUED | OUTPATIENT
Start: 2025-06-10 | End: 2025-06-10

## 2025-06-10 RX ORDER — LIDOCAINE HYDROCHLORIDE 20 MG/ML
INJECTION INTRAVENOUS
Status: DISCONTINUED | OUTPATIENT
Start: 2025-06-10 | End: 2025-06-10

## 2025-06-10 RX ORDER — PROPOFOL 10 MG/ML
VIAL (ML) INTRAVENOUS
Status: DISCONTINUED
Start: 2025-06-10 | End: 2025-06-10 | Stop reason: HOSPADM

## 2025-06-10 RX ADMIN — LIDOCAINE HYDROCHLORIDE 50 MG: 20 INJECTION, SOLUTION INTRAVENOUS at 11:06

## 2025-06-10 RX ADMIN — SODIUM CHLORIDE: 0.9 INJECTION, SOLUTION INTRAVENOUS at 11:06

## 2025-06-10 RX ADMIN — PROPOFOL 100 MCG/KG/MIN: 10 INJECTION, EMULSION INTRAVENOUS at 11:06

## 2025-06-10 RX ADMIN — PROPOFOL 70 MG: 10 INJECTION, EMULSION INTRAVENOUS at 11:06

## 2025-06-10 NOTE — PROVATION PATIENT INSTRUCTIONS
Discharge Summary/Instructions after an Endoscopic Procedure  Patient Name: Ascencion Owens  Patient MRN: [id number]  Patient YOB: 1955  Tuesday, Agueda 10, 2025  Emmanuelle Cooley MD  Dear patient,  As a result of recent federal legislation (The Federal Cures Act), you may   receive lab or pathology results from your procedure in your [facility]   account before your physician is able to contact you. Your physician or   their representative will relay the results to you with their   recommendations at their soonest availability.  Thank you,  RESTRICTIONS:  During your procedure today, you received medications for sedation.  These   medications may affect your judgment, balance and coordination.  Therefore,   for 24 hours, you have the following restrictions:   - DO NOT drive a car, operate machinery, make legal/financial decisions,   sign important papers or drink alcohol.    ACTIVITY:  Today: no heavy lifting, straining or running due to procedural   sedation/anesthesia.  The following day: return to full activity including work.  DIET:  Eat and drink normally unless instructed otherwise.     TREATMENT FOR COMMON SIDE EFFECTS:  - Mild abdominal pain, nausea, belching, bloating or excessive gas:  rest,   eat lightly and use a heating pad.  - Sore Throat: treat with throat lozenges and/or gargle with warm salt   water.  - Because air was used during the procedure, expelling large amounts of air   from your rectum or belching is normal.  - If a bowel prep was taken, you may not have a bowel movement for 1-3 days.    This is normal.  SYMPTOMS TO WATCH FOR AND REPORT TO YOUR PHYSICIAN:  1. Abdominal pain or bloating, other than gas cramps.  2. Chest pain.  3. Back pain.  4. Signs of infection such as: chills or fever occurring within 24 hours   after the procedure.  5. Rectal bleeding, which would show as bright red, maroon, or black stools.   (A tablespoon of blood from the rectum is not serious, especially if    hemorrhoids are present.)  6. Vomiting.  7. Weakness or dizziness.  GO DIRECTLY TO THE NEAREST EMERGENCY ROOM IF YOU HAVE ANY OF THE FOLLOWING:      Difficulty breathing              Chills and/or fever over 101 F   Persistent vomiting and/or vomiting blood   Severe abdominal pain   Severe chest pain   Black, tarry stools   Bleeding- more than one tablespoon   Any other symptom or condition that you feel may need urgent attention  Your doctor recommends these additional instructions:  If any biopsies were taken, your doctors clinic will contact you in 1 to 2   weeks with any results.  - Discharge patient to home.   - Resume previous diet.   - Continue present medications.   - Await pathology results.   - Repeat colonoscopy in 5 years for surveillance.  For questions, problems or results please call your physician - Emmanuelle Cooley MD at Work:  [phone].  [facility] can be reached at [phone]     IF A COMPLICATION OR EMERGENCY SITUATION ARISES AND YOU ARE UNABLE TO REACH   YOUR PHYSICIAN - GO DIRECTLY TO THE EMERGENCY ROOM.  Emmanuelle Cooley MD  6/10/2025 11:46:02 AM  This report has been verified and signed electronically.  Dear patient,  As a result of recent federal legislation (The Federal Cures Act), you may   receive lab or pathology results from your procedure in your [facility]   account before your physician is able to contact you. Your physician or   their representative will relay the results to you with their   recommendations at their soonest availability.  Thank you,  PROVATION

## 2025-06-10 NOTE — ANESTHESIA PREPROCEDURE EVALUATION
06/10/2025  Ascencion Owens is a 69 y.o., male.  To undergo Procedure(s) (LRB):  COLONOSCOPY, SCREENING, LOW RISK PATIENT (N/A)     Denies CP/SOB/GERD/MI/CVA/URI symptoms.  METS > 4  NPO > 8    Past Medical History:  Past Medical History:   Diagnosis Date    Acute bronchitis     Acute embolism and thrombosis of left popliteal vein 07/16/2024    Noted by ASHLEY OLESNP  last documented on [id number]      Arthritis     CKD (chronic kidney disease)     Cluster headache     Dyslipidemia     Essential hypertension, benign     History of pulmonary embolism 11/09/2012    Hypercholesteremia     Obesity        Past Surgical History:  Past Surgical History:   Procedure Laterality Date    APPENDECTOMY      COLONOSCOPY  01/01/2004    FRACTURE SURGERY  Ankle [employer]    lt ankle surgery  01/01/1973    VASECTOMY  Around 1988       Social History:  Social History[1]    Medications:  Medications Ordered Prior to Encounter[2]    Allergies:  Review of patient's allergies indicates:   Allergen Reactions    Perfume Rash     Allergic to certain cologne fragrance - rash certain area of the body        Active Problems:  Problem List[3]    Diagnostic Studies:   Latest Reference Range & Units 03/13/25 10:06   WBC 3.90 - 12.70 K/uL 6.34   RBC 4.60 - 6.20 M/uL 5.08   Hemoglobin 14.0 - 18.0 g/dL 13.9 (L)   Hematocrit 40.0 - 54.0 % 42.5   MCV 82 - 98 fL 84   MCH 27.0 - 31.0 pg 27.4   MCHC 32.0 - 36.0 g/dL 32.7   RDW 11.5 - 14.5 % 14.2   Platelet Count 150 - 450 K/uL 213      Latest Reference Range & Units 03/13/25 10:06   Sodium 136 - 145 mmol/L 142   Potassium 3.5 - 5.1 mmol/L 3.7   Chloride 95 - 110 mmol/L 105   CO2 23 - 29 mmol/L 26   Anion Gap 8 - 16 mmol/L 11   BUN 8 - 23 mg/dL 10   Creatinine 0.5 - 1.4 mg/dL 1.0   eGFR >60 mL/min/1.73 m^2 >60.0     24 Hour Vitals:      See Nursing Charting For Additional  Vitals      Pre-op Assessment    I have reviewed the Patient Summary Reports.     I have reviewed the Nursing Notes.       Review of Systems  Anesthesia Hx:  No problems with previous Anesthesia               Denies Personal Hx of Anesthesia complications.                    Social:  Former Smoker, Social Alcohol Use       Hematology/Oncology:                   Hematology Comments: Embolism popliteal vein    H/O PE                    Cardiovascular:  Exercise tolerance: good   Hypertension                                          Pulmonary:        Sleep Apnea                Renal/:  Chronic Renal Disease, CKD                Hepatic/GI:  Hepatic/GI Normal                    Neurological:      Headaches                                 Endocrine:        Obesity / BMI > 30      Physical Exam  General: Well nourished and Cooperative    Airway:  Mallampati: II   Mouth Opening: Normal  TM Distance: Normal    Dental:  Intact    Chest/Lungs:  Clear to auscultation, Normal Respiratory Rate    Heart:  Rate: Normal  Rhythm: Regular Rhythm        Anesthesia Plan  Type of Anesthesia, risks & benefits discussed:    Anesthesia Type: Gen ETT, Gen Natural Airway, MAC  Intra-op Monitoring Plan: Standard ASA Monitors  Post Op Pain Control Plan: multimodal analgesia  Induction:  IV  Informed Consent: Informed consent signed with the Patient and all parties understand the risks and agree with anesthesia plan.  All questions answered.   ASA Score: 2    Ready For Surgery From Anesthesia Perspective.     .           [1]   Social History  Socioeconomic History    Marital status:    Tobacco Use    Smoking status: Former     Types: Cigars     Start date: 2004     Quit date: 2010     Years since quitting: 15.4    Smokeless tobacco: Never    Tobacco comments:     occ   Substance and Sexual Activity    Alcohol use: Yes     Alcohol/week: 1.0 standard drink of alcohol     Types: 1 Shots of liquor per week     Comment: occasionally    Drug  use: No    Sexual activity: Yes     Partners: Female     Social Drivers of Health     Financial Resource Strain: Low Risk  (7/15/2024)    Overall Financial Resource Strain (CARDIA)     Difficulty of Paying Living Expenses: Not very hard   Food Insecurity: No Food Insecurity (7/15/2024)    Hunger Vital Sign     Worried About Running Out of Food in the Last Year: Never true     Ran Out of Food in the Last Year: Never true   Transportation Needs: No Transportation Needs (6/8/2022)    PRAPARE - Transportation     Lack of Transportation (Medical): No     Lack of Transportation (Non-Medical): No   Physical Activity: Sufficiently Active (7/15/2024)    Exercise Vital Sign     Days of Exercise per Week: 3 days     Minutes of Exercise per Session: 80 min   Stress: Stress Concern Present (7/15/2024)    [employer] - Occupational Stress Questionnaire     Feeling of Stress : To some extent   Housing Stability: Low Risk  (6/8/2022)    Housing Stability Vital Sign     Unable to Pay for Housing in the Last Year: No     Number of Places Lived in the Last Year: 1     Unstable Housing in the Last Year: No   [2]   No current facility-administered medications on file prior to encounter.     Current Outpatient Medications on File Prior to Encounter   Medication Sig Dispense Refill    fluticasone propionate (FLONASE) 50 mcg/actuation nasal spray 1 spray (50 mcg total) by Each Nostril route once daily. 9.9 mL 3    meloxicam (MOBIC) 15 MG tablet Take 1 tablet (15 mg total) by mouth daily as needed for Pain. 30 tablet 11    multivitamin (ONE DAILY MULTIVITAMIN) per tablet Take 1 tablet by mouth once daily.      rivaroxaban (XARELTO) 20 mg Tab Take 1 tablet (20 mg total) by mouth daily with dinner or evening meal. 30 tablet 11    tamsulosin (FLOMAX) 0.4 mg Cap Take 1 capsule (0.4 mg total) by mouth once daily. 90 capsule 3    tiZANidine (ZANAFLEX) 4 MG tablet TAKE 1 TO 2 TABLETS BY MOUTH NIGHTLY AS NEEDED FOR PAIN  180 tablet 2    vitamin D (VITAMIN D3) 1000 units Tab Take 5,000 Units by mouth once daily.     [3]   Patient Active Problem List  Diagnosis    Essential hypertension    Hyperlipidemia, acquired    History of pulmonary embolism    Obesity    History of cluster headache    Body mass index 33.0-33.9, adult    Acute idiopathic gout involving toe of left foot    Pinguecula of both eyes    Annual physical exam    Psychophysiological insomnia    Nocturia    Central sleep apnea due to medical condition    Hypersomnolence    JOSEPH (obstructive sleep apnea)    Decreased strength of trunk and back    Decreased range of motion of trunk and back    Palpitations    Protrusion of lumbar intervertebral disc    Prediabetes    Internal hemorrhoids

## 2025-06-10 NOTE — ANESTHESIA POSTPROCEDURE EVALUATION
Anesthesia Post Evaluation    Patient: Ascencion Owens    Procedure(s) Performed: Procedure(s) (LRB):  COLONOSCOPY, SCREENING, LOW RISK PATIENT (N/A)    Final Anesthesia Type: general      Patient location during evaluation: GI PACU  Patient participation: Yes- Able to Participate  Level of consciousness: awake and alert and oriented  Post-procedure vital signs: reviewed and stable  Pain management: adequate  Airway patency: patent    PONV status at discharge: No PONV  Anesthetic complications: no      Cardiovascular status: hemodynamically stable and blood pressure returned to baseline  Respiratory status: spontaneous ventilation, room air and unassisted  Hydration status: euvolemic  Follow-up not needed.              Vitals Value Taken Time   /69 06/10/25 12:31   Temp 36.3 °C (97.4 °F) 06/10/25 11:47   Pulse 47 06/10/25 12:31   Resp 18 06/10/25 12:31   SpO2 99 % 06/10/25 12:31         Event Time   Out of Recovery 12:32:00         Pain/Andree Score: Andree Score: 10 (6/10/2025 12:31 PM)

## 2025-06-10 NOTE — OR NURSING
PROCEDURE AND RECOVERY COMPLETED. DR. THOMSON DISCUSSED FINDINGS WITH PATIENT AND SPOUSE; DISCHARGE INSTRUCTIONS GIVEN AND UNDERSTANDING VERBALIZED; ASSISTED UP WITHOUT UNTOWARD EFFECTS; PATIENT READY DISCHARGE

## 2025-06-10 NOTE — TRANSFER OF CARE
Anesthesia Transfer of Care Note    Patient: Ascencion Owens    Procedure(s) Performed: Procedure(s) (LRB):  COLONOSCOPY, SCREENING, LOW RISK PATIENT (N/A)    Patient location: GI    Anesthesia Type: general    Transport from OR: Transported from OR on room air with adequate spontaneous ventilation    Post pain: adequate analgesia    Post assessment: no apparent anesthetic complications and tolerated procedure well    Post vital signs: stable    Level of consciousness: awake, alert and oriented    Nausea/Vomiting: no nausea/vomiting    Complications: none    Transfer of care protocol was followed    Last vitals: Visit Vitals  BP (!) 123/59   Pulse (!) 54   Temp 36.3 °C (97.4 °F)   Resp 16   SpO2 97%

## 2025-06-11 ENCOUNTER — RESULTS FOLLOW-UP (OUTPATIENT)
Dept: GASTROENTEROLOGY | Facility: CLINIC | Age: 70
End: 2025-06-11

## 2025-06-11 LAB
ESTROGEN SERPL-MCNC: NORMAL PG/ML
INSULIN SERPL-ACNC: NORMAL U[IU]/ML
LAB AP CLINICAL INFORMATION: NORMAL
LAB AP GROSS DESCRIPTION: NORMAL
LAB AP PERFORMING LOCATION(S): NORMAL
LAB AP REPORT FOOTNOTES: NORMAL

## 2025-06-22 DIAGNOSIS — I10 ESSENTIAL HYPERTENSION: ICD-10-CM

## 2025-06-22 RX ORDER — LOSARTAN POTASSIUM 100 MG/1
100 TABLET ORAL DAILY
Qty: 90 TABLET | Refills: 3 | Status: CANCELLED | OUTPATIENT
Start: 2025-06-22 | End: 2026-06-22

## 2025-06-23 ENCOUNTER — PATIENT MESSAGE (OUTPATIENT)
Dept: FAMILY MEDICINE | Facility: CLINIC | Age: 70
End: 2025-06-23
Payer: MEDICARE

## 2025-06-23 NOTE — TELEPHONE ENCOUNTER
No care due was identified.  [facility] Embedded Care Due Messages. Reference number: [id number].   6/22/2025 7:52:05 PM CDT

## 2025-06-30 DIAGNOSIS — R35.1 NOCTURIA: ICD-10-CM

## 2025-06-30 RX ORDER — MELOXICAM 15 MG/1
15 TABLET ORAL DAILY
COMMUNITY
End: 2025-06-30 | Stop reason: SDUPTHER

## 2025-06-30 RX ORDER — MELOXICAM 15 MG/1
15 TABLET ORAL DAILY
Qty: 30 TABLET | Refills: 0 | Status: SHIPPED | OUTPATIENT
Start: 2025-06-30 | End: 2025-07-30

## 2025-06-30 RX ORDER — TAMSULOSIN HYDROCHLORIDE 0.4 MG/1
0.4 CAPSULE ORAL DAILY
Qty: 90 CAPSULE | Refills: 3 | Status: SHIPPED | OUTPATIENT
Start: 2025-06-30 | End: 2026-06-30

## 2025-06-30 NOTE — TELEPHONE ENCOUNTER
No care due was identified.  [facility] Embedded Care Due Messages. Reference number: [id number].   6/30/2025 8:59:36 AM CDT

## 2025-07-21 ENCOUNTER — DOCUMENTATION ONLY (OUTPATIENT)
Dept: FAMILY MEDICINE | Facility: CLINIC | Age: 70
End: 2025-07-21
Payer: MEDICARE

## 2025-07-21 ENCOUNTER — LAB VISIT (OUTPATIENT)
Dept: LAB | Facility: HOSPITAL | Age: 70
End: 2025-07-21
Attending: PSYCHIATRY & NEUROLOGY
Payer: MEDICARE

## 2025-07-21 DIAGNOSIS — G47.33 OBSTRUCTIVE SLEEP APNEA (ADULT) (PEDIATRIC): ICD-10-CM

## 2025-07-21 DIAGNOSIS — I10 ESSENTIAL HYPERTENSION, MALIGNANT: ICD-10-CM

## 2025-07-21 DIAGNOSIS — R73.03 DIABETES MELLITUS, LATENT: ICD-10-CM

## 2025-07-21 DIAGNOSIS — R35.0 URINARY FREQUENCY: ICD-10-CM

## 2025-07-21 DIAGNOSIS — Z86.11 PERSONAL HISTORY OF TUBERCULOSIS: ICD-10-CM

## 2025-07-21 DIAGNOSIS — G44.019: Primary | ICD-10-CM

## 2025-07-21 LAB
25(OH)D3+25(OH)D2 SERPL-MCNC: 48 NG/ML (ref 30–96)
CRP SERPL-MCNC: 0.8 MG/L
D DIMER PPP IA.FEU-MCNC: <0.19 MG/L FEU
EAG (OHS): 123 MG/DL (ref 68–131)
ERYTHROCYTE [SEDIMENTATION RATE] IN BLOOD BY PHOTOMETRIC METHOD: 15 MM/HR
HBA1C MFR BLD: 5.9 % (ref 4–5.6)
PROLACTIN SERPL IA-MCNC: 17.1 NG/ML (ref 3.5–19.4)
T4 FREE SERPL-MCNC: 0.9 NG/DL (ref 0.71–1.51)
T4 FREE SERPL-MCNC: 0.9 NG/DL (ref 0.71–1.51)
TSH SERPL-ACNC: 1.17 UIU/ML (ref 0.4–4)

## 2025-07-21 PROCEDURE — 85652 RBC SED RATE AUTOMATED: CPT | Mod: HCNC

## 2025-07-21 PROCEDURE — 84165 PROTEIN E-PHORESIS SERUM: CPT

## 2025-07-21 PROCEDURE — 82306 VITAMIN D 25 HYDROXY: CPT | Mod: GA,HCNC

## 2025-07-21 PROCEDURE — 84443 ASSAY THYROID STIM HORMONE: CPT | Mod: HCNC

## 2025-07-21 PROCEDURE — 84165 PROTEIN E-PHORESIS SERUM: CPT | Mod: ,,, | Performed by: PATHOLOGY

## 2025-07-21 PROCEDURE — 83036 HEMOGLOBIN GLYCOSYLATED A1C: CPT | Mod: HCNC

## 2025-07-21 PROCEDURE — 84146 ASSAY OF PROLACTIN: CPT | Mod: HCNC

## 2025-07-21 PROCEDURE — 86334 IMMUNOFIX E-PHORESIS SERUM: CPT | Mod: ,,, | Performed by: PATHOLOGY

## 2025-07-21 PROCEDURE — 86140 C-REACTIVE PROTEIN: CPT | Mod: HCNC

## 2025-07-21 PROCEDURE — 86334 IMMUNOFIX E-PHORESIS SERUM: CPT

## 2025-07-21 PROCEDURE — 86038 ANTINUCLEAR ANTIBODIES: CPT

## 2025-07-21 PROCEDURE — 85379 FIBRIN DEGRADATION QUANT: CPT | Mod: HCNC

## 2025-07-21 PROCEDURE — 83529 ASAY OF INTERLEUKIN-6 (IL-6): CPT | Mod: HCNC

## 2025-07-21 PROCEDURE — 36415 COLL VENOUS BLD VENIPUNCTURE: CPT | Mod: HCNC

## 2025-07-21 NOTE — PROGRESS NOTES
Received a progress note from Dr Bethea about patient. He was diagnosed with episodic cluster headache. Was started on:   - verapamil   - home oxygen  - emgality   - lithium carbonate ER  - sumatriptan nasal spray   - MRI brain  - medrol pack   - tosymra nasal spray     Patient to follow up with Dr. Bethea around 8/15/25

## 2025-07-22 LAB
ALBUMIN, SPE (OHS): 3.7 G/DL (ref 3.35–5.55)
ALPHA 1 GLOB (OHS): 0.29 GM/DL (ref 0.17–0.41)
ALPHA 2 GLOB (OHS): 0.73 GM/DL (ref 0.43–0.99)
ANA (OHS): NORMAL
BETA GLOB (OHS): 0.86 GM/DL (ref 0.5–1.1)
GAMMA GLOBULIN (OHS): 1.01 GM/DL (ref 0.67–1.58)
IL6 SERPL-MCNC: <2 PG/ML
PATHOLOGIST INTERPRETATION - IFE SERUM (OHS): NORMAL
PATHOLOGIST REVIEW - SPE (OHS): NORMAL
PROT SERPL-MCNC: 6.6 GM/DL (ref 6–8.4)

## 2025-07-31 RX ORDER — MELOXICAM 15 MG/1
TABLET ORAL
Qty: 30 TABLET | Refills: 3 | Status: SHIPPED | OUTPATIENT
Start: 2025-07-31

## 2025-07-31 NOTE — TELEPHONE ENCOUNTER
No care due was identified.  [facility] Embedded Care Due Messages. Reference number: [id number].   7/31/2025 3:55:51 AM CDT

## 2025-08-06 ENCOUNTER — HOSPITAL ENCOUNTER (OUTPATIENT)
Dept: RADIOLOGY | Facility: HOSPITAL | Age: 70
Discharge: HOME | End: 2025-08-06
Attending: PSYCHIATRY & NEUROLOGY
Payer: MEDICARE

## 2025-08-06 DIAGNOSIS — I10 HTN (HYPERTENSION): ICD-10-CM

## 2025-08-06 DIAGNOSIS — G44.019 EPISODIC CLUSTER HEADACHE, NOT INTRACTABLE: ICD-10-CM

## 2025-08-06 PROCEDURE — 70553 MRI BRAIN STEM W/O & W/DYE: CPT | Mod: 26,HCNC,, | Performed by: STUDENT IN AN ORGANIZED HEALTH CARE EDUCATION/TRAINING PROGRAM

## 2025-08-06 PROCEDURE — 25500020 PHARM REV CODE 255: Mod: HCNC | Performed by: PSYCHIATRY & NEUROLOGY

## 2025-08-06 PROCEDURE — 70553 MRI BRAIN STEM W/O & W/DYE: CPT | Mod: TC,HCNC

## 2025-08-06 PROCEDURE — A9585 GADOBUTROL INJECTION: HCPCS | Mod: HCNC | Performed by: PSYCHIATRY & NEUROLOGY

## 2025-08-06 RX ORDER — GADOBUTROL 604.72 MG/ML
5 INJECTION INTRAVENOUS
Status: COMPLETED | OUTPATIENT
Start: 2025-08-06 | End: 2025-08-06

## 2025-08-06 RX ADMIN — GADOBUTROL 5 ML: 604.72 INJECTION INTRAVENOUS at 02:08

## 2025-09-01 ENCOUNTER — PATIENT MESSAGE (OUTPATIENT)
Dept: FAMILY MEDICINE | Facility: CLINIC | Age: 70
End: 2025-09-01
Payer: MEDICARE

## 2025-09-04 ENCOUNTER — TELEPHONE (OUTPATIENT)
Dept: SLEEP MEDICINE | Facility: CLINIC | Age: 70
End: 2025-09-04
Payer: MEDICARE

## 2025-09-04 ENCOUNTER — PATIENT MESSAGE (OUTPATIENT)
Dept: FAMILY MEDICINE | Facility: CLINIC | Age: 70
End: 2025-09-04
Payer: MEDICARE

## 2025-09-04 DIAGNOSIS — Z23 NEED FOR COVID-19 VACCINE: Primary | ICD-10-CM

## 2025-09-05 RX ORDER — COVID-19 VACCINE, MRNA 0.04 MG/.418ML
30 INJECTION, SUSPENSION INTRAMUSCULAR ONCE
Qty: 0.3 ML | Refills: 0 | Status: SHIPPED | OUTPATIENT
Start: 2025-09-05 | End: 2025-09-05